# Patient Record
Sex: MALE | Race: WHITE | NOT HISPANIC OR LATINO | Employment: FULL TIME | URBAN - METROPOLITAN AREA
[De-identification: names, ages, dates, MRNs, and addresses within clinical notes are randomized per-mention and may not be internally consistent; named-entity substitution may affect disease eponyms.]

---

## 2018-07-18 ENCOUNTER — RECORDS - HEALTHEAST (OUTPATIENT)
Dept: LAB | Facility: HOSPITAL | Age: 48
End: 2018-07-18

## 2018-07-19 LAB — HBV SURFACE AB SERPL IA-ACNC: NEGATIVE M[IU]/ML

## 2018-07-20 LAB
GAMMA INTERFERON BACKGROUND BLD IA-ACNC: 0.14 IU/ML
M TB IFN-G BLD-IMP: NEGATIVE
MITOGEN IGNF BCKGRD COR BLD-ACNC: -0.02 IU/ML
MITOGEN IGNF BCKGRD COR BLD-ACNC: 0.04 IU/ML
QTF INTERPRETATION: NORMAL
QTF MITOGEN - NIL: >10 IU/ML

## 2018-09-21 ENCOUNTER — COMMUNICATION - HEALTHEAST (OUTPATIENT)
Dept: TELEHEALTH | Facility: CLINIC | Age: 48
End: 2018-09-21

## 2018-11-02 ENCOUNTER — OFFICE VISIT - HEALTHEAST (OUTPATIENT)
Dept: FAMILY MEDICINE | Facility: CLINIC | Age: 48
End: 2018-11-02

## 2018-11-02 DIAGNOSIS — Z00.00 ROUTINE HISTORY AND PHYSICAL EXAMINATION OF ADULT: ICD-10-CM

## 2018-11-02 DIAGNOSIS — Z12.11 SCREEN FOR COLON CANCER: ICD-10-CM

## 2018-11-02 DIAGNOSIS — R73.03 PREDIABETES: ICD-10-CM

## 2018-11-02 DIAGNOSIS — Z12.5 SCREENING FOR PROSTATE CANCER: ICD-10-CM

## 2018-11-02 DIAGNOSIS — E29.1 MALE HYPOGONADISM: ICD-10-CM

## 2018-11-02 DIAGNOSIS — E29.1 HYPOGONADISM MALE: ICD-10-CM

## 2018-11-02 DIAGNOSIS — F32.A DEPRESSION: ICD-10-CM

## 2018-11-02 LAB
ALBUMIN SERPL-MCNC: 4.1 G/DL (ref 3.5–5)
ALP SERPL-CCNC: 74 U/L (ref 45–120)
ALT SERPL W P-5'-P-CCNC: 33 U/L (ref 0–45)
ANION GAP SERPL CALCULATED.3IONS-SCNC: 12 MMOL/L (ref 5–18)
AST SERPL W P-5'-P-CCNC: 23 U/L (ref 0–40)
BASOPHILS # BLD AUTO: 0.1 THOU/UL (ref 0–0.2)
BASOPHILS NFR BLD AUTO: 1 % (ref 0–2)
BILIRUB SERPL-MCNC: 1.6 MG/DL (ref 0–1)
BUN SERPL-MCNC: 13 MG/DL (ref 8–22)
CALCIUM SERPL-MCNC: 9.4 MG/DL (ref 8.5–10.5)
CHLORIDE BLD-SCNC: 104 MMOL/L (ref 98–107)
CHOLEST SERPL-MCNC: 145 MG/DL
CO2 SERPL-SCNC: 24 MMOL/L (ref 22–31)
CREAT SERPL-MCNC: 1.11 MG/DL (ref 0.7–1.3)
EOSINOPHIL # BLD AUTO: 0.2 THOU/UL (ref 0–0.4)
EOSINOPHIL NFR BLD AUTO: 3 % (ref 0–6)
ERYTHROCYTE [DISTWIDTH] IN BLOOD BY AUTOMATED COUNT: 10.8 % (ref 11–14.5)
FASTING STATUS PATIENT QL REPORTED: YES
FSH SERPL-ACNC: <3 MIU/ML (ref 0–12)
GFR SERPL CREATININE-BSD FRML MDRD: >60 ML/MIN/1.73M2
GLUCOSE BLD-MCNC: 133 MG/DL (ref 70–125)
HCT VFR BLD AUTO: 53.4 % (ref 40–54)
HDLC SERPL-MCNC: 40 MG/DL
HGB BLD-MCNC: 18.9 G/DL (ref 14–18)
LDLC SERPL CALC-MCNC: 93 MG/DL
LH SERPL-ACNC: <0.5 MIU/ML
LYMPHOCYTES # BLD AUTO: 1.8 THOU/UL (ref 0.8–4.4)
LYMPHOCYTES NFR BLD AUTO: 27 % (ref 20–40)
MCH RBC QN AUTO: 33.2 PG (ref 27–34)
MCHC RBC AUTO-ENTMCNC: 35.3 G/DL (ref 32–36)
MCV RBC AUTO: 94 FL (ref 80–100)
MONOCYTES # BLD AUTO: 0.6 THOU/UL (ref 0–0.9)
MONOCYTES NFR BLD AUTO: 9 % (ref 2–10)
NEUTROPHILS # BLD AUTO: 4 THOU/UL (ref 2–7.7)
NEUTROPHILS NFR BLD AUTO: 60 % (ref 50–70)
PLATELET # BLD AUTO: 228 THOU/UL (ref 140–440)
PMV BLD AUTO: 7.6 FL (ref 7–10)
POTASSIUM BLD-SCNC: 4.1 MMOL/L (ref 3.5–5)
PROT SERPL-MCNC: 6.9 G/DL (ref 6–8)
PSA SERPL-MCNC: 0.3 NG/ML (ref 0–3.5)
RBC # BLD AUTO: 5.67 MILL/UL (ref 4.4–6.2)
SODIUM SERPL-SCNC: 140 MMOL/L (ref 136–145)
TRIGL SERPL-MCNC: 59 MG/DL
TSH SERPL DL<=0.005 MIU/L-ACNC: 0.73 UIU/ML (ref 0.3–5)
WBC: 6.6 THOU/UL (ref 4–11)

## 2018-11-02 ASSESSMENT — MIFFLIN-ST. JEOR: SCORE: 2278.94

## 2018-11-07 LAB
SHBG SERPL-SCNC: 31 NMOL/L (ref 11–80)
TESTOST FREE SERPL-MCNC: 28.95 NG/DL (ref 4.7–24.4)
TESTOST SERPL-MCNC: 1127 NG/DL (ref 240–950)

## 2018-11-09 ENCOUNTER — COMMUNICATION - HEALTHEAST (OUTPATIENT)
Dept: INTERNAL MEDICINE | Facility: CLINIC | Age: 48
End: 2018-11-09

## 2018-11-28 ENCOUNTER — AMBULATORY - HEALTHEAST (OUTPATIENT)
Dept: FAMILY MEDICINE | Facility: CLINIC | Age: 48
End: 2018-11-28

## 2018-11-28 DIAGNOSIS — E29.1 HYPOGONADISM MALE: ICD-10-CM

## 2019-04-10 ENCOUNTER — COMMUNICATION - HEALTHEAST (OUTPATIENT)
Dept: SCHEDULING | Facility: CLINIC | Age: 49
End: 2019-04-10

## 2019-04-11 ENCOUNTER — OFFICE VISIT - HEALTHEAST (OUTPATIENT)
Dept: FAMILY MEDICINE | Facility: CLINIC | Age: 49
End: 2019-04-11

## 2019-04-11 DIAGNOSIS — R10.10 UPPER ABDOMINAL PAIN, UNSPECIFIED: ICD-10-CM

## 2019-04-11 DIAGNOSIS — R10.10 UPPER ABDOMINAL PAIN: ICD-10-CM

## 2019-04-11 LAB
ALBUMIN SERPL-MCNC: 3.9 G/DL (ref 3.5–5)
ALP SERPL-CCNC: 89 U/L (ref 45–120)
ALT SERPL W P-5'-P-CCNC: 23 U/L (ref 0–45)
ANION GAP SERPL CALCULATED.3IONS-SCNC: 9 MMOL/L (ref 5–18)
AST SERPL W P-5'-P-CCNC: 17 U/L (ref 0–40)
ATRIAL RATE - MUSE: 72 BPM
BASOPHILS # BLD AUTO: 0.1 THOU/UL (ref 0–0.2)
BASOPHILS NFR BLD AUTO: 1 % (ref 0–2)
BILIRUB SERPL-MCNC: 1.5 MG/DL (ref 0–1)
BUN SERPL-MCNC: 20 MG/DL (ref 8–22)
CALCIUM SERPL-MCNC: 9.6 MG/DL (ref 8.5–10.5)
CHLORIDE BLD-SCNC: 102 MMOL/L (ref 98–107)
CK-MB: 2 NG/ML (ref 0–7)
CO2 SERPL-SCNC: 24 MMOL/L (ref 22–31)
CREAT SERPL-MCNC: 1.08 MG/DL (ref 0.7–1.3)
DIASTOLIC BLOOD PRESSURE - MUSE: NORMAL MMHG
EOSINOPHIL # BLD AUTO: 0.2 THOU/UL (ref 0–0.4)
EOSINOPHIL NFR BLD AUTO: 3 % (ref 0–6)
ERYTHROCYTE [DISTWIDTH] IN BLOOD BY AUTOMATED COUNT: 11.1 % (ref 11–14.5)
GFR SERPL CREATININE-BSD FRML MDRD: >60 ML/MIN/1.73M2
GLUCOSE BLD-MCNC: 309 MG/DL (ref 70–125)
HCT VFR BLD AUTO: 53.6 % (ref 40–54)
HGB BLD-MCNC: 18.3 G/DL (ref 14–18)
INTERPRETATION ECG - MUSE: NORMAL
LIPASE SERPL-CCNC: 13 U/L (ref 0–52)
LYMPHOCYTES # BLD AUTO: 2.5 THOU/UL (ref 0.8–4.4)
LYMPHOCYTES NFR BLD AUTO: 38 % (ref 20–40)
MCH RBC QN AUTO: 32.1 PG (ref 27–34)
MCHC RBC AUTO-ENTMCNC: 34.2 G/DL (ref 32–36)
MCV RBC AUTO: 94 FL (ref 80–100)
MONOCYTES # BLD AUTO: 0.4 THOU/UL (ref 0–0.9)
MONOCYTES NFR BLD AUTO: 7 % (ref 2–10)
NEUTROPHILS # BLD AUTO: 3.5 THOU/UL (ref 2–7.7)
NEUTROPHILS NFR BLD AUTO: 52 % (ref 50–70)
P AXIS - MUSE: 60 DEGREES
PLATELET # BLD AUTO: 224 THOU/UL (ref 140–440)
PMV BLD AUTO: 7.6 FL (ref 7–10)
POTASSIUM BLD-SCNC: 4 MMOL/L (ref 3.5–5)
PR INTERVAL - MUSE: 178 MS
PROT SERPL-MCNC: 7 G/DL (ref 6–8)
QRS DURATION - MUSE: 78 MS
QT - MUSE: 378 MS
QTC - MUSE: 413 MS
R AXIS - MUSE: 23 DEGREES
RBC # BLD AUTO: 5.71 MILL/UL (ref 4.4–6.2)
SODIUM SERPL-SCNC: 135 MMOL/L (ref 136–145)
SYSTOLIC BLOOD PRESSURE - MUSE: NORMAL MMHG
T AXIS - MUSE: 39 DEGREES
TROPONIN I SERPL-MCNC: <0.01 NG/ML (ref 0–0.29)
VENTRICULAR RATE- MUSE: 72 BPM
WBC: 6.7 THOU/UL (ref 4–11)

## 2019-04-12 ENCOUNTER — COMMUNICATION - HEALTHEAST (OUTPATIENT)
Dept: INTERNAL MEDICINE | Facility: CLINIC | Age: 49
End: 2019-04-12

## 2019-04-17 ENCOUNTER — OFFICE VISIT - HEALTHEAST (OUTPATIENT)
Dept: SURGERY | Facility: CLINIC | Age: 49
End: 2019-04-17

## 2019-04-17 ENCOUNTER — OFFICE VISIT - HEALTHEAST (OUTPATIENT)
Dept: FAMILY MEDICINE | Facility: CLINIC | Age: 49
End: 2019-04-17

## 2019-04-17 DIAGNOSIS — R73.9 BLOOD GLUCOSE ELEVATED: ICD-10-CM

## 2019-04-17 DIAGNOSIS — E29.1 HYPOGONADISM MALE: ICD-10-CM

## 2019-04-17 DIAGNOSIS — R73.03 PREDIABETES: ICD-10-CM

## 2019-04-17 DIAGNOSIS — R10.11 RUQ ABDOMINAL PAIN: ICD-10-CM

## 2019-04-17 LAB
CHOLEST SERPL-MCNC: 182 MG/DL
CREAT UR-MCNC: 87.8 MG/DL
FASTING STATUS PATIENT QL REPORTED: YES
HBA1C MFR BLD: 7 % (ref 3.5–6)
HDLC SERPL-MCNC: 69 MG/DL
LDLC SERPL CALC-MCNC: 95 MG/DL
MICROALBUMIN UR-MCNC: 1.01 MG/DL (ref 0–1.99)
MICROALBUMIN/CREAT UR: 11.5 MG/G
TRIGL SERPL-MCNC: 91 MG/DL

## 2019-04-19 ENCOUNTER — RECORDS - HEALTHEAST (OUTPATIENT)
Dept: NUCLEAR MEDICINE | Facility: HOSPITAL | Age: 49
End: 2019-04-19

## 2019-04-19 ENCOUNTER — COMMUNICATION - HEALTHEAST (OUTPATIENT)
Dept: SCHEDULING | Facility: CLINIC | Age: 49
End: 2019-04-19

## 2019-04-19 DIAGNOSIS — R10.11 RUQ ABDOMINAL PAIN: ICD-10-CM

## 2019-04-19 DIAGNOSIS — R10.11 RIGHT UPPER QUADRANT PAIN: ICD-10-CM

## 2019-04-19 LAB — TESTOST SERPL-MCNC: 275 NG/DL (ref 221–716)

## 2019-04-20 ENCOUNTER — COMMUNICATION - HEALTHEAST (OUTPATIENT)
Dept: FAMILY MEDICINE | Facility: CLINIC | Age: 49
End: 2019-04-20

## 2019-04-20 DIAGNOSIS — F32.A DEPRESSION: ICD-10-CM

## 2019-04-23 ENCOUNTER — COMMUNICATION - HEALTHEAST (OUTPATIENT)
Dept: FAMILY MEDICINE | Facility: CLINIC | Age: 49
End: 2019-04-23

## 2019-04-25 ENCOUNTER — COMMUNICATION - HEALTHEAST (OUTPATIENT)
Dept: SURGERY | Facility: CLINIC | Age: 49
End: 2019-04-25

## 2019-04-25 ENCOUNTER — OFFICE VISIT - HEALTHEAST (OUTPATIENT)
Dept: FAMILY MEDICINE | Facility: CLINIC | Age: 49
End: 2019-04-25

## 2019-04-25 DIAGNOSIS — R73.03 PREDIABETES: ICD-10-CM

## 2019-04-25 DIAGNOSIS — R10.10 UPPER ABDOMINAL PAIN: ICD-10-CM

## 2019-04-25 DIAGNOSIS — R73.9 BLOOD GLUCOSE ELEVATED: ICD-10-CM

## 2019-04-25 DIAGNOSIS — F32.A DEPRESSION: ICD-10-CM

## 2019-05-15 ENCOUNTER — COMMUNICATION - HEALTHEAST (OUTPATIENT)
Dept: PEDIATRICS | Facility: CLINIC | Age: 49
End: 2019-05-15

## 2019-05-29 ENCOUNTER — OFFICE VISIT - HEALTHEAST (OUTPATIENT)
Dept: FAMILY MEDICINE | Facility: CLINIC | Age: 49
End: 2019-05-29

## 2019-05-29 DIAGNOSIS — F32.A DEPRESSION: ICD-10-CM

## 2019-05-29 DIAGNOSIS — E11.9 TYPE 2 DIABETES MELLITUS WITHOUT COMPLICATION, WITHOUT LONG-TERM CURRENT USE OF INSULIN (H): ICD-10-CM

## 2019-05-31 ENCOUNTER — COMMUNICATION - HEALTHEAST (OUTPATIENT)
Dept: FAMILY MEDICINE | Facility: CLINIC | Age: 49
End: 2019-05-31

## 2019-08-19 ENCOUNTER — COMMUNICATION - HEALTHEAST (OUTPATIENT)
Dept: PHARMACY | Facility: HOSPITAL | Age: 49
End: 2019-08-19

## 2019-08-19 DIAGNOSIS — F32.A DEPRESSION: ICD-10-CM

## 2019-10-21 ENCOUNTER — COMMUNICATION - HEALTHEAST (OUTPATIENT)
Dept: FAMILY MEDICINE | Facility: CLINIC | Age: 49
End: 2019-10-21

## 2019-10-21 DIAGNOSIS — E29.1 HYPOGONADISM MALE: ICD-10-CM

## 2019-10-21 DIAGNOSIS — F32.A DEPRESSION: ICD-10-CM

## 2019-10-22 ENCOUNTER — COMMUNICATION - HEALTHEAST (OUTPATIENT)
Dept: FAMILY MEDICINE | Facility: CLINIC | Age: 49
End: 2019-10-22

## 2019-10-29 ENCOUNTER — COMMUNICATION - HEALTHEAST (OUTPATIENT)
Dept: PHARMACY | Facility: HOSPITAL | Age: 49
End: 2019-10-29

## 2019-11-11 ENCOUNTER — COMMUNICATION - HEALTHEAST (OUTPATIENT)
Dept: FAMILY MEDICINE | Facility: CLINIC | Age: 49
End: 2019-11-11

## 2019-11-14 ENCOUNTER — OFFICE VISIT - HEALTHEAST (OUTPATIENT)
Dept: FAMILY MEDICINE | Facility: CLINIC | Age: 49
End: 2019-11-14

## 2019-11-14 DIAGNOSIS — Z12.11 SCREEN FOR COLON CANCER: ICD-10-CM

## 2019-11-14 DIAGNOSIS — E11.9 TYPE 2 DIABETES MELLITUS WITHOUT COMPLICATION, WITHOUT LONG-TERM CURRENT USE OF INSULIN (H): ICD-10-CM

## 2019-11-14 DIAGNOSIS — Z13.6 ENCOUNTER FOR SCREENING FOR CARDIOVASCULAR DISORDERS: ICD-10-CM

## 2019-11-14 DIAGNOSIS — Z00.00 ROUTINE GENERAL MEDICAL EXAMINATION AT A HEALTH CARE FACILITY: ICD-10-CM

## 2019-11-14 DIAGNOSIS — E29.1 HYPOGONADISM MALE: ICD-10-CM

## 2019-11-14 DIAGNOSIS — F32.A DEPRESSION: ICD-10-CM

## 2019-11-14 DIAGNOSIS — R07.9 LEFT-SIDED CHEST PAIN: ICD-10-CM

## 2019-11-14 LAB
ALBUMIN SERPL-MCNC: 4.3 G/DL (ref 3.5–5)
ALP SERPL-CCNC: 119 U/L (ref 45–120)
ALT SERPL W P-5'-P-CCNC: 32 U/L (ref 0–45)
ANION GAP SERPL CALCULATED.3IONS-SCNC: 12 MMOL/L (ref 5–18)
AST SERPL W P-5'-P-CCNC: 21 U/L (ref 0–40)
BASOPHILS # BLD AUTO: 0.1 THOU/UL (ref 0–0.2)
BASOPHILS NFR BLD AUTO: 1 % (ref 0–2)
BILIRUB SERPL-MCNC: 0.9 MG/DL (ref 0–1)
BUN SERPL-MCNC: 16 MG/DL (ref 8–22)
CALCIUM SERPL-MCNC: 9.6 MG/DL (ref 8.5–10.5)
CHLORIDE BLD-SCNC: 100 MMOL/L (ref 98–107)
CHOLEST SERPL-MCNC: 256 MG/DL
CO2 SERPL-SCNC: 24 MMOL/L (ref 22–31)
CREAT SERPL-MCNC: 1.09 MG/DL (ref 0.7–1.3)
CREAT UR-MCNC: 200.7 MG/DL
EOSINOPHIL # BLD AUTO: 0.3 THOU/UL (ref 0–0.4)
EOSINOPHIL NFR BLD AUTO: 3 % (ref 0–6)
ERYTHROCYTE [DISTWIDTH] IN BLOOD BY AUTOMATED COUNT: 10.8 % (ref 11–14.5)
FASTING STATUS PATIENT QL REPORTED: YES
GFR SERPL CREATININE-BSD FRML MDRD: >60 ML/MIN/1.73M2
GLUCOSE BLD-MCNC: 251 MG/DL (ref 70–125)
HBA1C MFR BLD: 8.6 % (ref 3.5–6)
HCT VFR BLD AUTO: 48.8 % (ref 40–54)
HDLC SERPL-MCNC: 54 MG/DL
HGB BLD-MCNC: 16.5 G/DL (ref 14–18)
LDLC SERPL CALC-MCNC: 168 MG/DL
LYMPHOCYTES # BLD AUTO: 3.1 THOU/UL (ref 0.8–4.4)
LYMPHOCYTES NFR BLD AUTO: 34 % (ref 20–40)
MCH RBC QN AUTO: 31.4 PG (ref 27–34)
MCHC RBC AUTO-ENTMCNC: 33.8 G/DL (ref 32–36)
MCV RBC AUTO: 93 FL (ref 80–100)
MICROALBUMIN UR-MCNC: 2.38 MG/DL (ref 0–1.99)
MICROALBUMIN/CREAT UR: 11.9 MG/G
MONOCYTES # BLD AUTO: 0.9 THOU/UL (ref 0–0.9)
MONOCYTES NFR BLD AUTO: 10 % (ref 2–10)
NEUTROPHILS # BLD AUTO: 4.7 THOU/UL (ref 2–7.7)
NEUTROPHILS NFR BLD AUTO: 52 % (ref 50–70)
PLATELET # BLD AUTO: 248 THOU/UL (ref 140–440)
PMV BLD AUTO: 7.4 FL (ref 7–10)
POTASSIUM BLD-SCNC: 4.1 MMOL/L (ref 3.5–5)
PROT SERPL-MCNC: 7.6 G/DL (ref 6–8)
PSA SERPL-MCNC: 0.3 NG/ML (ref 0–3.5)
RBC # BLD AUTO: 5.24 MILL/UL (ref 4.4–6.2)
SODIUM SERPL-SCNC: 136 MMOL/L (ref 136–145)
TRIGL SERPL-MCNC: 170 MG/DL
WBC: 9 THOU/UL (ref 4–11)

## 2019-11-14 ASSESSMENT — PATIENT HEALTH QUESTIONNAIRE - PHQ9: SUM OF ALL RESPONSES TO PHQ QUESTIONS 1-9: 9

## 2019-11-14 ASSESSMENT — MIFFLIN-ST. JEOR: SCORE: 2376.46

## 2019-11-15 LAB — TESTOST SERPL-MCNC: 506 NG/DL (ref 221–716)

## 2019-11-18 ENCOUNTER — COMMUNICATION - HEALTHEAST (OUTPATIENT)
Dept: PHARMACY | Facility: HOSPITAL | Age: 49
End: 2019-11-18

## 2019-11-18 DIAGNOSIS — E11.9 TYPE 2 DIABETES MELLITUS WITHOUT COMPLICATION, WITHOUT LONG-TERM CURRENT USE OF INSULIN (H): ICD-10-CM

## 2019-11-20 ENCOUNTER — COMMUNICATION - HEALTHEAST (OUTPATIENT)
Dept: INTERNAL MEDICINE | Facility: CLINIC | Age: 49
End: 2019-11-20

## 2019-12-19 ENCOUNTER — COMMUNICATION - HEALTHEAST (OUTPATIENT)
Dept: PHARMACY | Facility: HOSPITAL | Age: 49
End: 2019-12-19

## 2019-12-19 DIAGNOSIS — F32.A DEPRESSION: ICD-10-CM

## 2020-02-20 ENCOUNTER — OFFICE VISIT - HEALTHEAST (OUTPATIENT)
Dept: FAMILY MEDICINE | Facility: CLINIC | Age: 50
End: 2020-02-20

## 2020-02-20 DIAGNOSIS — E11.9 TYPE 2 DIABETES MELLITUS WITHOUT COMPLICATION, WITHOUT LONG-TERM CURRENT USE OF INSULIN (H): ICD-10-CM

## 2020-02-28 ENCOUNTER — RECORDS - HEALTHEAST (OUTPATIENT)
Dept: ADMINISTRATIVE | Facility: OTHER | Age: 50
End: 2020-02-28

## 2020-02-28 LAB — RETINOPATHY: NEGATIVE

## 2020-03-06 ENCOUNTER — RECORDS - HEALTHEAST (OUTPATIENT)
Dept: HEALTH INFORMATION MANAGEMENT | Facility: CLINIC | Age: 50
End: 2020-03-06

## 2020-03-18 ENCOUNTER — COMMUNICATION - HEALTHEAST (OUTPATIENT)
Dept: FAMILY MEDICINE | Facility: CLINIC | Age: 50
End: 2020-03-18

## 2020-05-21 ENCOUNTER — COMMUNICATION - HEALTHEAST (OUTPATIENT)
Dept: FAMILY MEDICINE | Facility: CLINIC | Age: 50
End: 2020-05-21

## 2020-05-21 DIAGNOSIS — Z91.09 ENVIRONMENTAL ALLERGIES: ICD-10-CM

## 2020-05-27 ENCOUNTER — COMMUNICATION - HEALTHEAST (OUTPATIENT)
Dept: PHARMACY | Facility: HOSPITAL | Age: 50
End: 2020-05-27

## 2020-05-27 DIAGNOSIS — F32.A DEPRESSION: ICD-10-CM

## 2020-05-27 DIAGNOSIS — E29.1 HYPOGONADISM MALE: ICD-10-CM

## 2020-06-01 ENCOUNTER — COMMUNICATION - HEALTHEAST (OUTPATIENT)
Dept: FAMILY MEDICINE | Facility: CLINIC | Age: 50
End: 2020-06-01

## 2020-06-01 ENCOUNTER — AMBULATORY - HEALTHEAST (OUTPATIENT)
Dept: LAB | Facility: CLINIC | Age: 50
End: 2020-06-01

## 2020-06-01 DIAGNOSIS — E29.1 HYPOGONADISM MALE: ICD-10-CM

## 2020-06-01 DIAGNOSIS — E11.9 TYPE 2 DIABETES MELLITUS WITHOUT COMPLICATION, WITHOUT LONG-TERM CURRENT USE OF INSULIN (H): ICD-10-CM

## 2020-06-01 LAB
BASOPHILS # BLD AUTO: 0.1 THOU/UL (ref 0–0.2)
BASOPHILS NFR BLD AUTO: 1 % (ref 0–2)
EOSINOPHIL # BLD AUTO: 0.2 THOU/UL (ref 0–0.4)
EOSINOPHIL NFR BLD AUTO: 2 % (ref 0–6)
ERYTHROCYTE [DISTWIDTH] IN BLOOD BY AUTOMATED COUNT: 10.8 % (ref 11–14.5)
HBA1C MFR BLD: 9.5 % (ref 3.5–6)
HCT VFR BLD AUTO: 48.6 % (ref 40–54)
HGB BLD-MCNC: 16.7 G/DL (ref 14–18)
LYMPHOCYTES # BLD AUTO: 3 THOU/UL (ref 0.8–4.4)
LYMPHOCYTES NFR BLD AUTO: 31 % (ref 20–40)
MCH RBC QN AUTO: 32.2 PG (ref 27–34)
MCHC RBC AUTO-ENTMCNC: 34.4 G/DL (ref 32–36)
MCV RBC AUTO: 94 FL (ref 80–100)
MONOCYTES # BLD AUTO: 0.8 THOU/UL (ref 0–0.9)
MONOCYTES NFR BLD AUTO: 9 % (ref 2–10)
NEUTROPHILS # BLD AUTO: 5.7 THOU/UL (ref 2–7.7)
NEUTROPHILS NFR BLD AUTO: 58 % (ref 50–70)
PLATELET # BLD AUTO: 279 THOU/UL (ref 140–440)
PMV BLD AUTO: 8.1 FL (ref 7–10)
PSA SERPL-MCNC: 0.2 NG/ML (ref 0–3.5)
RBC # BLD AUTO: 5.19 MILL/UL (ref 4.4–6.2)
TESTOST SERPL-MCNC: 140 NG/DL (ref 221–716)
WBC: 9.8 THOU/UL (ref 4–11)

## 2020-06-05 ENCOUNTER — OFFICE VISIT - HEALTHEAST (OUTPATIENT)
Dept: FAMILY MEDICINE | Facility: CLINIC | Age: 50
End: 2020-06-05

## 2020-06-05 DIAGNOSIS — Z91.199 NO-SHOW FOR APPOINTMENT: ICD-10-CM

## 2020-06-05 DIAGNOSIS — Z12.11 SCREEN FOR COLON CANCER: ICD-10-CM

## 2020-06-05 DIAGNOSIS — E11.9 TYPE 2 DIABETES MELLITUS WITHOUT COMPLICATION, WITHOUT LONG-TERM CURRENT USE OF INSULIN (H): ICD-10-CM

## 2020-06-05 DIAGNOSIS — E78.5 HYPERLIPIDEMIA, UNSPECIFIED HYPERLIPIDEMIA TYPE: ICD-10-CM

## 2020-06-05 ASSESSMENT — PATIENT HEALTH QUESTIONNAIRE - PHQ9: SUM OF ALL RESPONSES TO PHQ QUESTIONS 1-9: 3

## 2020-06-12 ENCOUNTER — OFFICE VISIT - HEALTHEAST (OUTPATIENT)
Dept: FAMILY MEDICINE | Facility: CLINIC | Age: 50
End: 2020-06-12

## 2020-06-12 ENCOUNTER — COMMUNICATION - HEALTHEAST (OUTPATIENT)
Dept: FAMILY MEDICINE | Facility: CLINIC | Age: 50
End: 2020-06-12

## 2020-06-12 DIAGNOSIS — E11.9 TYPE 2 DIABETES MELLITUS WITHOUT COMPLICATION, WITHOUT LONG-TERM CURRENT USE OF INSULIN (H): ICD-10-CM

## 2020-06-12 DIAGNOSIS — E29.1 HYPOGONADISM MALE: ICD-10-CM

## 2020-06-12 ASSESSMENT — MIFFLIN-ST. JEOR: SCORE: 2344.71

## 2020-07-27 ENCOUNTER — COMMUNICATION - HEALTHEAST (OUTPATIENT)
Dept: FAMILY MEDICINE | Facility: CLINIC | Age: 50
End: 2020-07-27

## 2020-07-27 DIAGNOSIS — E29.1 HYPOGONADISM MALE: ICD-10-CM

## 2020-07-27 DIAGNOSIS — E11.9 TYPE 2 DIABETES MELLITUS WITHOUT COMPLICATION, WITHOUT LONG-TERM CURRENT USE OF INSULIN (H): ICD-10-CM

## 2020-07-31 ENCOUNTER — AMBULATORY - HEALTHEAST (OUTPATIENT)
Dept: LAB | Facility: CLINIC | Age: 50
End: 2020-07-31

## 2020-07-31 DIAGNOSIS — E29.1 HYPOGONADISM MALE: ICD-10-CM

## 2020-08-05 LAB
SHBG SERPL-SCNC: 36 NMOL/L (ref 11–80)
TESTOST FREE SERPL-MCNC: 6.95 NG/DL (ref 4.7–24.4)
TESTOST SERPL-MCNC: 365 NG/DL (ref 240–950)

## 2020-08-06 ENCOUNTER — COMMUNICATION - HEALTHEAST (OUTPATIENT)
Dept: FAMILY MEDICINE | Facility: CLINIC | Age: 50
End: 2020-08-06

## 2020-08-06 DIAGNOSIS — E29.1 HYPOGONADISM MALE: ICD-10-CM

## 2020-08-13 ENCOUNTER — OFFICE VISIT - HEALTHEAST (OUTPATIENT)
Dept: FAMILY MEDICINE | Facility: CLINIC | Age: 50
End: 2020-08-13

## 2020-08-13 DIAGNOSIS — U07.1 2019 NOVEL CORONAVIRUS DISEASE (COVID-19): ICD-10-CM

## 2020-08-17 ENCOUNTER — OFFICE VISIT - HEALTHEAST (OUTPATIENT)
Dept: FAMILY MEDICINE | Facility: CLINIC | Age: 50
End: 2020-08-17

## 2020-08-21 ENCOUNTER — OFFICE VISIT - HEALTHEAST (OUTPATIENT)
Dept: FAMILY MEDICINE | Facility: CLINIC | Age: 50
End: 2020-08-21

## 2020-08-21 DIAGNOSIS — R06.02 SHORTNESS OF BREATH: ICD-10-CM

## 2020-08-21 DIAGNOSIS — R06.02 SOB (SHORTNESS OF BREATH): ICD-10-CM

## 2020-08-21 LAB
BASOPHILS # BLD AUTO: 0.1 THOU/UL (ref 0–0.2)
BASOPHILS NFR BLD AUTO: 1 % (ref 0–2)
BNP SERPL-MCNC: 27 PG/ML (ref 0–42)
EOSINOPHIL # BLD AUTO: 0.2 THOU/UL (ref 0–0.4)
EOSINOPHIL NFR BLD AUTO: 2 % (ref 0–6)
ERYTHROCYTE [DISTWIDTH] IN BLOOD BY AUTOMATED COUNT: 10.9 % (ref 11–14.5)
HCT VFR BLD AUTO: 47.8 % (ref 40–54)
HGB BLD-MCNC: 16.4 G/DL (ref 14–18)
LYMPHOCYTES # BLD AUTO: 1.9 THOU/UL (ref 0.8–4.4)
LYMPHOCYTES NFR BLD AUTO: 26 % (ref 20–40)
MCH RBC QN AUTO: 31.9 PG (ref 27–34)
MCHC RBC AUTO-ENTMCNC: 34.4 G/DL (ref 32–36)
MCV RBC AUTO: 93 FL (ref 80–100)
MONOCYTES # BLD AUTO: 0.6 THOU/UL (ref 0–0.9)
MONOCYTES NFR BLD AUTO: 9 % (ref 2–10)
NEUTROPHILS # BLD AUTO: 4.5 THOU/UL (ref 2–7.7)
NEUTROPHILS NFR BLD AUTO: 62 % (ref 50–70)
PLATELET # BLD AUTO: 248 THOU/UL (ref 140–440)
PMV BLD AUTO: 7.6 FL (ref 7–10)
RBC # BLD AUTO: 5.15 MILL/UL (ref 4.4–6.2)
WBC: 7.2 THOU/UL (ref 4–11)

## 2020-08-21 ASSESSMENT — ANXIETY QUESTIONNAIRES
6. BECOMING EASILY ANNOYED OR IRRITABLE: SEVERAL DAYS
7. FEELING AFRAID AS IF SOMETHING AWFUL MIGHT HAPPEN: NOT AT ALL
IF YOU CHECKED OFF ANY PROBLEMS ON THIS QUESTIONNAIRE, HOW DIFFICULT HAVE THESE PROBLEMS MADE IT FOR YOU TO DO YOUR WORK, TAKE CARE OF THINGS AT HOME, OR GET ALONG WITH OTHER PEOPLE: SOMEWHAT DIFFICULT
3. WORRYING TOO MUCH ABOUT DIFFERENT THINGS: SEVERAL DAYS
2. NOT BEING ABLE TO STOP OR CONTROL WORRYING: NOT AT ALL
1. FEELING NERVOUS, ANXIOUS, OR ON EDGE: NOT AT ALL
GAD7 TOTAL SCORE: 4
4. TROUBLE RELAXING: MORE THAN HALF THE DAYS
5. BEING SO RESTLESS THAT IT IS HARD TO SIT STILL: NOT AT ALL

## 2020-08-21 ASSESSMENT — MIFFLIN-ST. JEOR: SCORE: 2295.27

## 2020-08-21 ASSESSMENT — PATIENT HEALTH QUESTIONNAIRE - PHQ9: SUM OF ALL RESPONSES TO PHQ QUESTIONS 1-9: 11

## 2020-08-24 ENCOUNTER — COMMUNICATION - HEALTHEAST (OUTPATIENT)
Dept: FAMILY MEDICINE | Facility: CLINIC | Age: 50
End: 2020-08-24

## 2020-09-29 ENCOUNTER — COMMUNICATION - HEALTHEAST (OUTPATIENT)
Dept: FAMILY MEDICINE | Facility: CLINIC | Age: 50
End: 2020-09-29

## 2020-09-29 DIAGNOSIS — E29.1 HYPOGONADISM MALE: ICD-10-CM

## 2020-10-05 ENCOUNTER — COMMUNICATION - HEALTHEAST (OUTPATIENT)
Dept: PHARMACY | Facility: HOSPITAL | Age: 50
End: 2020-10-05

## 2020-10-05 DIAGNOSIS — E29.1 HYPOGONADISM MALE: ICD-10-CM

## 2020-10-28 ENCOUNTER — COMMUNICATION - HEALTHEAST (OUTPATIENT)
Dept: FAMILY MEDICINE | Facility: CLINIC | Age: 50
End: 2020-10-28

## 2020-10-28 DIAGNOSIS — F32.A DEPRESSION: ICD-10-CM

## 2020-10-28 DIAGNOSIS — E11.9 TYPE 2 DIABETES MELLITUS WITHOUT COMPLICATION, WITHOUT LONG-TERM CURRENT USE OF INSULIN (H): ICD-10-CM

## 2020-11-05 ENCOUNTER — OFFICE VISIT - HEALTHEAST (OUTPATIENT)
Dept: FAMILY MEDICINE | Facility: CLINIC | Age: 50
End: 2020-11-05

## 2020-11-05 DIAGNOSIS — F33.9 RECURRENT MAJOR DEPRESSIVE DISORDER, REMISSION STATUS UNSPECIFIED (H): ICD-10-CM

## 2020-11-05 DIAGNOSIS — R06.02 SHORTNESS OF BREATH: ICD-10-CM

## 2020-11-05 DIAGNOSIS — Z12.11 SCREEN FOR COLON CANCER: ICD-10-CM

## 2020-11-05 DIAGNOSIS — E29.1 HYPOGONADISM MALE: ICD-10-CM

## 2020-11-05 DIAGNOSIS — E66.01 MORBID OBESITY (H): ICD-10-CM

## 2020-11-05 DIAGNOSIS — E11.9 TYPE 2 DIABETES MELLITUS WITHOUT COMPLICATION, WITHOUT LONG-TERM CURRENT USE OF INSULIN (H): ICD-10-CM

## 2020-11-05 LAB
ALBUMIN SERPL-MCNC: 4.3 G/DL (ref 3.5–5)
ALP SERPL-CCNC: 110 U/L (ref 45–120)
ALT SERPL W P-5'-P-CCNC: 26 U/L (ref 0–45)
ANION GAP SERPL CALCULATED.3IONS-SCNC: 11 MMOL/L (ref 5–18)
AST SERPL W P-5'-P-CCNC: 15 U/L (ref 0–40)
BILIRUB SERPL-MCNC: 1.1 MG/DL (ref 0–1)
BUN SERPL-MCNC: 25 MG/DL (ref 8–22)
CALCIUM SERPL-MCNC: 9 MG/DL (ref 8.5–10.5)
CHLORIDE BLD-SCNC: 100 MMOL/L (ref 98–107)
CO2 SERPL-SCNC: 24 MMOL/L (ref 22–31)
CREAT SERPL-MCNC: 1.23 MG/DL (ref 0.7–1.3)
GFR SERPL CREATININE-BSD FRML MDRD: >60 ML/MIN/1.73M2
GLUCOSE BLD-MCNC: 319 MG/DL (ref 70–125)
HBA1C MFR BLD: 9 %
POTASSIUM BLD-SCNC: 4.4 MMOL/L (ref 3.5–5)
PROT SERPL-MCNC: 7.1 G/DL (ref 6–8)
SODIUM SERPL-SCNC: 135 MMOL/L (ref 136–145)

## 2020-11-05 ASSESSMENT — MIFFLIN-ST. JEOR: SCORE: 2361.72

## 2020-11-06 ENCOUNTER — AMBULATORY - HEALTHEAST (OUTPATIENT)
Dept: FAMILY MEDICINE | Facility: CLINIC | Age: 50
End: 2020-11-06

## 2020-11-06 DIAGNOSIS — E29.1 HYPOGONADISM MALE: ICD-10-CM

## 2020-11-06 LAB — TESTOST SERPL-MCNC: 130 NG/DL (ref 221–716)

## 2020-11-16 ENCOUNTER — COMMUNICATION - HEALTHEAST (OUTPATIENT)
Dept: FAMILY MEDICINE | Facility: CLINIC | Age: 50
End: 2020-11-16

## 2020-11-19 ENCOUNTER — HOSPITAL ENCOUNTER (OUTPATIENT)
Dept: NUCLEAR MEDICINE | Facility: HOSPITAL | Age: 50
Discharge: HOME | End: 2020-11-19
Attending: FAMILY MEDICINE

## 2020-11-19 ENCOUNTER — RECORDS - HEALTHEAST (OUTPATIENT)
Dept: CARDIOLOGY | Facility: HOSPITAL | Age: 50
End: 2020-11-19

## 2020-11-19 DIAGNOSIS — R06.02 SHORTNESS OF BREATH: ICD-10-CM

## 2020-11-19 LAB
CV STRESS CURRENT BP HE: NORMAL
CV STRESS CURRENT HR HE: 102
CV STRESS CURRENT HR HE: 102
CV STRESS CURRENT HR HE: 103
CV STRESS CURRENT HR HE: 117
CV STRESS CURRENT HR HE: 120
CV STRESS CURRENT HR HE: 125
CV STRESS CURRENT HR HE: 126
CV STRESS CURRENT HR HE: 130
CV STRESS CURRENT HR HE: 138
CV STRESS CURRENT HR HE: 139
CV STRESS CURRENT HR HE: 142
CV STRESS CURRENT HR HE: 142
CV STRESS CURRENT HR HE: 144
CV STRESS CURRENT HR HE: 144
CV STRESS CURRENT HR HE: 145
CV STRESS CURRENT HR HE: 148
CV STRESS CURRENT HR HE: 149
CV STRESS CURRENT HR HE: 162
CV STRESS CURRENT HR HE: 165
CV STRESS CURRENT HR HE: 165
CV STRESS CURRENT HR HE: 92
CV STRESS CURRENT HR HE: 96
CV STRESS CURRENT HR HE: 97
CV STRESS CURRENT HR HE: 97
CV STRESS CURRENT HR HE: 98
CV STRESS CURRENT HR HE: 98
CV STRESS CURRENT HR HE: 99
CV STRESS CURRENT HR HE: 99
CV STRESS DEVIATION TIME HE: NORMAL
CV STRESS ECHO PERCENT HR HE: NORMAL
CV STRESS EXERCISE STAGE HE: NORMAL
CV STRESS EXERCISE STAGE REACHED HE: NORMAL
CV STRESS FINAL RESTING BP HE: NORMAL
CV STRESS FINAL RESTING HR HE: 99
CV STRESS MAX HR HE: 167
CV STRESS MAX TREADMILL GRADE HE: 14
CV STRESS MAX TREADMILL SPEED HE: 3.4
CV STRESS PEAK DIA BP HE: NORMAL
CV STRESS PEAK SYS BP HE: NORMAL
CV STRESS PHASE HE: NORMAL
CV STRESS PROTOCOL HE: NORMAL
CV STRESS RESTING PT POSITION HE: NORMAL
CV STRESS RESTING PT POSITION HE: NORMAL
CV STRESS ST DEVIATION AMOUNT HE: NORMAL
CV STRESS ST DEVIATION ELEVATION HE: NORMAL
CV STRESS ST EVELATION AMOUNT HE: NORMAL
CV STRESS TEST TYPE HE: NORMAL
CV STRESS TOTAL STAGE TIME MIN 1 HE: NORMAL
NUC STRESS EJECTION FRACTION: 61 %
RATE PRESSURE PRODUCT: NORMAL
STRESS ECHO BASELINE DIASTOLIC HE: 84
STRESS ECHO BASELINE HR: 85
STRESS ECHO BASELINE SYSTOLIC BP: 142
STRESS ECHO CALCULATED PERCENT HR: 99 %
STRESS ECHO LAST STRESS DIASTOLIC BP: 76
STRESS ECHO LAST STRESS HR: 165
STRESS ECHO LAST STRESS SYSTOLIC BP: 166
STRESS ECHO POST ESTIMATED WORKLOAD: 8.5
STRESS ECHO POST EXERCISE DUR MIN: 7
STRESS ECHO POST EXERCISE DUR SEC: 0
STRESS ECHO TARGET HR: 168

## 2020-11-23 ENCOUNTER — COMMUNICATION - HEALTHEAST (OUTPATIENT)
Dept: FAMILY MEDICINE | Facility: CLINIC | Age: 50
End: 2020-11-23

## 2020-12-05 ENCOUNTER — COMMUNICATION - HEALTHEAST (OUTPATIENT)
Dept: FAMILY MEDICINE | Facility: CLINIC | Age: 50
End: 2020-12-05

## 2020-12-05 DIAGNOSIS — E11.9 TYPE 2 DIABETES MELLITUS WITHOUT COMPLICATION, WITHOUT LONG-TERM CURRENT USE OF INSULIN (H): ICD-10-CM

## 2021-03-05 ENCOUNTER — OFFICE VISIT - HEALTHEAST (OUTPATIENT)
Dept: FAMILY MEDICINE | Facility: CLINIC | Age: 51
End: 2021-03-05

## 2021-03-05 DIAGNOSIS — L91.8 SKIN TAG: ICD-10-CM

## 2021-03-05 DIAGNOSIS — Z12.11 SCREEN FOR COLON CANCER: ICD-10-CM

## 2021-03-05 DIAGNOSIS — Z12.83 SKIN CANCER SCREENING: ICD-10-CM

## 2021-03-05 DIAGNOSIS — Z91.09 ENVIRONMENTAL ALLERGIES: ICD-10-CM

## 2021-03-05 DIAGNOSIS — Z12.5 SCREENING FOR PROSTATE CANCER: ICD-10-CM

## 2021-03-05 DIAGNOSIS — F32.0 MILD MAJOR DEPRESSION (H): ICD-10-CM

## 2021-03-05 DIAGNOSIS — Z00.00 ROUTINE GENERAL MEDICAL EXAMINATION AT A HEALTH CARE FACILITY: ICD-10-CM

## 2021-03-05 DIAGNOSIS — E29.1 HYPOGONADISM MALE: ICD-10-CM

## 2021-03-05 DIAGNOSIS — N52.9 ERECTILE DYSFUNCTION, UNSPECIFIED ERECTILE DYSFUNCTION TYPE: ICD-10-CM

## 2021-03-05 DIAGNOSIS — E66.01 MORBID OBESITY (H): ICD-10-CM

## 2021-03-05 DIAGNOSIS — Z11.4 ENCOUNTER FOR SCREENING FOR HIV: ICD-10-CM

## 2021-03-05 DIAGNOSIS — Z11.59 NEED FOR HEPATITIS C SCREENING TEST: ICD-10-CM

## 2021-03-05 DIAGNOSIS — E11.9 TYPE 2 DIABETES MELLITUS WITHOUT COMPLICATION, WITHOUT LONG-TERM CURRENT USE OF INSULIN (H): ICD-10-CM

## 2021-03-05 LAB
ALBUMIN SERPL-MCNC: 4.4 G/DL (ref 3.5–5)
ALP SERPL-CCNC: 79 U/L (ref 45–120)
ALT SERPL W P-5'-P-CCNC: 27 U/L (ref 0–45)
ANION GAP SERPL CALCULATED.3IONS-SCNC: 12 MMOL/L (ref 5–18)
AST SERPL W P-5'-P-CCNC: 18 U/L (ref 0–40)
BILIRUB SERPL-MCNC: 1.1 MG/DL (ref 0–1)
BUN SERPL-MCNC: 15 MG/DL (ref 8–22)
CALCIUM SERPL-MCNC: 9.2 MG/DL (ref 8.5–10.5)
CHLORIDE BLD-SCNC: 105 MMOL/L (ref 98–107)
CHOLEST SERPL-MCNC: 178 MG/DL
CO2 SERPL-SCNC: 24 MMOL/L (ref 22–31)
CREAT SERPL-MCNC: 1.04 MG/DL (ref 0.7–1.3)
CREAT UR-MCNC: 184.2 MG/DL
FASTING STATUS PATIENT QL REPORTED: YES
GFR SERPL CREATININE-BSD FRML MDRD: >60 ML/MIN/1.73M2
GLUCOSE BLD-MCNC: 172 MG/DL (ref 70–125)
HBA1C MFR BLD: 7.7 %
HDLC SERPL-MCNC: 47 MG/DL
HIV 1+2 AB+HIV1 P24 AG SERPL QL IA: NEGATIVE
LDLC SERPL CALC-MCNC: 107 MG/DL
MICROALBUMIN UR-MCNC: 1.56 MG/DL (ref 0–1.99)
MICROALBUMIN/CREAT UR: 8.5 MG/G
POTASSIUM BLD-SCNC: 4.1 MMOL/L (ref 3.5–5)
PROT SERPL-MCNC: 7.3 G/DL (ref 6–8)
PSA SERPL-MCNC: 0.4 NG/ML (ref 0–3.5)
SODIUM SERPL-SCNC: 141 MMOL/L (ref 136–145)
TRIGL SERPL-MCNC: 118 MG/DL

## 2021-03-05 ASSESSMENT — MIFFLIN-ST. JEOR: SCORE: 2338.7

## 2021-03-05 ASSESSMENT — ANXIETY QUESTIONNAIRES
6. BECOMING EASILY ANNOYED OR IRRITABLE: SEVERAL DAYS
4. TROUBLE RELAXING: MORE THAN HALF THE DAYS
2. NOT BEING ABLE TO STOP OR CONTROL WORRYING: SEVERAL DAYS
GAD7 TOTAL SCORE: 5
7. FEELING AFRAID AS IF SOMETHING AWFUL MIGHT HAPPEN: NOT AT ALL
3. WORRYING TOO MUCH ABOUT DIFFERENT THINGS: SEVERAL DAYS
1. FEELING NERVOUS, ANXIOUS, OR ON EDGE: NOT AT ALL
IF YOU CHECKED OFF ANY PROBLEMS ON THIS QUESTIONNAIRE, HOW DIFFICULT HAVE THESE PROBLEMS MADE IT FOR YOU TO DO YOUR WORK, TAKE CARE OF THINGS AT HOME, OR GET ALONG WITH OTHER PEOPLE: SOMEWHAT DIFFICULT
5. BEING SO RESTLESS THAT IT IS HARD TO SIT STILL: NOT AT ALL

## 2021-03-05 ASSESSMENT — PATIENT HEALTH QUESTIONNAIRE - PHQ9: SUM OF ALL RESPONSES TO PHQ QUESTIONS 1-9: 12

## 2021-03-08 LAB — HCV AB SERPL QL IA: NEGATIVE

## 2021-03-12 ENCOUNTER — RECORDS - HEALTHEAST (OUTPATIENT)
Dept: ADMINISTRATIVE | Facility: OTHER | Age: 51
End: 2021-03-12

## 2021-03-12 LAB — RETINOPATHY: NEGATIVE

## 2021-03-19 ENCOUNTER — RECORDS - HEALTHEAST (OUTPATIENT)
Dept: HEALTH INFORMATION MANAGEMENT | Facility: CLINIC | Age: 51
End: 2021-03-19

## 2021-03-25 ENCOUNTER — RECORDS - HEALTHEAST (OUTPATIENT)
Dept: ADMINISTRATIVE | Facility: OTHER | Age: 51
End: 2021-03-25

## 2021-06-02 ENCOUNTER — AMBULATORY - HEALTHEAST (OUTPATIENT)
Dept: LAB | Facility: CLINIC | Age: 51
End: 2021-06-02

## 2021-06-02 DIAGNOSIS — E29.1 HYPOGONADISM MALE: ICD-10-CM

## 2021-06-04 LAB — TESTOST SERPL-MCNC: 141 NG/DL (ref 221–716)

## 2021-06-08 ENCOUNTER — AMBULATORY - HEALTHEAST (OUTPATIENT)
Dept: FAMILY MEDICINE | Facility: CLINIC | Age: 51
End: 2021-06-08

## 2021-06-08 DIAGNOSIS — E29.1 HYPOGONADISM MALE: ICD-10-CM

## 2021-06-21 ENCOUNTER — AMBULATORY - HEALTHEAST (OUTPATIENT)
Dept: LAB | Facility: CLINIC | Age: 51
End: 2021-06-21

## 2021-06-21 DIAGNOSIS — E29.1 HYPOGONADISM MALE: ICD-10-CM

## 2021-06-23 LAB — TESTOST SERPL-MCNC: 326 NG/DL (ref 221–716)

## 2021-07-13 VITALS
OXYGEN SATURATION: 96 % | BODY MASS INDEX: 38.36 KG/M2 | WEIGHT: 315 LBS | DIASTOLIC BLOOD PRESSURE: 76 MMHG | HEIGHT: 76 IN | WEIGHT: 299.1 LBS | BODY MASS INDEX: 36.42 KG/M2 | TEMPERATURE: 96.6 F | DIASTOLIC BLOOD PRESSURE: 88 MMHG | HEART RATE: 86 BPM | OXYGEN SATURATION: 94 % | HEART RATE: 78 BPM | RESPIRATION RATE: 26 BRPM | SYSTOLIC BLOOD PRESSURE: 108 MMHG | SYSTOLIC BLOOD PRESSURE: 128 MMHG | HEIGHT: 76 IN

## 2021-07-13 VITALS
BODY MASS INDEX: 37.62 KG/M2 | BODY MASS INDEX: 37.48 KG/M2 | BODY MASS INDEX: 37.99 KG/M2 | WEIGHT: 312 LBS | HEIGHT: 76 IN | WEIGHT: 310 LBS | DIASTOLIC BLOOD PRESSURE: 82 MMHG | SYSTOLIC BLOOD PRESSURE: 122 MMHG | BODY MASS INDEX: 37 KG/M2 | BODY MASS INDEX: 38.41 KG/M2 | BODY MASS INDEX: 38.1 KG/M2 | WEIGHT: 308.9 LBS | BODY MASS INDEX: 37.75 KG/M2 | SYSTOLIC BLOOD PRESSURE: 108 MMHG | HEIGHT: 76 IN | WEIGHT: 311.4 LBS | WEIGHT: 307.8 LBS | BODY MASS INDEX: 37.73 KG/M2 | WEIGHT: 310 LBS | OXYGEN SATURATION: 97 % | HEART RATE: 78 BPM | BODY MASS INDEX: 35.98 KG/M2 | DIASTOLIC BLOOD PRESSURE: 82 MMHG | HEIGHT: 76 IN | HEIGHT: 76 IN | SYSTOLIC BLOOD PRESSURE: 134 MMHG | HEART RATE: 83 BPM | WEIGHT: 305 LBS | WEIGHT: 295.5 LBS | HEART RATE: 60 BPM | DIASTOLIC BLOOD PRESSURE: 70 MMHG | BODY MASS INDEX: 38.24 KG/M2 | WEIGHT: 305.9 LBS | OXYGEN SATURATION: 98 % | WEIGHT: 300 LBS

## 2021-07-13 ASSESSMENT — PATIENT HEALTH QUESTIONNAIRE - PHQ9
SUM OF ALL RESPONSES TO PHQ QUESTIONS 1-9: 3
SUM OF ALL RESPONSES TO PHQ QUESTIONS 1-9: 12
SUM OF ALL RESPONSES TO PHQ QUESTIONS 1-9: 9
SUM OF ALL RESPONSES TO PHQ QUESTIONS 1-9: 11

## 2021-07-19 NOTE — TELEPHONE ENCOUNTER
Telephone Encounter by Tiffani Cee at 4/23/2019 11:40 AM     Author: Tiffani Cee Service: -- Author Type: --    Filed: 4/23/2019 11:41 AM Encounter Date: 4/19/2019 Status: Signed    : Tiffani Cee APPROVED:    Approval start date: 4/23/19  Approval end date: 4/23/20    Pharmacy has been notified of approval and will contact patient when medication is ready for pickup.

## 2021-07-19 NOTE — TELEPHONE ENCOUNTER
Telephone Encounter by Valencia Hunt at 10/29/2019 10:19 AM     Author: Valencia Hunt Service: -- Author Type: --    Filed: 10/29/2019 10:22 AM Encounter Date: 10/22/2019 Status: Signed    : Valencia Hunt APPROVED:    Approval start date:10/22/2019  Approval end date:10/22/2020    Pharmacy has been notified of approval and will contact patient when medication is ready for pickup.

## 2021-07-19 NOTE — LETTER
Letter by Santiago Mariano MD at      Author: Santiago Mariano MD Service: -- Author Type: --    Filed:  Encounter Date: 4/11/2019 Status: (Other)         April 11, 2019     Patient: Morris Christopher   YOB: 1970   Date of Visit: 4/11/2019       To Whom It May Concern:        It is my medical opinion that Morris Christopher should remain out of work 4/11/19-4/15/19.    .    If you have any questions or concerns, please don't hesitate to call.    Sincerely,        Electronically signed by Santiago Mariano MD

## 2021-07-19 NOTE — LETTER
Letter by Santiago Mariano MD at      Author: Santiago Mariano MD Service: -- Author Type: --    Filed:  Encounter Date: 8/13/2020 Status: (Other)         August 13, 2020     Patient: Morris Christopher   YOB: 1970   Date of Visit: 8/13/2020       To Whom it May Concern:    Morris Christopher has tested positive with Covid-19 and may return to work on 8/18/20 without restrictions.     If you have any questions or concerns, please don't hesitate to call.    Sincerely,         Electronically signed by Santiago Mariano MD

## 2021-07-19 NOTE — LETTER
Letter by Santiago Mariano MD at      Author: Santiago Mariano MD Service: -- Author Type: --    Filed:  Encounter Date: 4/17/2019 Status: (Other)         April 22, 2019     Patient: Morris Christopher   YOB: 1970   Date of Visit: 4/17/2019       To Whom it May Concern:      Morris Christopher was seen in my clinic on 4/17/2019.  May return to work on 4/22/2019       If you have any questions or concerns, please don't hesitate to call.    Sincerely,         Electronically signed by Santiago Mariano MD       
Muscle strain

## 2021-07-21 NOTE — TELEPHONE ENCOUNTER
Left message to call back for: results  Information to relay to patient:  Ok to discuss results and recommendations upon return call.

## 2021-07-21 NOTE — TELEPHONE ENCOUNTER
Testosterone was authorized in June with 5 additional refills.   Please contact pharmacy to verify.   See [employer] for further information

## 2021-07-21 NOTE — TELEPHONE ENCOUNTER
"Called to pharmacy- They stated that the new prescription is needed . Previous script was \"inject 200 mg every 30 days\" along with another script inject 200 mg every 14 days.    Patient will need new script if injecting every 14 days   "

## 2021-07-21 NOTE — TELEPHONE ENCOUNTER
Patient Returning Call  Reason for call:  Return call  Information relayed to patient:  Message from Santiago Mariano MD sent at 4/12/2019 11:32 AM CDT -----     Ultrasound results:         Findings described to have Probable adenomyomatosis of the gallbladder ( characterized by changes of unknown etiology involving the gallbladder wall and causing overgrowth of the mucosa, and thickening of the muscular wall), though a benign condition, but may also be seen with cholecystitis.   Recommending a follow up consultation with [facility]   Referral has been placed, they will be calling to set up appt.      Lab results:      elevated hgb 18.3 upper limit of normal. Will monitor     Mildly elevated bilirubin and significantly elevated blood glucose in the range of Diabetes   Recommend a follow up in office to further discuss and treat (Please help schedule f/u appt)     Other labs normal       Patient has additional questions:  No  If YES, what are your questions/concerns:  n/a  Okay to leave a detailed message?: No call back needed

## 2021-07-21 NOTE — PROGRESS NOTES
"Morris Christopher is a 50 y.o. male who is being evaluated via a billable telephone visit.      The patient has been notified of following:     \"This telephone visit will be conducted via a call between you and your physician/provider. We have found that certain health care needs can be provided without the need for a physical exam.  This service lets us provide the care you need with a short phone conversation.  If a prescription is necessary we can send it directly to your pharmacy.  If lab work is needed we can place an order for that and you can then stop by our lab to have the test done at a later time.    Telephone visits are billed at different rates depending on your insurance coverage. During this emergency period, for some insurers they may be billed the same as an in-person visit.  Please reach out to your insurance provider with any questions.    If during the course of the call the physician/provider feels a telephone visit is not appropriate, you will not be charged for this service.\"    Patient has given verbal consent to a Telephone visit? Yes    What phone number would you like to be contacted at? [phone]    Patient would like to receive their AVS by AVS Preference: David.    Morris Christopher is a 50 y.o. male tele-visiting in follow-up of hypogonadism and diabetes.  His lab findings were found to be in abnormal range with the A1c of 9.5 increased from the last done 7 months ago.  His testosterone was also notable for the level of 140 substantially reduced from the last testing.  He attests to being compliant with his medications with no missed dosages.  He reports to feeling more fatigued and tired which could be attributed to the low testosterone level.    Assessment/Plan:  1. Hypogonadism male  Low testosterone level  Plan for twice a month injections for 2 months, recheck level then    Additional dosing sent to the preferred pharmacy  - testosterone cypionate 200 mg/mL Kit; Inject 200 " mg into the shoulder, thigh, or buttocks every 14 (fourteen) days.  Dispense: 2 kit; Refill: 0    2. Type 2 diabetes mellitus without complication, without long-term current use of insulin (H)  A1c not at goal  Discussed lowering the average daily blood sugar level to 150 or less  Plan to increase the metformin to 1000 mg twice daily  Follow diabetic diet    Start:  - metFORMIN (GLUCOPHAGE) 1000 MG tablet; Take 1 tablet (1,000 mg total) by mouth 2 (two) times a day with meals.  Dispense: 60 tablet; Refill: 0    Recheck A1c in 1 month    Phone call duration:  15 minutes    Santiago Mariano MD

## 2021-07-21 NOTE — TELEPHONE ENCOUNTER
----- Message from Thalia Smith sent at 2020 12:55 PM CDT -----  Regarding: Epi-Pen  Hello,     Patient has an old epi-pen from when he lived in [place], the pen has now  and hoping you could send in a new script for a new epi-pen. Please send to [facility] if appropriate.    Thank you!  Thalia Smith  [facility]acy Technician  [facility]  [phone]

## 2021-07-21 NOTE — TELEPHONE ENCOUNTER
----- Message from Blanca Silver MD sent at 11/23/2020  9:22 AM CST -----  Please let pt know that his stress test was negative. It did show that he has poor exercise tolerance for his age. I recommend that he work on getting regular exercise and losing weight to improve his cardiovascular endurance

## 2021-07-21 NOTE — TELEPHONE ENCOUNTER
Central PA team  [phone]  Pool: HE PA MED ([id number])          PA has been initiated.       PA form completed and faxed insurance via Cover My Meds     Key:  Your Tracking Number is [id number]     Medication:  OMEPRAZOLE 40MG    Insurance:  PREFERRED ONE        Response will be received via fax and may take up to 5-10 business days depending on plan

## 2021-07-21 NOTE — TELEPHONE ENCOUNTER
Refill Request: Testosterone   Last filled: 6.12.2020 disp 2 kits no refills   Last visit: 6.12.2020  1. Hypogonadism male  Low testosterone level  Plan for twice a month injections for 2 months, recheck level then     Additional dosing sent to the preferred pharmacy  - testosterone cypionate 200 mg/mL Kit; Inject 200 mg into the shoulder, thigh, or buttocks every 14 (fourteen) days.  Dispense: 2 kit; Refill: 0

## 2021-07-21 NOTE — TELEPHONE ENCOUNTER
Last Office Visit  8/21/2020 Dr Mariano    Notes:  1. SOB (shortness of breath)  Exam findings were discussed with the patient  He had him try an albuterol neb with no significant change in his breathing        - HM1(CBC and Differential)-normal  - BNP(B-type Natriuretic Peptide)- normal     - XR Chest 2 Views  Normal chest      Plan:   Stress echo      Consider reduced work hours.      Last Filled:  testosterone cypionate 200 mg/mL Kit 2 kit 0 8/7/2020  No   Sig - Route: Inject 200 mg into the shoulder, thigh, or buttocks every 14 (fourteen) days. - Intramuscular   Sent to pharmacy as: testosterone cypionate 200 mg/mL intramuscular kit   Notes to Pharmacy: This is in addition to the testosterone already on file , patient will be injecting twice monthly for the next month. Pls dispense with applicable syringes/ needles   E-Prescribing Status: Receipt confirmed by pharmacy (8/7/2020 12:09 PM CDT)       Next OV:  Visit date not found        Medication teed up for provider signature

## 2021-07-21 NOTE — TELEPHONE ENCOUNTER
----- Message from Santiago Mariano MD sent at 11/18/2019  2:35 PM CST -----  Elevated cholesterol   The 10-year ASCVD risk score (Alpineedda ALTAMIRANO Jr., et al., 2013) is: 9.3%  Recommending Statin (lipid lowering medication )

## 2021-07-21 NOTE — PROGRESS NOTES
Family Medicine Office Visit  [facility]  Patient Name: Morris Christopher  Patient Age: 49 y.o.  YOB: 1970  MRN: [id number]    Date of Visit: 2020  Reason for Office Visit:   Chief Complaint   Patient presents with     FMLA       Assessment / Plan / Medical Decision Makin. Signed FMLA paperwork    2. Diabetes Mellitus, Type 2 not on insulin  - A1c, urine for microalbumin, CMP, Lipid panel, monofilament testing in 3 months   - F/u in 3 months     3. Left chest pain  - Patient will schedule echo stress test, has referral    4. Primary Hypogonadism  - Testosterone cypionate  200 mg/mL IM monthly    5. Depression  - Continue on Zoloft 100mg PO daily     6. Health maintenance  - Patient will schedule colonoscopy, has referral     There are no diagnoses linked to this encounter.      Health Maintenance Review  Health Maintenance   Topic Date Due     DEPRESSION ACTION PLAN  1970     DIABETIC FOOT EXAM  1970     DIABETIC EYE EXAM  1970     HIV SCREENING  1983     TD 18+ HE  1986     TDAP ADULT ONE TIME DOSE  1986     ADVANCE CARE PLANNING  1986     PNEUMOCOCCAL IMMUNIZATION 19-64 MEDIUM RISK (1 of 1 - PPSV23) 1987     COLONOSCOPY  2018     ZOSTER VACCINES (1 of 2) 2018     A1C  2020     PREVENTIVE CARE VISIT  2020     BMP  2020     LIPID  2020     MICROALBUMIN  2020     INFLUENZA VACCINE RULE BASED  Completed         I am having Gagan Christopher maintain his blood glucose test, HYDROcodone-acetaminophen, sertraline, metFORMIN, and testosterone cypionate.      HPI:  Morris Christopher is a 49 y.o. year old who presents to the office today with FMLA paper work. He is taking 1-2 days off/month to take care of his wife with chronic illness, and would like FMLA paperwork signed to protect his job.     Patient is otherwise doing well. 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     He has DM2, not on insulin. Gagan checks his blood glucose levels twice a day. Fasting averages ~120, non-fasting ~130-140. He has lost 7 lbs with diet modification. He has also been more active recently. His last monofilament test was in [place] in 2017.     During his last visit, his cholesterol was elevated and a statin was recommended. Patient would like to have a lipid profile done with his other labs in 3 months to recheck levels prior to starting a statin.     Gagan also has a hx of depression characterized by low energy and apathy towards activities he once found interesting. Today, he states that he has been doing much better after starting the Zoloft. He has increased energy and has been participating in activities he finds enjoyable.     Gagan has been experiencing intermittent left sided chest pain for some time. He underwent a stress test and 24H Holter monitoring while in [place], which showed \"some PVCs\". He has a referral for an echo stress test and will set up an appointment when he has time.     Gagan has a Hx of primary hypogonadism and is on Testosterone injections, once monthly. States that energy levels have been good.     In terms of other health maintenance, he is due for a colonoscopy. Again, he has a referral and will set up the appointment when he has time.     Review of Systems- pertinent positive in bold:  Constitutional: Fever, chills, night sweats, fainting, weight change, fatigue, seizures, dizziness, sleeping difficulties, loud snoring/pauses in breathing  Eyes: change in vision, blurred or double vision, redness/eye pain  Ears, nose, mouth, throat: change in hearing, ear pain, hoarseness, difficulty swallowing, sores in the mouth or throat  Respiratory: shortness of breath, cough, bloody sputum, wheezing  Cardiovascular: chest pain, palpitations   Gastrointestinal: abdominal pain, heartburn/indigestion, nausea/vomiting, change in appetite, change in bowel habits, " constipation or diarrhea, rectal bleeding/dark stools, difficulty swallowing  Urinary: painful urination, frequent urination, urinary urgency/incontinence, blood in urine/dark urine, nocturia  Musculoskeletal: backache/back pain (new or increasing), weakness, joint pain/stiffness (new or increasing), muscle cramps, swelling of hands, feet, ankles, leg pain/redness  Skin: change in moles/freckles, rash, nodules  Hematologic/lymphatic: swollen lymph glands, abnormal bruising/bleeding  Endocrine: excessive thirst/urination, cold or heat intolerance  Neurologic/emotional: worrisome memory change, numbness/tingling, anxiety, mood swings      Current Scheduled Meds:  Outpatient Encounter Medications as of 2/20/2020   Medication Sig Dispense Refill     blood glucose test strips Use 1 each As Directed as needed (test daily). Dispense brand per patient's insurance at pharmacy discretion. 100 strip 3     HYDROcodone-acetaminophen 5-325 mg per tablet TK 1 T PO Q 6 H PRN P  0     metFORMIN (GLUCOPHAGE) 500 MG tablet Take 1 tablet (500 mg total) by mouth 2 (two) times a day with meals. 180 tablet 1     sertraline (ZOLOFT) 100 MG tablet Take 1 tablet (100 mg total) by mouth daily. 90 tablet 3     testosterone cypionate 200 mg/mL Kit Inject 200 mg into the shoulder, thigh, or buttocks every 30 (thirty) days. 1 kit 5     No facility-administered encounter medications on file as of 2/20/2020.      Past Medical History:   Diagnosis Date     Prediabetes 11/2/2018     Past Surgical History:   Procedure Laterality Date     KNEE ARTHROSCOPY      bilateral      SHOULDER ARTHROSCOPY  2016     TONSILLECTOMY       Social History     Tobacco Use     Smoking status: Never Smoker     Smokeless tobacco: Current User     Tobacco comment: Trying to quit    Substance Use Topics     Alcohol use: No     Drug use: No       Objective / Physical Examination:  There were no vitals filed for this visit.  Wt Readings from Last 3 Encounters:   11/14/19 (!)  317 lb (143.8 kg)   05/29/19 (!) 311 lb 6.4 oz (141.3 kg)   04/25/19 (!) 308 lb 14.4 oz (140.1 kg)     BP Readings from Last 3 Encounters:   11/14/19 128/88   05/29/19 112/78   04/25/19 120/86     There is no height or weight on file to calculate BMI.   Results for orders placed or performed in visit on 11/14/19   Glycosylated Hemoglobin A1c   Result Value Ref Range    Hemoglobin A1c 8.6 (H) 3.5 - 6.0 %   Comprehensive Metabolic Panel   Result Value Ref Range    Sodium 136 136 - 145 mmol/L    Potassium 4.1 3.5 - 5.0 mmol/L    Chloride 100 98 - 107 mmol/L    CO2 24 22 - 31 mmol/L    Anion Gap, Calculation 12 5 - 18 mmol/L    Glucose 251 (H) 70 - 125 mg/dL    BUN 16 8 - 22 mg/dL    Creatinine 1.09 0.70 - 1.30 mg/dL    GFR MDRD Af Amer >60 >60 mL/min/1.73m2    GFR MDRD Non Af Amer >60 >60 mL/min/1.73m2    Bilirubin, Total 0.9 0.0 - 1.0 mg/dL    Calcium 9.6 8.5 - 10.5 mg/dL    Protein, Total 7.6 6.0 - 8.0 g/dL    Albumin 4.3 3.5 - 5.0 g/dL    Alkaline Phosphatase 119 45 - 120 U/L    AST 21 0 - 40 U/L    ALT 32 0 - 45 U/L   Lipid Profile   Result Value Ref Range    Triglycerides 170 (H) <=149 mg/dL    Cholesterol 256 (H) <=199 mg/dL    LDL Calculated 168 (H) <=129 mg/dL    HDL Cholesterol 54 >=40 mg/dL    Patient Fasting > 8hrs? Yes    Microalbumin, Random Urine   Result Value Ref Range    Microalbumin, Random Urine 2.38 (H) 0.00 - 1.99 mg/dL    Creatinine, Urine 200.7 mg/dL    Microalbumin/Creatinine Ratio Random Urine 11.9 <=19.9 mg/g   PSA (Prostatic-Specific Antigen), Annual Screen   Result Value Ref Range    PSA 0.3 0.0 - 3.5 ng/mL   HM1 (CBC with Diff)   Result Value Ref Range    WBC 9.0 4.0 - 11.0 thou/uL    RBC 5.24 4.40 - 6.20 mill/uL    Hemoglobin 16.5 14.0 - 18.0 g/dL    Hematocrit 48.8 40.0 - 54.0 %    MCV 93 80 - 100 fL    MCH 31.4 27.0 - 34.0 pg    MCHC 33.8 32.0 - 36.0 g/dL    RDW 10.8 (L) 11.0 - 14.5 %    Platelets 248 140 - 440 thou/uL    MPV 7.4 7.0 - 10.0 fL    Neutrophils % 52 50 - 70 %    Lymphocytes  % 34 20 - 40 %    Monocytes % 10 2 - 10 %    Eosinophils % 3 0 - 6 %    Basophils % 1 0 - 2 %    Neutrophils Absolute 4.7 2.0 - 7.7 thou/uL    Lymphocytes Absolute 3.1 0.8 - 4.4 thou/uL    Monocytes Absolute 0.9 0.0 - 0.9 thou/uL    Eosinophils Absolute 0.3 0.0 - 0.4 thou/uL    Basophils Absolute 0.1 0.0 - 0.2 thou/uL   Testosterone, Total   Result Value Ref Range    Testosterone 506 221 - 716 ng/dL           General Appearance: Alert and oriented, cooperative, affect appropriate, speech clear, in no apparent distress  Head: Normocephalic, atraumatic  Ears: Tympanic membrane clear with landmarks well visualized bilaterally  Eyes: PERRL, fundi appear clear bilaterally. EOMI. Conjunctivae clear and sclerae non-icteric  Nose: Septum midline, nares patent, no visible polyps, mucosa moist and without drainage  Throat: Lips and mucosa moist. Teeth in good repair, pharynx without erythema or exudate  Neck: Supple, trachea midline. No cervical adenopathy  Back: Symmetrical and nontender  Lungs: Clear to auscultation bilaterally. Normal inspiratory and expiratory effort  Cardiovascular: Regular rate, normal S1, S2. No murmurs, rubs, or gallops  Abdomen: Bowel sounds active all four quadrants. Soft, non-tender. No hepatomegaly or splenomegaly. No bruits detected.   Extremities: Pulses 2+ and equal throughout. No edema. Strength equal throughout.  Integumentary: Warm and dry. Without suspicious looking lesions  Neuro: Alert and oriented, follows commands appropriately.     No orders of the defined types were placed in this encounter.  Followup: No follow-ups on file. earlier if needed.    Total time spent with patient was 20 with >50% of time spent in face-to-face counseling regarding the above plan       Susana Allen, MS3  [employer]  Class of 2021  10:58 AM  02/20/20

## 2021-07-21 NOTE — TELEPHONE ENCOUNTER
Lvm for patient to call back to schedule lab only appointment.    Please assist in scheduling appointment.    [employer] message sent

## 2021-07-21 NOTE — PROGRESS NOTES
I was consulted by Santiago Mariano MD to evaluate this patients gall bladder.    HPI: Morris Christopher is a 49 y.o. male who has been experiencing some problems with RUQ pain. he has been noting this for about 2 weeks with fairly persistent pain. It has been occurring each day of the week. It is not associated with nausea or vomiting.      Allergies:Bee venom protein (honey bee)    Past Medical History:   Diagnosis Date     Prediabetes 11/2/2018       Past Surgical History:   Procedure Laterality Date     KNEE ARTHROSCOPY      bilateral      SHOULDER ARTHROSCOPY  2016     TONSILLECTOMY         CURRENT MEDS:    Current Outpatient Medications:      sertraline (ZOLOFT) 50 MG tablet, Take 1 tablet (50 mg total) by mouth daily., Disp: 90 tablet, Rfl: 1     testosterone cypionate (DEPOTESTOTERONE CYPIONATE) 200 mg/mL injection, , Disp: , Rfl:      testosterone cypionate 200 mg/mL Kit, Inject 200 mg into the shoulder, thigh, or buttocks every 30 (thirty) days., Disp: 1 kit, Rfl: 3    Family History   Problem Relation Age of Onset     Lupus Mother      Fibromyalgia Mother      Hypertension Father      Heart attack Father      Diabetes Father      No Medical Problems Sister      No Medical Problems Brother      Diabetes Maternal Grandmother         reports that he has never smoked. He uses smokeless tobacco. He reports that he does not drink alcohol or use drugs.    Review of Systems:  10 system were reviewed and all are within normal limits except for those listed in the HPI.      EXAM:  /88   Pulse 81   Wt (!) 305 lb (138.3 kg)   BMI 37.62 kg/m    GENERAL: Well developed male , obese  EYES: Anicteric Sclera,  EOMI  CARDIAC: RRR w/out murmur  CHEST/LUNG: Clear to ascultation, No wheezes  ABDOMEN: Soft with, +Bowel Sounds  NEURO:No focal deficits, ambulatory  LYMPH: No Axillary or inguinal Adenopathy  EXTREMITIES: Ambulatory, No lower extremity deformities      LABS:  Lab Results   Component Value Date     WBC 6.7 04/11/2019    HGB 18.3 (H) 04/11/2019    HCT 53.6 04/11/2019    MCV 94 04/11/2019     04/11/2019     INR/Prothrombin Time  Results from last 7 days   Lab Units 04/11/19  1103   LN-SODIUM mmol/L 135*   LN-POTASSIUM mmol/L 4.0   LN-CHLORIDE mmol/L 102   LN-CO2 mmol/L 24   LN-BLOOD UREA NITROGEN mg/dL 20   LN-CREATININE mg/dL 1.08   LN-CALCIUM mg/dL 9.6     Lab Results   Component Value Date    ALT 23 04/11/2019    AST 17 04/11/2019    ALKPHOS 89 04/11/2019    BILITOT 1.5 (H) 04/11/2019       IMAGES:   EXAM DATE:         04/12/2019     EXAM: US ABDOMEN LIMITED  LOCATION: [facility]  DATE/TIME: 4/12/2019 8:30 AM     INDICATION: Upper abdominal pain, unspecified  COMPARISON: None.     FINDINGS:  GALLBLADDER: No intraluminal stones or gallbladder wall thickening. There is  ringdown artifact consistent with adenomyomatosis of the gallbladder wall.  BILE DUCTS: No bile duct dilation. Common duct measures 3 mm.  LIVER: Normal where seen. Main portal vein is patent with flow in the normal  direction.  RIGHT KIDNEY: 12.5 cm in length with a normal sonographic appearance.  PANCREAS: Not imaged in detail on this limited study. No incidental  abnormalities.     No ascites in the right upper quadrant.     CONCLUSION:  1.  Probable adenomyomatosis of the gallbladder. No gallstones or sonographic  Gray sign. No biliary dilatation or ascites.  2.  Normal sonographic appearance of the liver and visualized right kidney.    Assessment/Plan: Pt with signs and symptoms consistent with biliary colic but the US is not very convincing for cholecystitis.  I will plan to work this pt up further with a CT and a HIDA scan.  If they are showing signs of biliary disease then I will remove gall bladder.  In the mean time  I will try to rule out other causes by starting the pt on a PPI.    Lico Gibbons MD  [phone]  [facility] Department of Surgery

## 2021-07-21 NOTE — TELEPHONE ENCOUNTER
Controlled Substance Refill Request  Medication Name:   Requested Prescriptions     Pending Prescriptions Disp Refills     testosterone cypionate 200 mg/mL Kit 2 kit 0     Sig: Inject 200 mg into the shoulder, thigh, or buttocks every 14 (fourteen) days.     Date Last Fill: 8/7/20  Requested Pharmacy: [facility]  Submit electronically to pharmacy  Controlled Substance Agreement on file:   Encounter-Level CSA Scan Date:    There are no encounter-level csa scan date.        Last office visit:  8/21/20

## 2021-07-21 NOTE — TELEPHONE ENCOUNTER
Controlled Substance Refill Request  Medication Name:   Requested Prescriptions     Pending Prescriptions Disp Refills     metFORMIN (GLUCOPHAGE) 1000 MG tablet 60 tablet 0     Sig: Take 1 tablet (1,000 mg total) by mouth 2 (two) times a day with meals.     testosterone cypionate 200 mg/mL Kit 2 kit 0     Sig: Inject 200 mg into the shoulder, thigh, or buttocks every 14 (fourteen) days.     Date Last Fill: 6/12/20  Requested Pharmacy: [facility]  Submit electronically to pharmacy  Controlled Substance Agreement on file:   Encounter-Level CSA Scan Date:    There are no encounter-level csa scan date.        Last office visit:  6/12/20         Rx renewed per Medication Renewal policy  Metformin  Lab Results   Component Value Date    HGBA1C 9.5 (H) 06/01/2020    HGBA1C 8.6 (H) 11/14/2019    HGBA1C 7.0 (H) 04/17/2019     Lab Results   Component Value Date    MICROALBUR 2.38 (H) 11/14/2019    LDLCALC 168 (H) 11/14/2019    CREATININE 1.09 11/14/2019

## 2021-07-21 NOTE — TELEPHONE ENCOUNTER
PRIOR AUTHORIZATION  Who s requesting:  Pharmacy  What Pharmacy: [facility]  Medication Name: Testosterone  Insurance Plan: Preferred One  Insurance Member ID Number: [id number]  Informed patient that prior authorizations can take up to 10 busines...

## 2021-07-21 NOTE — TELEPHONE ENCOUNTER
Discussed patient with pharmacy.  Patient currently has refills on current prescription 8/19/19.    Patient advised to contact pharmacy to inquire as to what is needed.    Last refill:   testosterone cypionate 200 mg/mL Kit 1 kit 3 8/19/2019  No   Sig - Route: Inject 200 mg into the shoulder, thigh, or buttocks every 30 (thirty) days. - Intramuscular   Sent to pharmacy as: testosterone cypionate 200 mg/mL Kit   Notes to Pharmacy: pls dispense with applicable syringes/ needles   E-Prescribing Status: Receipt confirmed by pharmacy (8/19/2019  8:49 AM CDT)

## 2021-07-21 NOTE — PATIENT INSTRUCTIONS - [facility]
1. Upper abdominal pain  - Electrocardiogram Perform and Read  - Troponin I  - CK MB  - Lipase  - Comprehensive Metabolic Panel  - HM1(CBC and Differential)  - US Abdomen Limited; Future  - US Abdomen Limited  - HM1 (CBC with Diff)    We will follow-up pending the results of the study to manage accordingly.

## 2021-07-21 NOTE — PROGRESS NOTES
Assessment:        1. Routine general medical examination at a health care facility     2. Screen for colon cancer  Ambulatory referral for Colonoscopy - [facility]   3. Mild major depression (H)     4. Type 2 diabetes mellitus without complication, without long-term current use of insulin (H)  Glycosylated Hemoglobin A1c    Comprehensive Metabolic Panel    Microalbumin, Random Urine    Lipid Cascade FASTING   5. Hypogonadism male     6. Obesity (BMI 35.0-39.9) with comorbidity (H)     7. Environmental allergies  EPINEPHrine (EPIPEN/ADRENACLICK/AUVI-Q) 0.3 mg/0.3 mL injection   8. Skin tag  Ambulatory referral to Dermatology - [facility] [place]   9. Skin cancer screening  Ambulatory referral to Dermatology - [facility] [place]   10. Need for hepatitis C screening test  Hepatitis C Antibody (Anti-HCV)   11. Encounter for screening for HIV  HIV Antigen/Antibody Screening [place]   12. Screening for prostate cancer  PSA, Annual Screen (Prostatic-Specific Antigen)   13. Erectile dysfunction, unspecified erectile dysfunction type  sildenafiL (VIAGRA) 50 MG tablet       Plan:      1. Healthcare maintenance: Discussed healthy lifestyle recommendations.  Encouraged ongoing weight loss efforts, healthy diet and regular exercise.  Advised cessation of chewing tobacco and he is working on this.  Discussed recommended vaccinations.  He believes he had tetanus vaccine in 2016.  Encouraged COVID-19 vaccination.  Discussed shingles vaccine and pneumonia vaccines as well.  Encouraged regular vision and dental exams.  We will check PSA and perform screening test for HIV and hepatitis C.  Follow-up in 1 year for annual physical  2. Colon cancer screen: Referral placed for colonoscopy  3. Major depression, mild: Continue sertraline 150 mg daily.  Discussed that this dose can be increased if needed.  He is comfortable at current dose  4. Type 2 diabetes: Continue glipizide 5 mg twice daily.  He has  eye exam scheduled.  Blood pressure is controlled.  Continue with regular exercise, healthy diet and weight loss efforts.  We will check A1c, lipids, comprehensive metabolic panel and urine microalbumin today.  Recommend follow-up in 3 to 4 months  5. Hypogonadism: He continues testosterone injections  6. Obesity: Encouraged weight loss efforts  7. Allergies: Refill provided on EpiPen  8. Skin tag/skin cancer screening: Recommend he see dermatology.  Referral placed  9. Erectile dysfunction: provided Rx for viagra.  Counseled on use of medication and side effects.       I have had an Advance Directives discussion with the patient.  The following high BMI interventions were performed this visit: encouragement to exercise  I have counseled the patient for tobacco cessation and the follow up will occur  at the next visit.      Subjective:      Morris Christopher is a 50 y.o. male who presents for an annual exam.  Reviewed his current medications, allergies, family and social history and updated the chart.  He is working on cessation of use of chewing tobacco.  Has eye exam scheduled.  Needs to reschedule colonoscopy.  Doing well on glipizide 5 mg twice daily.  Blood sugars are improving.  7-day average is 178.  Still working on dietary and lifestyle changes.  Continues on sertraline for depression.  Under a lot of stress with work and caring for his wife.  Does not want to increase dose of sertraline at this time.  Noticing some paresthesias in his feet.  Concerned this may be related to his diabetes.  Noticing issues with erectile dysfunction.  Interested in medication for treatment of this.  He has noticed concerns with skin tags as well as moles that appear to be changing.  Has history of significant sun exposure.  He has never seen a dermatologist.  Review of systems otherwise negative.    Healthy Habits:   Regular Exercise: Yes  Sunscreen Use: Yes  Healthy Diet: Yes  Dental Visits Regularly: Yes  Seat Belt:  Yes  Sexually active: Yes  Monthly Self Testicular Exams:  Yes  Hemoccults: No  Flex Sig: No  Colonoscopy: No  Lipid Profile: Yes  Glucose Screen: Yes  Prevention of Osteoporosis: Yes  Last Dexa: N/A        Immunization History   Administered Date(s) Administered     INFLUENZA,SEASONAL QUAD, PF, =/> 6months 11/14/2019     Immunization status: stated as current, but no records available.      Current Outpatient Medications   Medication Sig Dispense Refill     EPINEPHrine (EPIPEN/ADRENACLICK/AUVI-Q) 0.3 mg/0.3 mL injection Inject 0.3 mL (0.3 mg total) as directed as needed for anaphylaxis. Inject into thigh. 2 Pre-filled Pen Syringe 3     glipiZIDE (GLUCOTROL) 5 MG tablet Take 1 tablet (5 mg total) by mouth 2 (two) times a day. Do not fill until pt requests 180 tablet 3     multivitamin (ONE A DAY) per tablet Take 1 tablet by mouth daily.       sertraline (ZOLOFT) 100 MG tablet Take 1.5 tablets (150 mg total) by mouth daily. 135 tablet 3     testosterone cypionate 200 mg/mL Kit Inject 200 mg into the shoulder, thigh, or buttocks every 14 (fourteen) days. 2 kit 3     sildenafiL (VIAGRA) 50 MG tablet Take 1 tablet (50 mg total) by mouth as needed for erectile dysfunction. 20 tablet 1     No current facility-administered medications for this visit.      Past Medical History:   Diagnosis Date     Arthritis 2004     Depression 2019     Diabetes mellitus (H) 2019     Prediabetes 11/2/2018     Past Surgical History:   Procedure Laterality Date     KNEE ARTHROSCOPY      bilateral      SHOULDER ARTHROSCOPY  2016     TONSILLECTOMY       Bee venom protein (honey bee)  Family History   Problem Relation Age of Onset     Lupus Mother      Fibromyalgia Mother      Kidney disease Mother      Clotting disorder Mother      Depression Mother      Early death Mother      Hypertension Father      Heart attack Father      Diabetes Father      Arthritis Father      No Medical Problems Sister      No Medical Problems Brother      Diabetes  Maternal Grandmother      Alcohol abuse Maternal Grandmother      Prostate cancer Maternal Grandfather      Alcohol abuse Maternal Grandfather      Cancer Maternal Grandfather      Colon cancer Paternal Grandmother      Arthritis Paternal Grandmother      Cancer Paternal Grandmother      Hypertension Paternal Grandmother      Drug abuse Maternal Aunt      Social History     Socioeconomic History     Marital status:      Spouse name: Not on file     Number of children: Not on file     Years of education: Not on file     Highest education level: Not on file   Occupational History     Not on file   Social Needs     Financial resource strain: Not on file     Food insecurity     Worry: Not on file     Inability: Not on file     Transportation needs     Medical: Not on file     Non-medical: Not on file   Tobacco Use     Smoking status: Never Smoker     Smokeless tobacco: Current User     Tobacco comment: Stopped using chewing tobacco 01/21   Substance and Sexual Activity     Alcohol use: No     Drug use: No     Sexual activity: Not Currently     Partners: Female     Birth control/protection: None   Lifestyle     Physical activity     Days per week: Not on file     Minutes per session: Not on file     Stress: Not on file   Relationships     Social connections     Talks on phone: Not on file     Gets together: Not on file     Attends Yarsani service: Not on file     Active member of club or organization: Not on file     Attends meetings of clubs or organizations: Not on file     Relationship status: Not on file     Intimate partner violence     Fear of current or ex partner: Not on file     Emotionally abused: Not on file     Physically abused: Not on file     Forced sexual activity: Not on file   Other Topics Concern     Not on file   Social History Narrative     Not on file       Review of Systems    See HPI      Objective:     Vitals:    03/05/21 0845   BP: 122/82   Pulse: 60   Weight: (!) 307 lb 12.8 oz (139.6  "kg)   Height: 6' 3.75\" (1.924 m)     Body mass index is 37.71 kg/m .    Physical  General Appearance: Alert, cooperative, no distress, appears stated age  Head: Normocephalic, without obvious abnormality, atraumatic  Eyes: PERRL, conjunctiva/corneas clear, EOM's intact  Ears: Normal TM's and external ear canals, both ears  Neck: Supple, symmetrical, trachea midline, no adenopathy;  thyroid: not enlarged, symmetric, no tenderness/mass/nodules;   Lungs: Clear to auscultation bilaterally, respirations unlabored  Heart: Regular rate and rhythm, S1 and S2 normal, no murmur, rub, or gallop,  Abdomen: Soft, non-tender, bowel sounds active all four quadrants,  no masses, no organomegaly  Musculoskeletal: Normal range of motion. No joint swelling or deformity.   Extremities: Extremities normal, atraumatic, no cyanosis or edema  Neurologic: He is alert. He has normal reflexes.   Psychiatric: He has a normal mood and affect.   Diabetic foot exam: normal DP and PT pulses, no trophic changes or ulcerative lesions and normal sensory exam. Able to feel monofilament at all touchpoints but does notice that some areas feel dull compared to other areas feeling more sharp               "

## 2021-07-21 NOTE — PATIENT INSTRUCTIONS - [facility]
increase sertraline to 150 mg daily    Start glipizide 5 mg daily    See diabetic educator.  Consider starting GLP-1 inhibibitor    See nutritionist     Schedule stress test    Schedule fasting lipid test    Send blood sugar readings to me through my chart

## 2021-07-21 NOTE — TELEPHONE ENCOUNTER
Refill Approved    Rx renewed per Medication Renewal Policy. Medication was last renewed on 7/27/20.    [facility], [facility] Triage/Med Refill 10/28/2020     Requested Prescriptions   Pending Prescriptions Disp Refills     metFORMIN (GLUCOPHAGE) 1000 MG tablet 180 tablet 0     Sig: Take 1 tablet (1,000 mg total) by mouth 2 (two) times a day with meals.       Metformin Refill Protocol Passed - 10/27/2020 11:09 AM        Passed - Blood pressure in last 12 months     BP Readings from Last 1 Encounters:   08/21/20 108/76             Passed - LFT or AST or ALT in last 12 months     Albumin   Date Value Ref Range Status   11/14/2019 4.3 3.5 - 5.0 g/dL Final     Bilirubin, Total   Date Value Ref Range Status   11/14/2019 0.9 0.0 - 1.0 mg/dL Final     Alkaline Phosphatase   Date Value Ref Range Status   11/14/2019 119 45 - 120 U/L Final     AST   Date Value Ref Range Status   11/14/2019 21 0 - 40 U/L Final     ALT   Date Value Ref Range Status   11/14/2019 32 0 - 45 U/L Final     Protein, Total   Date Value Ref Range Status   11/14/2019 7.6 6.0 - 8.0 g/dL Final                Passed - GFR or Serum Creatinine in last 6 months     GFR MDRD Non Af Amer   Date Value Ref Range Status   11/14/2019 >60 >60 mL/min/1.73m2 Final     GFR MDRD Af Amer   Date Value Ref Range Status   11/14/2019 >60 >60 mL/min/1.73m2 Final             Passed - Visit with PCP or prescribing provider visit in last 6 months or next 3 months     Last office visit with prescriber/PCP: 8/21/2020 OR same dept: 8/21/2020 Santiago Mariano MD OR same specialty: 8/21/2020 Santiago Mariano MD Last physical: Visit date not found Last MTM visit: Visit date not found         Next appt within 3 mo: Visit date not found  Next physical within 3 mo: Visit date not found  Prescriber OR PCP: Santiago Mariano MD  Last diagnosis associated with med order: 1. Type 2 diabetes mellitus without complication, without long-term current use of insulin  (H)  - metFORMIN (GLUCOPHAGE) 1000 MG tablet; Take 1 tablet (1,000 mg total) by mouth 2 (two) times a day with meals.  Dispense: 180 tablet; Refill: 0     If protocol passes may refill for 12 months if within 3 months of last provider visit (or a total of 15 months).           Passed - A1C in last 6 months     Hemoglobin A1c   Date Value Ref Range Status   06/01/2020 9.5 (H) 3.5 - 6.0 % Final               Passed - Microalbumin in last year      Microalbumin, Random Urine   Date Value Ref Range Status   11/14/2019 2.38 (H) 0.00 - 1.99 mg/dL Final

## 2021-07-21 NOTE — TELEPHONE ENCOUNTER
pls call patient to have the level checked ( testosterone ) to better guide in therapy   See lab order

## 2021-07-21 NOTE — TELEPHONE ENCOUNTER
Called to pt and pt states that he is over due for his shot and that his levels will be off.  Would like refill and then will check the levels.    Lab Results   Component Value Date    TESTOSTERONE 140 (L) 06/01/2020

## 2021-07-21 NOTE — TELEPHONE ENCOUNTER
Fax received from [facility] pharmacy requesting Prior Authorization    Medication Name: Omeprazole 40mg    Insurance Plan: Preferred One   PBM:    Patient ID Number: [id number]    Please start PA process for Qty Limit

## 2021-07-21 NOTE — PROGRESS NOTES
"Morris Christopher is a 50 y.o. male who is being evaluated via a billable telephone visit.      The patient has been notified of following:     \"This telephone visit will be conducted via a call between you and your physician/provider. We have found that certain health care needs can be provided without the need for a physical exam.  This service lets us provide the care you need with a short phone conversation.  If a prescription is necessary we can send it directly to your pharmacy.  If lab work is needed we can place an order for that and you can then stop by our lab to have the test done at a later time.    Telephone visits are billed at different rates depending on your insurance coverage. During this emergency period, for some insurers they may be billed the same as an in-person visit.  Please reach out to your insurance provider with any questions.    If during the course of the call the physician/provider feels a telephone visit is not appropriate, you will not be charged for this service.\"    Patient has given verbal consent to a Telephone visit? Yes    What phone number would you like to be contacted at? [phone]    Patient would like to receive their AVS by AVS Preference: DavidFarzana Farah is a 51-year-old male who Tested positive for COVID-19 on 7/22/20, with fevers cough and shortness of breath.  He has been self quarantining himself since then the progressive improvement of symptoms.  He has not had a fever for the past 3 days but remains mild to moderately short of breath.  He plans to return to work next week.    Assessment/Plan:  1. 2019 novel coronavirus disease (COVID-19)  Testing positive on 7/22/2020  A return to work without restrictions on 8/13/2020        Phone call duration:  12 minutes    Santiago Mariano MD  "

## 2021-07-21 NOTE — TELEPHONE ENCOUNTER
Patient Returning Call  Reason for call:  Patient returning call to the clinic.  Information relayed to patient:  Yes as instructed and patient agrees with plan.  Patient has additional questions:  yes  If YES, what are your questions/concerns:  Couldn't they of sent this in a text message I was on hold for 20 minutes for this.  Okay to leave a detailed message?: Yes

## 2021-07-21 NOTE — PROGRESS NOTES
Assessment/Plan:        1. Upper abdominal pain  Recent labs and evaluations were reviewed   Plan:   - Ambulatory referral to Gastroenterology  - omeprazole (PRILOSEC) 40 MG capsule; Take 1 capsule (40 mg total) by mouth daily before breakfast.  Dispense: 30 capsule; Refill: 0    2. Prediabetes/ Blood glucose elevated  Discussed management with :   - metFORMIN (GLUCOPHAGE) 500 MG tablet; Take 1 tablet (500 mg total) by mouth 2 (two) times a day with meals.  Dispense: 60 tablet; Refill: 0  Make an attempt to improve diet, cut back on the carbs and fats,  and exercise more efficiently to aid in medical management of this problem.     Recheck in 6 months.     3. Depression  Not optimized   Plan:   - sertraline (ZOLOFT) 100 MG tablet; Take 1 tablet (100 mg total) by mouth daily.  Dispense: 30 tablet; Refill: 0      Follow up in a month       At the conclusion of the encounter the plan of care, disposition and all questions were answered and reviewed, and the patient acknowledged understanding and was involved in the decision making regarding the overall care plan.     25 minutes or greater were spent with the patient today with greater than 50% of the times spent in counseling and management of care.      Subjective:    Patient ID:   Morris Christopher is a 49 y.o. male presenting with his wife, following up on upper abdominal pain, diabetes and recent labs.   He also needs a refill on his zoloft. He feels that could do better, and inquiring about a higher dose.   He has no additional concerns today.          Objective:   /86 (Patient Site: Right Arm, Patient Position: Sitting, Cuff Size: Adult Large)   Pulse 87   Wt (!) 308 lb 14.4 oz (140.1 kg)   SpO2 93%   BMI 38.10 kg/m

## 2021-07-21 NOTE — TELEPHONE ENCOUNTER
Epigastric.pain.started Sunday.    Resting and has not eaten anything since Monday.  Loss of appetite. Cannot eat.  Is asking for an appointment with Dr. Mariano for either today or tomorrow.    Appointment made for tomorrow  With Dr. RODGER Elias RN  [facility] Triage/refill nurse

## 2021-07-21 NOTE — TELEPHONE ENCOUNTER
Controlled Substance Refill Request  Medication Name:   Requested Prescriptions     Pending Prescriptions Disp Refills     testosterone cypionate 200 mg/mL Kit 2 kit 0     Sig: Inject 200 mg into the shoulder, thigh, or buttocks every 14 (fourteen) days.     Date Last Fill: 6/12/20  Requested Pharmacy: [facility]  Submit electronically to pharmacy  Controlled Substance Agreement on file:   Encounter-Level CSA Scan Date:    There are no encounter-level csa scan date.        Last office visit:  6/12/20

## 2021-07-21 NOTE — PROGRESS NOTES
"Morris Christopher is a 50 y.o. male who is being evaluated via a billable video visit.      The patient has been notified of following:     \"This video visit will be conducted via a call between you and your physician/provider. We have found that certain health care needs can be provided without the need for an in-person physical exam.  This service lets us provide the care you need with a video conversation.  If a prescription is necessary we can send it directly to your pharmacy.  If lab work is needed we can place an order for that and you can then stop by our lab to have the test done at a later time.    Video visits are billed at different rates depending on your insurance coverage. Please reach out to your insurance provider with any questions.    If during the course of the call the physician/provider feels a video visit is not appropriate, you will not be charged for this service.\"  Also get an apartment  Patient has given verbal consent to a Video visit? Yes    Patient would like to receive their AVS by AVS Preference: David.    Patient would like the video invitation sent by: Text to cell phone: [phone]    Will anyone else be joining your video visit? No        Video Start Time: PATIENT NO SHOWED THE VISIT     Santiago Mariano MD epinephrine  "

## 2021-07-21 NOTE — TELEPHONE ENCOUNTER
Patient sent a [employer] message stating that a Prior Authorization is needed for the Testosterone kit.  Forwarded to Prior Authorization department for review.

## 2021-07-21 NOTE — TELEPHONE ENCOUNTER
Called and spoke with pt explained pt is due for a depression follow up. Pt is scheduled with Dr. Mariano on 5- at 12pm.

## 2021-07-21 NOTE — PROGRESS NOTES
Assessment/Plan:        1. Routine history and physical examination of adult    - HM1(CBC and Differential)  - Thyroid Stimulating Hormone (TSH); Future  - Comprehensive Metabolic Panel  - Lipid Profile; Future  - HM1 (CBC with Diff)  - Thyroid Stimulating Hormone (TSH)  - Lipid Profile    2. Screen for colon cancer    - Ambulatory referral for Colonoscopy    3. Male hypogonadism  Check labs:   - Luteinizing Hormone (LH)  - Follicle Stimulating Hormone (FSH)  - Testosterone, Total and Free  - PSA (Prostatic-Specific Antigen), Annual Screen  We will follow-up pending the results of the study to manage accordingly       4. Depression  Treatment options were reviewed and discussed   Plan:   - sertraline (ZOLOFT) 50 MG tablet; Take 1 tablet (50 mg total) by mouth daily.  Dispense: 30 tablet; Refill: 0  follow up in a month       5. Screening for prostate cancer  Referral to colonoscopy     Additional 25  minutes spent on this visit with more than 50% time spent regarding male hypogonadism, depression, treatment options, and discussion of the results.        ______________________________________________________________________     Morris Christopher is a 48 y.o. male coming in today for a routine physical.    Other issues to discuss and evaluate today:    1. Male hypogonadism   H/o and On testosterone therapy for the past 2 years, with the sx of chronic Fatiuged, and ED,   He is no better with the supplement having improved energy level. However, the ED issue has not improved much, yet not being concerned about it much     He gives self injections every 2 weeks, and Last testosterone level was last January, which also included a1c, cbc, lipids, but the prostate was not checked.      2. Depression   Lot going on with the family, and his wife's  Disability ( she is still living in [place] )   Not on antidepressants at this time, stating that living day by day.                  Past Medical History:   Diagnosis Date      Prediabetes 11/2/2018       Past Surgical History:   Procedure Laterality Date     KNEE ARTHROSCOPY      bilateral      SHOULDER ARTHROSCOPY  2016     TONSILLECTOMY          Family History   Problem Relation Age of Onset     Lupus Mother      Fibromyalgia Mother      Hypertension Father      Heart attack Father      Diabetes Father      No Medical Problems Sister      No Medical Problems Brother      Diabetes Maternal Grandmother        Social History     Social History     Marital status:      Spouse name: N/A     Number of children: N/A     Years of education: N/A     Social History Main Topics     Smoking status: Never Smoker     Smokeless tobacco: Current User      Comment: Trying to quit      Alcohol use No     Drug use: No     Sexual activity: Not Asked     Other Topics Concern     None     Social History Narrative     None        Current Outpatient Prescriptions   Medication Sig Note     testosterone cypionate (DEPOTESTOTERONE CYPIONATE) 200 mg/mL injection  11/2/2018: Received from: External Pharmacy     sertraline (ZOLOFT) 50 MG tablet Take 1 tablet (50 mg total) by mouth daily.          There is no immunization history on file for this patient.   ______________________________________________________________________     ROS:  General : negative  Ophthalmic : negative  ENT : negative  Respiratory : no cough, shortness of breath, or wheezing  Cardiovascular : no chest pain or dyspnea on exertion  Gastrointestinal : no abdominal pain, change in bowel habits, or black or bloody stools  Genito-Urinary : no dysuria, trouble voiding, or hematuria  Musculoskeletal : negative  Neurological : negative  Dermatological : negative    Allergy and Immunology : negative  Hematological and Lymphatic : negative  Endocrine : see HPI   Psych: See HPI     ______________________________________________________________________     Exam:    Wt Readings from Last 3 Encounters:   11/02/18 (!) 295 lb 8 oz (134 kg)      BP  "Readings from Last 3 Encounters:   11/02/18 120/82     /82 (Patient Site: Right Arm, Patient Position: Sitting, Cuff Size: Adult Large)  Pulse 85  Temp 98  F (36.7  C) (Oral)   Ht 6' 3.5\" (1.918 m)  Wt (!) 295 lb 8 oz (134 kg)  SpO2 95%  BMI 36.45 kg/m2     General appearance - alert, well appearing, and in no distress  Eyes - pupils equal and reactive, extraocular eye movements intact  ENT - bilateral TM's and external ear canals normal,  mucous membranes moist, pharynx normal without lesions  Neck - supple, no adenopathy, normal thyroid   Chest - clear to auscultation, no wheezes, rales or rhonchi, symmetric air entry  Heart - normal rate, regular rhythm, normal S1, S2, no murmurs, rubs, clicks or gallops  Abdomen - soft, nontender, nondistended, no masses or organomegaly   Male - no penile lesions or discharge, no testicular masses or tenderness, no hernias  Rectal - negative without mass, lesions or tenderness, PROSTATE EXAM: smooth and symmetric without nodules or tenderness  Back exam - full range of motion, no tenderness, palpable spasm or pain on motion  Neurological - Grossly intact, alert, oriented, normal speech, no focal findings or movement disorder noted,   Musculoskeletal - no joint tenderness, deformity or swelling  Extremities - peripheral pulses normal, no pedal edema, no clubbing or cyanosis  Skin - normal coloration and turgor, no rashes, no suspicious skin lesions noted  Lymphatics - no palpable lymphadenopathy       "

## 2021-07-21 NOTE — PROGRESS NOTES
Assessment/Plan:        1. Blood glucose elevated    2. Prediabetes    3. Hypogonadism male       History of prediabes with elevated blood glucose   We discussed checking on the HgA1c to further guide us through management.     Plan:    A1c, lipid profile, microalbumin, testosterone total    HgA1c returned at 7.1.   At this point recommending diet control vs addition of medication.   Follow up in 1-3 months.       25 minutes or greater were spent with the patient today with greater than 50% of the times spent in counseling and management of care.        Subjective:    Patient ID:   Morris Christopher is a 49 y.o. male comes in to discuss and review recent laboratory work/findings, revealing blood glucose in range with diabetes.  He is also having follow-up G. Surgery consultation for abdominal pain later today.  He acknowledges to previously known to be prediabetic.  He is not diet controlled and not checking his blood glucose regularly.     He needs his testosterone level rechecked today   He has no other concerns.       The following patient's history were reviewed and updated as appropriate:   He  has a past medical history of Prediabetes (11/2/2018)..      Outpatient Encounter Medications as of 4/17/2019   Medication Sig Dispense Refill     sertraline (ZOLOFT) 50 MG tablet Take 1 tablet (50 mg total) by mouth daily. 90 tablet 1     testosterone cypionate (DEPOTESTOTERONE CYPIONATE) 200 mg/mL injection        [DISCONTINUED] testosterone cypionate 200 mg/mL Kit Inject 200 mg into the shoulder, thigh, or buttocks every 30 (thirty) days. 1 kit 3     No facility-administered encounter medications on file as of 4/17/2019.          Objective:   /88 (Patient Site: Right Arm, Patient Position: Sitting, Cuff Size: Adult Large)   Pulse 81   Wt (!) 305 lb 14.4 oz (138.8 kg)   SpO2 98%   BMI 37.73 kg/m

## 2021-07-21 NOTE — TELEPHONE ENCOUNTER
----- Message from Santiago Mariano MD sent at 4/12/2019 11:32 AM CDT -----    Ultrasound results:       Findings described to have Probable adenomyomatosis of the gallbladder ( characterized by changes of unknown etiology involving the gallbladder wall and causing overgrowth of the mucosa, and thickening of the muscular wall), though a benign condition, but may also be seen with cholecystitis.   Recommending a follow up consultation with G. Surgery   Referral has been placed, they will be calling to set up appt.     Lab results:     elevated hgb 18.3 upper limit of normal. Will monitor    Mildly elevated bilirubin and significantly elevated blood glucose in the range of Diabetes   Recommend a follow up in office to further discuss and treat (Please help schedule f/u appt)    Other labs normal

## 2021-07-21 NOTE — TELEPHONE ENCOUNTER
Testosterone lab/level need to be rechecked before a refill can be sent  Please inform patient, and see epic for the orders

## 2021-07-21 NOTE — PATIENT INSTRUCTIONS - HE
1. Routine general medical examination at a health care facility  - Lipid Profile  - PSA (Prostatic-Specific Antigen), Annual Screen    2. Type 2 diabetes mellitus without complication, without long-term current use of insulin (H)  - Glycosylated Hemoglobin A1c  - Comprehensive Metabolic Panel  - Microalbumin, Random Urine    3. Depression  - sertraline (ZOLOFT) 100 MG tablet; Take 1 tablet (100 mg total) by mouth daily.  Dispense: 90 tablet; Refill: 3    4. Screen for colon cancer  - Ambulatory referral for Colonoscopy    5. Left-sided chest pain  - Echo Stress Exercise; Future    6. Encounter for screening for cardiovascular disorders  - Echo Stress Exercise; Future    7. Hypogonadism male  - HM1(CBC and Differential)  - HM1 (CBC with Diff)  - Testosterone, Total

## 2021-07-21 NOTE — PROGRESS NOTES
Assessment/Plan:        1. Type 2 diabetes mellitus without complication, without long-term current use of insulin (H)  Continue current management   Lab Results   Component Value Date    HGBA1C 7.0 (H) 04/17/2019       - metFORMIN (GLUCOPHAGE) 500 MG tablet; Take 1 tablet (500 mg total) by mouth 2 (two) times a day with meals.  Dispense: 180 tablet; Refill: 1  - blood glucose test strips; Use 1 each As Directed as needed (test daily). Dispense brand per patient's insurance at pharmacy discretion.  Dispense: 100 strip; Refill: 3    Recheck in 6 months.     2. Depression  Continue current management   - sertraline (ZOLOFT) 100 MG tablet; Take 1 tablet (100 mg total) by mouth daily.  Dispense: 90 tablet; Refill: 1          Recheck in 6 months.      25 minutes or greater were spent with the patient today with greater than 50% of the times spent in counseling and management of care.      Subjective:    Patient ID:   Morris Christopher is a 49 y.o. male comes in for med check and follow-up    1. Upper abdominal pain  Has tried the prilosec without help.   Has an appointment with Gastro coming up in June 2.  Diabetes  On   - metFORMIN (GLUCOPHAGE) 500 MG tablet; two times a day     BG's in the 120's range          3. Depression  Managed on  - sertraline (ZOLOFT) with the dose increased to 100 MG tablet a month ago.   He reports to not noticing a whole lot of improvement.  Still Feeling exhausted, although attesting to working a lot along with taking the night shifts, not having enough sleep due to his work schedule.         Review of Systems  Allergy: reviewed  General : negative  A complete 5 point review of systems was obtained and is negative other than what is stated in the HPI.      The following patient's history were reviewed and updated as appropriate:   He  has a past medical history of Prediabetes (11/2/2018)..      Outpatient Encounter Medications as of 5/29/2019   Medication Sig Dispense Refill      metFORMIN (GLUCOPHAGE) 500 MG tablet Take 1 tablet (500 mg total) by mouth 2 (two) times a day with meals. 60 tablet 0     sertraline (ZOLOFT) 100 MG tablet Take 1 tablet (100 mg total) by mouth daily. 30 tablet 0     testosterone cypionate 200 mg/mL Kit Inject 200 mg into the shoulder, thigh, or buttocks every 30 (thirty) days. 1 kit 3     [DISCONTINUED] omeprazole (PRILOSEC) 40 MG capsule Take 1 capsule (40 mg total) by mouth daily before breakfast. 30 capsule 0     [DISCONTINUED] testosterone cypionate (DEPOTESTOTERONE CYPIONATE) 200 mg/mL injection        No facility-administered encounter medications on file as of 5/29/2019.          Objective:   /78 (Patient Site: Right Arm, Patient Position: Sitting, Cuff Size: Adult Large)   Pulse 68   Wt (!) 311 lb 6.4 oz (141.3 kg)   SpO2 97%   BMI 38.41 kg/m

## 2021-07-21 NOTE — TELEPHONE ENCOUNTER
"Who is calling:  Patient via [employer]  Reason for Call:  Please see the message thread below through [employer].  This patient scheduled his own appointment via [employer] as an annual physical, but mentioned in the comments he was having dental surgery.  The visit was changed to a pre-op, per [employer] work flow, but the patient is now saying he does not need a pre-op.    Unable to change this back to a physical, as the physical limit has been reached for this day with Dr. HAM Kramer please address and add patient back on as an annual physical if possible.      From [employer] message thread, message reads from bottom to top:      This is not a pre op visit and I never requested a pre anything. My dental surgery is on the 8th and has nothing to do with my appt. for my physical on the 14th.  ----- Message -----  From: Sandi CHAHAL  Sent: 10/30/2019  9:35 AM CDT  To: Morris Christopher  Subject: RE: Appointment rescheduled from [employer]  Thank you for using [employer]  This appointment has been changed to a pre-operative exam.  You are not able to be seen for this and an annual physical on the same day.    Please supply us with the following information regarding your upcoming surgery:    Name of surgeon  Facility surgery is taking place at     If you would  like to schedule an additional appointment for your physical, please use the information below:    Either click on the \"visits\" calendar at the top of the home screen, and then on \"schedule an appointment\" or you may click on the \"schedule an appointment\" calendar under \"quick links\"  on the right hand side of the screen.    Choose your visit type by clicking  \"annual physical\"  (use annual physical for well child checks, adult annual wellness checks and adult annual physicals including pap smear and pelvic exam for women)  Update the calendar and time settings on the left side of your screen to filter by what works for you.  Choose the date and time you prefer from the boxes in the " middle of the screen.     Follow the on screen prompts to schedule the appointment.    Thank you     ----- Message -----     From: Morris BECERRAFarzana Christopher     Sent: 10/30/2019  9:24 AM CDT       To: Patient Appointment Schedule Request Message List  Subject: RE: Appointment rescheduled from [facility]    Appointment For: Morris Christopher ([id number])  Visit Type: PHYSICAL ([id number])    11/14/2019   8:15 AM  30 mins.  Santiago Mariano MD [facility]    Patient Comments:  Scheduled dental surgery on the 8th for abscess.    ----------------------------------  This appointment rescheduled from:  11/8/2019    8:15 AM  30 mins.  Santiago Mariano MD [facility]          Date of last appointment with primary care: n/a  Okay to leave a detailed message: Not able to ask, scheduled via [facility]

## 2021-07-21 NOTE — PROGRESS NOTES
Assessment/Plan:        1. SOB (shortness of breath)  Exam findings were discussed with the patient  He had him try an albuterol neb with no significant change in his breathing      - HM1(CBC and Differential)-normal  - BNP(B-type Natriuretic Peptide)- normal    - XR Chest 2 Views  Normal chest     Plan:   Stress echo     Consider reduced work hours.      At the conclusion of the encounter the plan of care, disposition and all questions were answered and reviewed, and the patient acknowledged understanding and was involved in the decision making regarding the overall care plan.           Subjective:    Patient ID:   Morris Christopher is a 50 y.o. male with a recent history of COVID, presenting with ongoing shortness of breath worse on exertion.  He has tried the albuterol and advair inhalers without help (which was leftover from 2 yrs ago).   He has no fever chills sore throat or any other upper respiratory symptoms.      Review of Systems  Allergy: reviewed  General : negative  A complete 7 point review of systems was obtained and is negative other than what is stated in the HPI.         The following patient's history were reviewed and updated as appropriate:   He  has a past medical history of Prediabetes (11/2/2018)..      Outpatient Encounter Medications as of 8/21/2020   Medication Sig Dispense Refill     blood glucose test strips Use 1 each As Directed as needed (test daily). Dispense brand per patient's insurance at pharmacy discretion. 100 strip 3     EPINEPHrine (EPIPEN/ADRENACLICK/AUVI-Q) 0.3 mg/0.3 mL injection Inject 0.3 mL (0.3 mg total) as directed as needed for anaphylaxis. Inject into thigh. 2 Pre-filled Pen Syringe 0     metFORMIN (GLUCOPHAGE) 1000 MG tablet Take 1 tablet (1,000 mg total) by mouth 2 (two) times a day with meals. 180 tablet 0     sertraline (ZOLOFT) 100 MG tablet Take 1 tablet (100 mg total) by mouth daily. 90 tablet 3     testosterone cypionate 200 mg/mL Kit Inject 200 mg into the  "shoulder, thigh, or buttocks every 14 (fourteen) days. 2 kit 0     No facility-administered encounter medications on file as of 8/21/2020.          Objective:   /76 (Patient Site: Right Arm, Patient Position: Sitting, Cuff Size: Adult Large)   Pulse 86   Temp 96.6  F (35.9  C) (Tympanic)   Resp 26   Ht 6' 3.5\" (1.918 m)   Wt (!) 299 lb 1.6 oz (135.7 kg)   SpO2 96%   BMI 36.89 kg/m        Physical Exam  General Appearance:    Alert, well hydrated, no distress   Throat:   mucous membranes moist, pharynx normal without lesions   Neck:   Supple, symmetrical, trachea midline, no adenopathy;     thyroid:  no enlargement/tenderness/nodules;    Lungs:     clear to auscultation, no wheezes, rales or rhonchi, symmetric air entry     Heart:    Regular rate and rhythm, S1 and S2 normal, no murmur, rub   or gallop, no edema    Skin:   Skin color, texture, turgor normal, no rashes or lesions      "

## 2021-07-21 NOTE — PROGRESS NOTES
Assessment/Plan:        1. Upper abdominal pain  More pronounced in the right upper quadrant area,   Consider acute cholecystitis, pancreatitis vs ACS      - Electrocardiogram Perform and Read- normal, no acute changes   - Troponin I- neg ( given the duration of the sx serial troponins felt not needed)   - CK MB- neg  - Lipase- neg  - Comprehensive Metabolic Panel- elevated BG at 309 ( past history of prediabetes) will arrange for a follow up visit to discuss and treat.   - HM1(CBC and Differential)- elevated hgb 18.3 upper limit of normal. Will monitor    Imaging:   - US Abdomen Limited  1.  Probable adenomyomatosis of the gallbladder. No gallstones or sonographic  Gray sign. No biliary dilatation or ascites.     though a benign condition, but may also be seen with cholecystitis.   Recommending a follow up consultation with G. Surgery         40 minutes or greater were spent with the patient today with greater than 50% of the times spent in counseling and management of care.        Subjective:    Patient ID:   Morris Christopher is a 49 y.o.prediabetic male presenting with upper abdominal pain radiating into both shoulders since last Sunday ( 4 days ago).  Sx started in the left upper quadrant, then moved into the right upper quadrant with radiation of pain into the bilateral shoulders associated with mild nausea, no vomiting and lighter colored stools.  He denies having chest pains.  Had a normal heart stress test in 2017.       Review of Systems  Allergy: reviewed  General : negative  ENT : negative  Respiratory : no cough, shortness of breath, or wheezing  Cardiovascular : no chest pain or dyspnea on exertion  Gastrointestinal : See HPI   Genito-Urinary :  no dysuria, trouble voiding, or hematuria, normal colored urine   Dermatological : negative        The following patient's history were reviewed and updated as appropriate:   He  has a past medical history of Prediabetes (11/2/2018).  He does not have any  pertinent problems on file.  He  has a past surgical history that includes Shoulder arthroscopy (2016); Knee arthroscopy; and Tonsillectomy..      Outpatient Encounter Medications as of 4/11/2019   Medication Sig Dispense Refill     sertraline (ZOLOFT) 50 MG tablet Take 1 tablet (50 mg total) by mouth daily. 90 tablet 1     testosterone cypionate (DEPOTESTOTERONE CYPIONATE) 200 mg/mL injection        testosterone cypionate 200 mg/mL Kit Inject 200 mg into the shoulder, thigh, or buttocks every 30 (thirty) days. 1 kit 3     No facility-administered encounter medications on file as of 4/11/2019.          Objective:   /78 (Patient Site: Right Arm, Patient Position: Sitting, Cuff Size: Adult Large)   Pulse 81   Wt (!) 300 lb (136.1 kg)   SpO2 96%   BMI 37.00 kg/m        Physical Exam  General Appearance:    Alert,  no acute distress, well hydrated    Eyes:    PERRL, conjunctiva/corneas clear, non icterus    Throat:   Lips, mucosa, and tongue normal;  gums normal   Neck:   Supple, symmetrical, trachea midline, no adenopathy;        thyroid:  No enlargement/tenderness/nodules; no carotid    bruit or JVD   Lungs:     Clear to auscultation bilaterally, respirations unlabored   Heart:    Regular rate and rhythm, S1 and S2 normal, no murmur, rub   or gallop   Abdomen:      Soft, palpable tenderness to the right upper quadrant  , normal bowel sounds, no rebound or guarding, no masses, no organomegaly   Extremities:   Extremities normal, atraumatic, no cyanosis or edema   Skin:   Skin color, texture, turgor normal, no rashes or lesions

## 2021-07-21 NOTE — TELEPHONE ENCOUNTER
Message was sent on 9/29/20 to contact patient with having his Testosterone rechecked to better guide in therapy before being able to refill this medication.   pls review chart/ lab orders and inform the patient.

## 2021-07-21 NOTE — PROGRESS NOTES
MALE PREVENTATIVE EXAM    Assessment and Plan:       1. Routine general medical examination at a health care facility    - Lipid Profile  The 10-year ASCVD risk score (Evangelina ALTAMIRANO Jr., et al., 2013) is: 9.3%    Values used to calculate the score:      Age: 49 years      Sex: Male      Is Non- : No      Diabetic: Yes      Tobacco smoker: No      Systolic Blood Pressure: 128 mmHg      Is BP treated: No      HDL Cholesterol: 54 mg/dL      Total Cholesterol: 256 mg/dL   Elevated cholesterol     Recommending Statin (lipid lowering medication )  If agreed will send the prescription     - PSA (Prostatic-Specific Antigen), Annual Screen  Normal      2. Type 2 diabetes mellitus without complication, without long-term current use of insulin (H)  - Glycosylated Hemoglobin A1c  - Comprehensive Metabolic Panel  - Microalbumin, Random Urine     Elevated HgA1c in comparison to 7 months ago.   Follow Diabetic diet, Continue current diabetic medical management and keep blood glucose under 130   Recheck lab and follow up in office in  Months      3. Depression  Doing well   Continue current management   - sertraline (ZOLOFT) 100 MG tablet; Take 1 tablet (100 mg total) by mouth daily.  Dispense: 90 tablet; Refill: 3    4. Screen for colon cancer  - Ambulatory referral for Colonoscopy    5. Left-sided chest pain  Plan:   - Echo Stress Exercise; Future    6. Encounter for screening for cardiovascular disorders  - Echo Stress Exercise; Future    7. Hypogonadism male  Recheck labs:   - HM1(CBC and Differential)  - HM1 (CBC with Diff)  - Testosterone, Total     Next follow up:  Return in about 3 months (around 2/14/2020) for or sooner with any issues or concerns.      Immunization Review  Adult Imm Review: Due today, orders placed      I discussed the following with the patient:   Adult Healthy Living: Importance of regular exercise  Healthy nutrition    An additional 30  minutes spent on this visit with more than 50%  "time spent on   reviewing medical record, education and discussion of the above chronic medical problems, med reconciliation, and commenting lab results       Subjective:   Chief Complaint: Morris Christopher is an 49 y.o. male here for          1. Routine general medical examination      2. Type 2 diabetes mellitus   Not sure if the metformin is working well,   BG's are usually over 180      3. Depression   On zoloft, and doing well on it. No additional issues or concerns      4. Hypogonadism male  On testosterone shots and needs his blood work done today  No additional concerns    5. Left-sided chest pain  Nonexertional, intermittent, for some time with achy feel  Last stress test over 5 years ago      Healthy Habits  Are you taking a daily aspirin? No  Do you typically exercising at least 40 min, 3-4 times per week?  NO  Do you usually eat at least 4 servings of fruit and vegetables a day, include whole grains and fiber and avoid regularly eating high fat foods? NO  Have you had an eye exam in the past two years? NO  Do you see a dentist twice per year? NO  Do you have any concerns regarding sleep? YES, hard to sleep during the day     Safety Screen  If you own firearms, are they secured in a locked gun cabinet or with trigger locks? Yes  No data recorded    Review of Systems:  Please see above.  The rest of the review of systems are negative for all systems.     Cancer Screening       Status Date      COLONOSCOPY Overdue 3/24/2018               History     Reviewed By Date/Time Sections Reviewed    Santiago Mariano MD 11/14/2019  8:27 AM Medical, Surgical, Family    Marlon Laws CMA 11/14/2019  8:13 AM Tobacco            Objective:   Vital Signs:   Visit Vitals  /88 (Patient Site: Right Arm, Patient Position: Sitting, Cuff Size: Adult Large)   Pulse 78   Ht 6' 3.5\" (1.918 m)   Wt (!) 317 lb (143.8 kg)   SpO2 94%   BMI 39.10 kg/m           PHYSICAL EXAM  General Appearance:    Alert, cooperative, " no distress, appears stated age   Head:    Normocephalic, without obvious abnormality, atraumatic   Eyes:    PERRL, conjunctiva/corneas clear, EOM's intact, fundi     benign, both eyes        Ears:    Normal TM's and external ear canals, both ears   Nose:   Nares normal, septum midline, mucosa normal, no drainage    or sinus tenderness   Throat:   Lips, mucosa, and tongue normal;  gums normal   Neck:   Supple, symmetrical, trachea midline, no adenopathy;        thyroid:  No enlargement/tenderness/nodules; no carotid    bruit or JVD   Back:     Symmetric, no curvature, ROM normal, no CVA tenderness   Lungs:     Clear to auscultation bilaterally, respirations unlabored   Chest wall:    No tenderness or deformity   Heart:    Regular rate and rhythm, S1 and S2 normal, no murmur, rub   or gallop   Abdomen:     Soft, non-tender, bowel sounds active all four quadrants,     no masses, no organomegaly   Genitalia:    no penile lesions or discharge, no testicular masses or tenderness, no hernias   Rectal:    negative without mass, lesions or tenderness, PROSTATE EXAM: smooth and symmetric without nodules or tenderness   Extremities:   Extremities normal, atraumatic, no cyanosis or edema   Pulses:   2+ and symmetric all extremities   Skin:   Skin color, texture, turgor normal, no rashes or lesions   Lymph nodes:   Cervical, supraclavicular, and axillary nodes normal   Neurologic:   CNII-XII intact. Normal strength, sensation and reflexes       throughout                   Additional Screenings Completed Today:

## 2021-07-21 NOTE — TELEPHONE ENCOUNTER
Central PA team  [phone]  Pool: [facility] PA MED ([id number])          PA has been initiated.       PA form completed on [employer] web Portal       Your Tracking Number is [id number]     Medication:  Testosterone          Response will be received via fax and may take up to 5-10 business days depending on plan

## 2021-07-21 NOTE — PROGRESS NOTES
Assessment/Plan:     1. Type 2 diabetes mellitus without complication, without long-term current use of insulin (H)  Comprehensive Metabolic Panel    Glycosylated Hemoglobin A1c    glipiZIDE (GLUCOTROL) 5 MG tablet    Lipid [place] FASTING    Ambulatory referral to Nutrition Services    Ambulatory referral to Diabetes Education Program (CDE)   2. Screen for colon cancer  Ambulatory referral for Colonoscopy   3. Hypogonadism male  Testosterone, Total   4. Recurrent major depressive disorder, remission status unspecified (H)  sertraline (ZOLOFT) 100 MG tablet   5. Morbid obesity (H)  Ambulatory referral to Nutrition Services   6. Shortness of breath  NM Exercise Stress Test       Diagnoses and all orders for this visit:    Type 2 diabetes mellitus without complication, without long-term current use of insulin (H)  -     Comprehensive Metabolic Panel  -     Glycosylated Hemoglobin A1c  -     glipiZIDE (GLUCOTROL) 5 MG tablet; Take 1 tablet by mouth once daily  Dispense: 90 tablet; Refill: 3  -     Lipid [place] FASTING; Future; Expected date: 11/09/2020  -     Ambulatory referral to Nutrition Services  -     Ambulatory referral to Diabetes Education Program (CDE)  -We will check A1c and comprehensive metabolic panel today.  He has discontinued Metformin due to adverse side effects.  Will start glipizide 5 mg daily.  Further recommendations will be made once lab results are available.  Future order placed for fasting lipids.  Referral placed to nutrition as well as diabetes education.  Discussed he may want to consider a GLP-1 inhibitor as this may help with weight loss.  Continue to work on lifestyle changes to improve control of diabetes.  Recommend follow-up in 3 months.    Screen for colon cancer    -     Ambulatory referral for Colonoscopy    Hypogonadism male  -     Testosterone, Total    Recurrent major depressive disorder, remission status unspecified (H)  -     sertraline (ZOLOFT) 100 MG tablet; Take 1.5  tablets (150 mg total) by mouth daily.  Dispense: 135 tablet; Refill: 3  -Recommend that we try increasing dose of sertraline to 150 mg daily.  Counseled him on use of medication and side effects    Morbid obesity (H)  -     Ambulatory referral to Nutrition Services  -Encourage lifestyle changes    Shortness of breath  -     NM Exercise Stress Test; Future; Expected date: 11/09/2020                 The following are part of a depression follow up plan for the patient:  mental health care management  The following high BMI interventions were performed this visit: encouragement to exercise    Subjective:      Morris Christopher is a 50 y.o. male who is seen today to establish care.  Wife recently seen to establish care and he would like to have see him PCP.  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  Reviewed his health history.  Reviewed current medications and allergies and updated the chart.  He has type 2 diabetes.  This is a fairly recent diagnosis.  He was prescribed Metformin but states that he recently stopped that about 4 days ago.  States that it caused adverse side effects and it did not seem to be helping his blood sugars.  States that his blood sugars have been over 200.  Initially started with lower dose of 500 mg of Metformin.  He did not have as many side effects at the lower dose but states that was not helpful for his blood sugars either.  He really would like to focus on improving his diet and is interested in seeing a nutritionist.  He is not currently on aspirin or statin.  He does not take any medication for blood pressure.  He is on testosterone placement.  Currently doing 200 mg IM per month.  Last injection was a.  He thinks that a dosage increase is needed.  He is on sertraline for depression.  He is not sure if that is helpful.  He has been having some shortness of breath.  PCP ordered stress echo for him in August but he never scheduled that.  Believes he will need a new order.  Would like to get that  scheduled.  Review of systems is assessed and is otherwise negative.  No other concerns today.      Current Outpatient Medications   Medication Sig Dispense Refill     blood glucose test strips Use 1 each As Directed as needed (test daily). Dispense brand per patient's insurance at pharmacy discretion. 100 strip 3     EPINEPHrine (EPIPEN/ADRENACLICK/AUVI-Q) 0.3 mg/0.3 mL injection Inject 0.3 mL (0.3 mg total) as directed as needed for anaphylaxis. Inject into thigh. 2 Pre-filled Pen Syringe 0     sertraline (ZOLOFT) 100 MG tablet Take 1.5 tablets (150 mg total) by mouth daily. 135 tablet 3     glipiZIDE (GLUCOTROL) 5 MG tablet Take 1 tablet by mouth once daily 90 tablet 3     testosterone cypionate 200 mg/mL Kit Inject 200 mg into the shoulder, thigh, or buttocks every 14 (fourteen) days. 2 kit 3     No current facility-administered medications for this visit.        Past Medical History, Family History, and Social History reviewed.  Past Medical History:   Diagnosis Date     Prediabetes 11/2/2018     Past Surgical History:   Procedure Laterality Date     KNEE ARTHROSCOPY      bilateral      SHOULDER ARTHROSCOPY  2016     TONSILLECTOMY       Bee venom protein (honey bee)  Family History   Problem Relation Age of Onset     Lupus Mother      Fibromyalgia Mother      Kidney disease Mother      Hypertension Father      Heart attack Father      Diabetes Father      No Medical Problems Sister      No Medical Problems Brother      Diabetes Maternal Grandmother      Prostate cancer Maternal Grandfather      Colon cancer Paternal Grandmother      Social History     Socioeconomic History     Marital status:      Spouse name: Not on file     Number of children: Not on file     Years of education: Not on file     Highest education level: Not on file   Occupational History     Not on file   Social Needs     Financial resource strain: Not on file     Food insecurity     Worry: Not on file     Inability: Not on file      "Transportation needs     Medical: Not on file     Non-medical: Not on file   Tobacco Use     Smoking status: Never Smoker     Smokeless tobacco: Current User     Tobacco comment: Trying to quit    Substance and Sexual Activity     Alcohol use: No     Drug use: No     Sexual activity: Not on file   Lifestyle     Physical activity     Days per week: Not on file     Minutes per session: Not on file     Stress: Not on file   Relationships     Social connections     Talks on phone: Not on file     Gets together: Not on file     Attends Scientologist service: Not on file     Active member of club or organization: Not on file     Attends meetings of clubs or organizations: Not on file     Relationship status: Not on file     Intimate partner violence     Fear of current or ex partner: Not on file     Emotionally abused: Not on file     Physically abused: Not on file     Forced sexual activity: Not on file   Other Topics Concern     Not on file   Social History Narrative     Not on file         Review of systems is as stated in HPI, and the remainder of the 10 system review is otherwise negative.    Objective:     Vitals:    11/05/20 1047   BP: 134/82   Pulse: 78   SpO2: 98%   Weight: (!) 312 lb (141.5 kg)   Height: 6' 4\" (1.93 m)    Body mass index is 37.98 kg/m .    General appearance: alert, appears stated age and cooperative  Head: Normocephalic, without obvious abnormality, atraumatic  Lungs: clear to auscultation bilaterally  Heart: regular rate and rhythm, S1, S2 normal, no murmur, click, rub or gallop  Neurologic: Grossly normal      This note has been dictated using voice recognition software. Any grammatical or context distortions are unintentional and inherent to the the software.       "

## 2021-07-21 NOTE — TELEPHONE ENCOUNTER
Refill Approved    Rx renewed per Medication Renewal Policy. Medication was last renewed on 11/14/19.    Amber Queen, [facility] Triage/Med Refill 10/28/2020     Requested Prescriptions   Pending Prescriptions Disp Refills     sertraline (ZOLOFT) 100 MG tablet 90 tablet 3     Sig: Take 1 tablet (100 mg total) by mouth daily.       SSRI Refill Protocol  Passed - 10/26/2020  2:25 PM        Passed - PCP or prescribing provider visit in last year     Last office visit with prescriber/PCP: 8/21/2020 Santiago Mariano MD OR same dept: 8/21/2020 Santiago Mariano MD OR same specialty: 8/21/2020 Santiago Mariano MD  Last physical: 11/14/2019 Last MTM visit: Visit date not found   Next visit within 3 mo: Visit date not found  Next physical within 3 mo: Visit date not found  Prescriber OR PCP: Santiago Mariano MD  Last diagnosis associated with med order: 1. Depression  - sertraline (ZOLOFT) 100 MG tablet; Take 1 tablet (100 mg total) by mouth daily.  Dispense: 90 tablet; Refill: 3    If protocol passes may refill for 12 months if within 3 months of last provider visit (or a total of 15 months).

## 2021-07-21 NOTE — TELEPHONE ENCOUNTER
Last Office Visit  6/12/2020 Santiago Mariano MD    Notes:  1. Hypogonadism male  Low testosterone level  Plan for twice a month injections for 2 months, recheck level then     Additional dosing sent to the preferred pharmacy  - testosterone cypionate 200 mg/mL Kit; Inject 200 mg into the shoulder, thigh, or buttocks every 14 (fourteen) days.  Dispense: 2 kit; Refill: 0     2. Type 2 diabetes mellitus without complication, without long-term current use of insulin (H)  A1c not at goal  Discussed lowering the average daily blood sugar level to 150 or less  Plan to increase the metformin to 1000 mg twice daily  Follow diabetic diet     Start:  - metFORMIN (GLUCOPHAGE) 1000 MG tablet; Take 1 tablet (1,000 mg total) by mouth 2 (two) times a day with meals.  Dispense: 60 tablet; Refill: 0    Last Filled:  testosterone cypionate 200 mg/mL Kit  2 kit  0  6/12/2020   No    Sig - Route: Inject 200 mg into the shoulder, thigh, or buttocks every 14 (fourteen) days. - Intramuscular    Sent to pharmacy as: testosterone cypionate 200 mg/mL intramuscular kit    Notes to Pharmacy: This is in addition to the testosterone already on file , patient will be injecting twice monthly for 2 months Pls dispense with applicable syringes/ needles    E-Prescribing Status: Receipt confirmed by pharmacy (6/12/2020  1:23 PM CDT)          Lab Results   Component Value Date    CREAUR 200.7 11/14/2019         Next OV:  Visit date not found      Encounter-Level CSA Scan Date:    There are no encounter-level csa scan date.          reviewed and results are as follows:    Not a delegate for PCP     Medication teed up for provider signature - please adjust as appropriate.

## 2021-07-21 NOTE — TELEPHONE ENCOUNTER
Attempted to call and let patient know order was placed and lab has enough from the draw today to add this on. Will call with results

## 2021-07-23 ENCOUNTER — TELEPHONE (OUTPATIENT)
Dept: FAMILY MEDICINE | Facility: CLINIC | Age: 51
End: 2021-07-23

## 2021-07-23 ASSESSMENT — ANXIETY QUESTIONNAIRES
GAD7 TOTAL SCORE: 4
GAD7 TOTAL SCORE: 5

## 2021-07-23 NOTE — TELEPHONE ENCOUNTER
Unable to reach patient, left VM letting him know that stress test is negative, if he has any questions encouraged him to reach out to the clinic.

## 2021-07-23 NOTE — TELEPHONE ENCOUNTER
----- Message from Blanca Silver sent at 7/23/2021 12:28 PM CDT -----  Please let patient know that his stress test is negative

## 2021-09-08 ENCOUNTER — TELEPHONE (OUTPATIENT)
Dept: FAMILY MEDICINE | Facility: CLINIC | Age: 51
End: 2021-09-08

## 2021-09-08 RX ORDER — TESTOSTERONE CYPIONATE 200 MG/ML
200 INJECTION, SOLUTION INTRAMUSCULAR
COMMUNITY
Start: 2021-05-18 | End: 2022-01-05

## 2021-09-08 NOTE — TELEPHONE ENCOUNTER
PA Initiation    Medication: testosterone cypionate (DEPOTESTOSTERONE) 200 MG/ML injection  Insurance Company: [employer] - Phone [phone] Fax [phone]  Pharmacy Filling the Rx: [facility] [place], [place] - [address] [place] [address]  Filling Pharmacy Phone: [phone]  Filling Pharmacy Fax:    Start Date: 9/8/2021    Central Prior Authorization Team   Phone: [phone]

## 2021-09-08 NOTE — TELEPHONE ENCOUNTER
PA Initiation    Medication: testosterone cypionate (DEPOTESTOSTERONE) 200 MG/ML injection  Insurance Company:  Medicaid  Pharmacy Filling the Rx:   [facility]  Filling Pharmacy Phone:  [phone]  Filling Pharmacy Fax:  [phone]  Start Date:  09/07/2021

## 2021-09-09 NOTE — TELEPHONE ENCOUNTER
Prior Authorization Approval    Authorization Effective Date: 8/9/2021  Authorization Expiration Date: 9/7/2026  Medication: testosterone cypionate (DEPOTESTOSTERONE) 200 MG/ML injection  Approved Dose/Quantity:   Reference #: [id number]  Insurance Company: [employer] - Phone [phone] Fax [phone]  Which Pharmacy is filling the prescription (Not needed for infusion/clinic administered): [facility] [place], [place] - [address] [place] [address]  Pharmacy Notified: Yes **Instructed pharmacy to notify patient when script is ready to /ship.**  Patient Notified: Yes

## 2021-11-28 DIAGNOSIS — F33.9 RECURRENT MAJOR DEPRESSIVE DISORDER, REMISSION STATUS UNSPECIFIED (H): Primary | ICD-10-CM

## 2021-11-30 RX ORDER — SERTRALINE HYDROCHLORIDE 100 MG/1
TABLET, FILM COATED ORAL
Qty: 135 TABLET | Refills: 1 | Status: SHIPPED | OUTPATIENT
Start: 2021-11-30 | End: 2022-01-05

## 2021-11-30 NOTE — TELEPHONE ENCOUNTER
"  Disp Refills Start End REESE    sertraline (ZOLOFT) 100 MG tablet 135 tablet 3 11/5/2020  No   Sig - Route: Take 1.5 tablets (150 mg total) by mouth daily. - Oral   Sent to pharmacy as: sertraline 100 mg tablet (ZOLOFT)   E-Prescribing Status: Receipt confirmed by pharmacy (11/5/2020 11:18 AM CST)       Last office visit provider:  3/5/21     Requested Prescriptions   Pending Prescriptions Disp Refills     sertraline (ZOLOFT) 100 MG tablet [Pharmacy Med Name: SERTRALINE HCL 100MG TABS] 135 tablet 3     Sig: TAKE ONE AND ONE-HALF TABLETS BY MOUTH EVERY DAY       SSRIs Protocol Failed - 11/28/2021  9:31 AM        Failed - Medication is active on med list        Passed - Recent (12 mo) or future (30 days) visit within the authorizing provider's specialty     Patient has had an office visit with the authorizing provider or a provider within the authorizing providers department within the previous 12 mos or has a future within next 30 days. See \"Patient Info\" tab in inbasket, or \"Choose Columns\" in Meds & Orders section of the refill encounter.              Passed - Patient is age 18 or older             Arun Marcos RN 11/30/21 7:40 AM  "

## 2022-01-05 ENCOUNTER — OFFICE VISIT (OUTPATIENT)
Dept: FAMILY MEDICINE | Facility: CLINIC | Age: 52
End: 2022-01-05
Payer: COMMERCIAL

## 2022-01-05 ENCOUNTER — ANCILLARY PROCEDURE (OUTPATIENT)
Dept: GENERAL RADIOLOGY | Facility: CLINIC | Age: 52
End: 2022-01-05
Attending: FAMILY MEDICINE
Payer: COMMERCIAL

## 2022-01-05 VITALS
DIASTOLIC BLOOD PRESSURE: 80 MMHG | SYSTOLIC BLOOD PRESSURE: 126 MMHG | WEIGHT: 306.6 LBS | OXYGEN SATURATION: 97 % | BODY MASS INDEX: 37.57 KG/M2 | HEART RATE: 75 BPM

## 2022-01-05 DIAGNOSIS — E11.42 DIABETIC POLYNEUROPATHY ASSOCIATED WITH TYPE 2 DIABETES MELLITUS (H): ICD-10-CM

## 2022-01-05 DIAGNOSIS — E66.01 MORBID OBESITY (H): ICD-10-CM

## 2022-01-05 DIAGNOSIS — E29.1 HYPOGONADISM MALE: ICD-10-CM

## 2022-01-05 DIAGNOSIS — E11.9 TYPE 2 DIABETES MELLITUS WITHOUT COMPLICATION, WITHOUT LONG-TERM CURRENT USE OF INSULIN (H): ICD-10-CM

## 2022-01-05 DIAGNOSIS — M79.672 PAIN OF LEFT HEEL: ICD-10-CM

## 2022-01-05 DIAGNOSIS — M79.672 PAIN OF LEFT HEEL: Primary | ICD-10-CM

## 2022-01-05 LAB
ALBUMIN SERPL-MCNC: 4.2 G/DL (ref 3.5–5)
ALP SERPL-CCNC: 144 U/L (ref 45–120)
ALT SERPL W P-5'-P-CCNC: 27 U/L (ref 0–45)
ANION GAP SERPL CALCULATED.3IONS-SCNC: 8 MMOL/L (ref 5–18)
AST SERPL W P-5'-P-CCNC: 15 U/L (ref 0–40)
BILIRUB SERPL-MCNC: 0.6 MG/DL (ref 0–1)
BUN SERPL-MCNC: 15 MG/DL (ref 8–22)
CALCIUM SERPL-MCNC: 9.6 MG/DL (ref 8.5–10.5)
CHLORIDE BLD-SCNC: 100 MMOL/L (ref 98–107)
CHOLEST SERPL-MCNC: 193 MG/DL
CO2 SERPL-SCNC: 28 MMOL/L (ref 22–31)
CREAT SERPL-MCNC: 1.17 MG/DL (ref 0.7–1.3)
ERYTHROCYTE [DISTWIDTH] IN BLOOD BY AUTOMATED COUNT: 11.9 % (ref 10–15)
FASTING STATUS PATIENT QL REPORTED: YES
GFR SERPL CREATININE-BSD FRML MDRD: 75 ML/MIN/1.73M2
GLUCOSE BLD-MCNC: 268 MG/DL (ref 70–125)
HBA1C MFR BLD: 8.5 % (ref 0–5.6)
HCT VFR BLD AUTO: 45.9 % (ref 40–53)
HDLC SERPL-MCNC: 61 MG/DL
HGB BLD-MCNC: 16.6 G/DL (ref 13.3–17.7)
LDLC SERPL CALC-MCNC: 115 MG/DL
MCH RBC QN AUTO: 31.3 PG (ref 26.5–33)
MCHC RBC AUTO-ENTMCNC: 36.2 G/DL (ref 31.5–36.5)
MCV RBC AUTO: 86 FL (ref 78–100)
PLATELET # BLD AUTO: 260 10E3/UL (ref 150–450)
POTASSIUM BLD-SCNC: 5.2 MMOL/L (ref 3.5–5)
PROT SERPL-MCNC: 7.4 G/DL (ref 6–8)
RBC # BLD AUTO: 5.31 10E6/UL (ref 4.4–5.9)
SODIUM SERPL-SCNC: 136 MMOL/L (ref 136–145)
TRIGL SERPL-MCNC: 87 MG/DL
WBC # BLD AUTO: 7.3 10E3/UL (ref 4–11)

## 2022-01-05 PROCEDURE — 85027 COMPLETE CBC AUTOMATED: CPT | Performed by: FAMILY MEDICINE

## 2022-01-05 PROCEDURE — 83036 HEMOGLOBIN GLYCOSYLATED A1C: CPT | Performed by: FAMILY MEDICINE

## 2022-01-05 PROCEDURE — 80061 LIPID PANEL: CPT | Performed by: FAMILY MEDICINE

## 2022-01-05 PROCEDURE — 99214 OFFICE O/P EST MOD 30 MIN: CPT | Performed by: FAMILY MEDICINE

## 2022-01-05 PROCEDURE — 73650 X-RAY EXAM OF HEEL: CPT | Mod: TC | Performed by: RADIOLOGY

## 2022-01-05 PROCEDURE — 80053 COMPREHEN METABOLIC PANEL: CPT | Performed by: FAMILY MEDICINE

## 2022-01-05 PROCEDURE — 36415 COLL VENOUS BLD VENIPUNCTURE: CPT | Performed by: FAMILY MEDICINE

## 2022-01-05 RX ORDER — LANCETS 28 GAUGE
EACH MISCELLANEOUS
Qty: 300 EACH | Refills: 3 | Status: SHIPPED | OUTPATIENT
Start: 2022-01-05 | End: 2023-11-07

## 2022-01-05 RX ORDER — BLOOD-GLUCOSE METER
KIT MISCELLANEOUS
Qty: 1 KIT | Refills: 0 | Status: SHIPPED | OUTPATIENT
Start: 2022-01-05 | End: 2022-01-13

## 2022-01-05 RX ORDER — DULOXETIN HYDROCHLORIDE 60 MG/1
60 CAPSULE, DELAYED RELEASE ORAL DAILY
Qty: 90 CAPSULE | Refills: 3 | Status: SHIPPED | OUTPATIENT
Start: 2022-01-05 | End: 2023-02-18

## 2022-01-05 RX ORDER — TESTOSTERONE CYPIONATE 200 MG/ML
200 INJECTION, SOLUTION INTRAMUSCULAR
Status: CANCELLED | OUTPATIENT
Start: 2022-01-05

## 2022-01-05 RX ORDER — GLUCOSAMINE HCL/CHONDROITIN SU 500-400 MG
CAPSULE ORAL
Qty: 300 EACH | Refills: 3 | Status: SHIPPED | OUTPATIENT
Start: 2022-01-05 | End: 2024-01-17

## 2022-01-05 RX ORDER — TESTOSTERONE CYPIONATE 200 MG/ML
180 INJECTION, SOLUTION INTRAMUSCULAR
Qty: 2 ML | Refills: 5 | Status: SHIPPED | OUTPATIENT
Start: 2022-01-05 | End: 2022-08-04

## 2022-01-05 RX ORDER — BLOOD-GLUCOSE CONTROL, NORMAL
EACH MISCELLANEOUS
Qty: 3 EACH | Refills: 3 | Status: SHIPPED | OUTPATIENT
Start: 2022-01-05 | End: 2022-01-13

## 2022-01-05 RX ORDER — GLIPIZIDE 10 MG/1
10 TABLET ORAL
Qty: 180 TABLET | Refills: 3 | Status: SHIPPED | OUTPATIENT
Start: 2022-01-05 | End: 2023-09-20

## 2022-01-05 RX ORDER — BLOOD-GLUCOSE METER
KIT MISCELLANEOUS
Qty: 300 STRIP | Refills: 3 | Status: SHIPPED | OUTPATIENT
Start: 2022-01-05 | End: 2022-01-13

## 2022-01-05 ASSESSMENT — PATIENT HEALTH QUESTIONNAIRE - PHQ9
SUM OF ALL RESPONSES TO PHQ QUESTIONS 1-9: 14
10. IF YOU CHECKED OFF ANY PROBLEMS, HOW DIFFICULT HAVE THESE PROBLEMS MADE IT FOR YOU TO DO YOUR WORK, TAKE CARE OF THINGS AT HOME, OR GET ALONG WITH OTHER PEOPLE: VERY DIFFICULT
SUM OF ALL RESPONSES TO PHQ QUESTIONS 1-9: 14

## 2022-01-05 NOTE — PROGRESS NOTES
Assessment & Plan     Pain of left heel  X-ray shows small heel spur.  Recommend podiatry consult.  Referral placed  - XR Calcaneus Left G/E 2 Views  - Orthopedic  Referral    Type 2 diabetes mellitus without complication, without long-term current use of insulin (H)  Encouraged healthy lifestyle changes including regular exercise, diabetic diet and weight loss.  Will increase glipizide to 10 mg twice daily.  Discussed he may benefit from GLP-1 receptor agonist as this can help with weight loss.  He did express some interest in that so we will place referral to [facility] pharmacist.  Check lipids, A1c and comprehensive metabolic panel today.  He needs a new meter and testing supplies.  Prescription for this was sent to pharmacy.  - Hemoglobin A1c  - glipiZIDE (GLUCOTROL) 10 MG tablet  Dispense: 180 tablet; Refill: 3  - blood glucose monitoring (FREESTYLE LITE) meter device kit  Dispense: 1 kit; Refill: 0  - blood glucose (FREESTYLE LITE) test strip  Dispense: 300 strip; Refill: 3  - blood glucose calibration (FREESTYLE CONTROL) solution  Dispense: 3 each; Refill: 3  - blood glucose monitoring (FREESTYLE) lancets  Dispense: 300 each; Refill: 3  - alcohol swab prep pads  Dispense: 300 each; Refill: 3  - Lipid panel reflex to direct LDL Fasting  - Comprehensive metabolic panel  - Hemoglobin A1c  - Lipid panel reflex to direct LDL Fasting  - Comprehensive metabolic panel    Diabetic polyneuropathy associated with type 2 diabetes mellitus (H)  Discussed importance of improving control of his diabetes.  Continue with lifestyle changes.  Increase glipizide to 10 mg daily.  Referral placed [facility] pharmacist for consideration of GLP-1 receptor agonist.  He is currently on sertraline.  Discussed that duloxetine can be used to treat neuropathy.  We will have him discontinue sertraline and start duloxetine in its place.  Recommend follow-up again in 3 months  - glipiZIDE (GLUCOTROL) 10 MG tablet  Dispense: 180 tablet; Refill:  "3  - DULoxetine (CYMBALTA) 60 MG capsule  Dispense: 90 capsule; Refill: 3  - CBC with platelets  - CBC with platelets    Morbid obesity (H)  Encouraged weight loss efforts and healthy lifestyle changes.  Referral placed to [facility] pharmacist for GLP-1 receptor agonist start    Hypogonadism male  Recommend endocrinology consult for management of testosterone.  Refill provided on testosterone today and will change dose to 180 mg every 2 weeks.  - Adult Endocrinology Referral  - testosterone cypionate (DEPOTESTOSTERONE) 200 MG/ML injection  Dispense: 2 mL; Refill: 5               BMI:   Estimated body mass index is 37.57 kg/m  as calculated from the following:    Height as of 3/5/21: 1.924 m (6' 3.75\").    Weight as of this encounter: 139.1 kg (306 lb 9.6 oz).   Weight management plan: Discussed healthy diet and exercise guidelines    Depression Screening Follow Up    PHQ 1/5/2022   PHQ-9 Total Score 14   Q9: Thoughts of better off dead/self-harm past 2 weeks Not at all         Follow Up Actions Taken  Crisis resource information provided in After Visit Summary  medication changes made         No follow-ups on file.    Blanca Silver MD  [facility]    Vivi Caceres is a 51 year old who presents for the following health issues     HPI   He comes in today complaining of pain in his left heel as well as neuropathy in both of his feet.  Has had pins-and-needles sensation in all of his toes.  In particular his right fifth toe becomes very painful especially at the end of the day.  Feels like there is a needle driving through it.  He is also having pain in his left heel.  This has been going on for a couple of months.  Worse when he gets up and start walking after he has sat down.  Not able to put any weight on it.  No injury.  It does feel a little bit better when he gets out of bed in the morning.  Cold makes it worse.  Heat helps some.  Has occasionally tried NSAIDs.  He is a diabetic and is " taking glipizide.  Not checking his blood sugars regularly.  States that when he does check them his readings have been in the upper 100s and lower 200 range.  He has noticed some discoloration on his lower legs.  He is wearing BRIDGET stockings.  He is able to feel his DP pulses but states that they are a little bit weaker.  No sores on his feet.  No blisters on his feet.  He is requesting refill of testosterone.  Would like to change the dose to 180 mg every 2 weeks which is what he was previously taking.  Felt that dose worked better for him.  We reviewed and updated his medications.  He does request refill of testosterone.        Review of Systems         Objective    /80 (BP Location: Left arm, Cuff Size: Adult Large)   Pulse 75   Wt 139.1 kg (306 lb 9.6 oz)   SpO2 97%   BMI 37.57 kg/m    Body mass index is 37.57 kg/m .  Physical Exam   GENERAL: healthy, alert and no distress  RESP: lungs clear to auscultation - no rales, rhonchi or wheezes  CV: regular rate and rhythm, normal S1 S2, no S3 or S4, no murmur, click or rub, no peripheral edema and peripheral pulses strong  MS: no gross musculoskeletal defects noted, no edema  NEURO: Normal strength and tone, mentation intact and speech normal  PSYCH: mentation appears normal, affect normal/bright  Diabetic foot exam: normal DP and PT pulses and no trophic changes or ulcerative lesions    Xray - Reviewed and interpreted by me.  X-ray of left heel performed in clinic.  This was personally reviewed.  This shows presence of small left heel spur.            Answers for HPI/ROS submitted by the patient on 1/5/2022  If you checked off any problems, how difficult have these problems made it for you to do your work, take care of things at home, or get along with other people?: Very difficult  PHQ9 TOTAL SCORE: 14

## 2022-01-05 NOTE — PATIENT INSTRUCTIONS
See podiatrist    See endocrinologist     Increase glipizide to 10 mg twice daily    Stop sertraline. Start duloxetine for neuropathy and anxiety/depression    I will set you up with our pharmacist to start trulicity or other similar med (whichever is covered by insurance)

## 2022-01-06 ENCOUNTER — TELEPHONE (OUTPATIENT)
Dept: LAB | Facility: CLINIC | Age: 52
End: 2022-01-06

## 2022-01-06 ASSESSMENT — PATIENT HEALTH QUESTIONNAIRE - PHQ9: SUM OF ALL RESPONSES TO PHQ QUESTIONS 1-9: 14

## 2022-01-10 ENCOUNTER — TELEPHONE (OUTPATIENT)
Dept: FAMILY MEDICINE | Facility: CLINIC | Age: 52
End: 2022-01-10
Payer: COMMERCIAL

## 2022-01-10 NOTE — TELEPHONE ENCOUNTER
Gagan has an appt with Uma on 1/13, but isn't sure if he is supposed to have the medication yet. Please reach out to Gagan to let him know what he is supposed to do, because if there is no meds, he doesn't see a point in having this appt. Call back number is .

## 2022-01-11 ENCOUNTER — OFFICE VISIT (OUTPATIENT)
Dept: PODIATRY | Facility: CLINIC | Age: 52
End: 2022-01-11
Attending: FAMILY MEDICINE
Payer: COMMERCIAL

## 2022-01-11 VITALS — WEIGHT: 308 LBS | OXYGEN SATURATION: 98 % | BODY MASS INDEX: 37.51 KG/M2 | HEART RATE: 107 BPM | HEIGHT: 76 IN

## 2022-01-11 DIAGNOSIS — E78.5 HYPERLIPIDEMIA LDL GOAL <100: ICD-10-CM

## 2022-01-11 DIAGNOSIS — M79.672 PAIN OF LEFT HEEL: ICD-10-CM

## 2022-01-11 DIAGNOSIS — E11.9 TYPE 2 DIABETES MELLITUS WITHOUT COMPLICATION, WITHOUT LONG-TERM CURRENT USE OF INSULIN (H): Primary | ICD-10-CM

## 2022-01-11 DIAGNOSIS — M72.2 PLANTAR FASCIITIS: Primary | ICD-10-CM

## 2022-01-11 PROCEDURE — 99203 OFFICE O/P NEW LOW 30 MIN: CPT | Mod: 25 | Performed by: PODIATRIST

## 2022-01-11 PROCEDURE — 20550 NJX 1 TENDON SHEATH/LIGAMENT: CPT | Mod: LT | Performed by: PODIATRIST

## 2022-01-11 RX ORDER — LIDOCAINE HYDROCHLORIDE 20 MG/ML
1 INJECTION, SOLUTION INFILTRATION; PERINEURAL ONCE
Status: COMPLETED | OUTPATIENT
Start: 2022-01-11 | End: 2022-01-11

## 2022-01-11 RX ORDER — DEXAMETHASONE SODIUM PHOSPHATE 4 MG/ML
4 INJECTION, SOLUTION INTRA-ARTICULAR; INTRALESIONAL; INTRAMUSCULAR; INTRAVENOUS; SOFT TISSUE ONCE
Status: COMPLETED | OUTPATIENT
Start: 2022-01-11 | End: 2022-01-11

## 2022-01-11 RX ORDER — SIMVASTATIN 10 MG
10 TABLET ORAL AT BEDTIME
Qty: 90 TABLET | Refills: 3 | Status: SHIPPED | OUTPATIENT
Start: 2022-01-11 | End: 2022-07-28

## 2022-01-11 RX ADMIN — DEXAMETHASONE SODIUM PHOSPHATE 4 MG: 4 INJECTION, SOLUTION INTRA-ARTICULAR; INTRALESIONAL; INTRAMUSCULAR; INTRAVENOUS; SOFT TISSUE at 15:07

## 2022-01-11 RX ADMIN — LIDOCAINE HYDROCHLORIDE 1 ML: 20 INJECTION, SOLUTION INFILTRATION; PERINEURAL at 15:06

## 2022-01-11 ASSESSMENT — PAIN SCALES - GENERAL: PAINLEVEL: EXTREME PAIN (8)

## 2022-01-11 ASSESSMENT — MIFFLIN-ST. JEOR: SCORE: 2335.64

## 2022-01-11 NOTE — LETTER
1/11/2022         RE: Morris Christopher  [address] [facility] [address]        Dear Colleague,    Thank you for referring your patient, Morris Christopher, to the [facility]. Please see a copy of my visit note below.    FOOT AND ANKLE SURGERY/PODIATRY CONSULT NOTE       ASSESSMENT:   Plantar fasciitis left foot        TREATMENT:  The patient was given a cortisone injection in the left heel today consisting of 1 cc of dexamethasone sodium phosphate and 1 cc of 2% lidocaine plain.  I have also recommended orthotics.  The patient is to return to the clinic if his pain persist at which time I would recommend a second cortisone injection.        HPI: I was asked to see  Morris Christopher today to evaluate and treat left heel pain.  The patient has indicated that he has had this heel pain for 2 months.  He exercises regularly but has had to discontinue because of his heel pain.   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  He stated that this is quite aggravating to his pain.  He has had to miss a week of work because of his pain.  He did have an x-ray taken of his left foot.  He was told he had a small heel spur on the bottom of his left heel. He describes it as an aching type pain on the bottom of his left heel.  He denies any trauma to the left heel.  He has not had any associated redness or swelling.  The pain is more pronounced when he first gets out of bed in the mornings.  The pain is relieved with nonweightbearing.  He denies any other previous treatment. The patient was seen in consultation at the request of  Blanca Berg MD for evaluation and treatment of left heel pain.          Past Medical History:   Diagnosis Date     Arthritis 2004     Depression 2019     Diabetes mellitus (H) 2019     Prediabetes 11/2/2018       Social History     Socioeconomic History     Marital status:      Spouse name: Not on file     Number of children: Not on file     Years of education:  Not on file     Highest education level: Not on file   Occupational History     Not on file   Tobacco Use     Smoking status: Former Smoker     Smokeless tobacco: Current User     Types: Chew   Substance and Sexual Activity     Alcohol use: Not on file     Drug use: Not on file     Sexual activity: Not on file   Other Topics Concern     Not on file   Social History Narrative     Not on file     Social Determinants of Health     Financial Resource Strain: Not on file   Food Insecurity: Not on file   Transportation Needs: Not on file   Physical Activity: Not on file   Stress: Not on file   Social Connections: Not on file   Intimate Partner Violence: Not on file   Housing Stability: Not on file          Allergies   Allergen Reactions     Bee Venom Anaphylaxis          Current Outpatient Medications:      alcohol swab prep pads, Use to swab area of injection/mark as directed., Disp: 300 each, Rfl: 3     blood glucose (FREESTYLE LITE) test strip, Use to test blood sugar 3 times daily., Disp: 300 strip, Rfl: 3     blood glucose calibration (FREESTYLE CONTROL) solution, Use to calibrate blood glucose monitor as directed., Disp: 3 each, Rfl: 3     blood glucose monitoring (FREESTYLE LITE) meter device kit, Use to test blood sugar 3 times daily., Disp: 1 kit, Rfl: 0     blood glucose monitoring (FREESTYLE) lancets, Use to test blood sugar 3 times daily., Disp: 300 each, Rfl: 3     DULoxetine (CYMBALTA) 60 MG capsule, Take 1 capsule (60 mg) by mouth daily, Disp: 90 capsule, Rfl: 3     EPINEPHrine (ANY BX GENERIC EQUIV) 0.3 MG/0.3ML injection 2-pack, 0.3 mg, Disp: , Rfl:      glipiZIDE (GLUCOTROL) 10 MG tablet, Take 1 tablet (10 mg) by mouth 2 times daily (before meals), Disp: 180 tablet, Rfl: 3     sildenafil (VIAGRA) 50 MG tablet, Take 50 mg by mouth, Disp: , Rfl:      simvastatin (ZOCOR) 10 MG tablet, Take 1 tablet (10 mg) by mouth At Bedtime, Disp: 90 tablet, Rfl: 3     testosterone cypionate (DEPOTESTOSTERONE) 200 MG/ML  injection, Inject 0.9 mLs (180 mg) into the muscle every 14 days, Disp: 2 mL, Rfl: 5     Family History   Problem Relation Age of Onset     Lupus Mother      Fibromyalgia Mother      Kidney Disease Mother      Clotting Disorder Mother      Depression Mother      Early Death Mother      Hypertension Father      Coronary Artery Disease Father      Diabetes Father      Arthritis Father      No Known Problems Sister      No Known Problems Brother      Diabetes Maternal Grandmother      Alcoholism Maternal Grandmother      Prostate Cancer Maternal Grandfather      Alcoholism Maternal Grandfather      Cancer Maternal Grandfather      Colon Cancer Paternal Grandmother      Arthritis Paternal Grandmother      Cancer Paternal Grandmother      Hypertension Paternal Grandmother      Substance Abuse Maternal Aunt         Social History     Socioeconomic History     Marital status:      Spouse name: Not on file     Number of children: Not on file     Years of education: Not on file     Highest education level: Not on file   Occupational History     Not on file   Tobacco Use     Smoking status: Former Smoker     Smokeless tobacco: Current User     Types: Chew   Substance and Sexual Activity     Alcohol use: Not on file     Drug use: Not on file     Sexual activity: Not on file   Other Topics Concern     Not on file   Social History Narrative     Not on file     Social Determinants of Health     Financial Resource Strain: Not on file   Food Insecurity: Not on file   Transportation Needs: Not on file   Physical Activity: Not on file   Stress: Not on file   Social Connections: Not on file   Intimate Partner Violence: Not on file   Housing Stability: Not on file        Review of Systems - Patient denies fever, chills, rash, wound, stiffness, limping, numbness, weakness, heart burn, blood in stool, chest pain with activity, calf pain when walking, shortness of breath with activity, chronic cough, easy bleeding/bruising,  "swelling of ankles, excessive thirst, fatigue, depression, anxiety.  Patient admits to left heel pain.      OBJECTIVE:  Appearance: alert, well appearing, and in no distress.    Pulse 107   Ht 1.918 m (6' 3.5\")   Wt 139.7 kg (308 lb)   SpO2 98%   BMI 37.99 kg/m       Body mass index is 37.99 kg/m .     General appearance: Patient is alert and fully cooperative with history & exam.  No sign of distress is noted during the visit.  Psychiatric: Affect is pleasant & appropriate.  Patient appears motivated to improve health.  Respiratory: Breathing is regular & unlabored while sitting.  HEENT: Hearing is intact to spoken word.  Speech is clear.  No gross evidence of visual impairment that would impact ambulation.  Vascular: Dorsalis pedis and posterior tibial pulses are palpable. There is no pedal hair growth bilaterally.  CFT < 3 sec from anterior tibial surface to distal digits bilaterally. There is no appreciable edema noted.  Dermatologic: The medial and lateral borders of both great toenails are severely incurvated.  Turgor and texture are within normal limits. No coloration or temperature changes. No primary or secondary lesions noted.  Neurologic: All epicritic and proprioceptive sensations are grossly intact bilaterally.  Musculoskeletal: All active and passive ankle, subtalar, midtarsal, and 1st MPJ range of motion are grossly intact without pain or crepitus, with the exception of none. Manual muscle strength is within normal limits bilaterally. All dorsiflexors, plantarflexors, invertors, evertors are intact bilaterally. Tenderness present to the plantar medial aspect of the left heel on palpation.  No tenderness to the left foot or ankle with range of motion. Calf is soft/non-tender without warmth/induration      Imaging:       No images are attached to the encounter or orders placed in the encounter.     XR Calcaneus Left G/E 2 Views    Result Date: 1/5/2022  EXAM: XR CALCANEUS LEFT G/E 2 VIEWS LOCATION: " [facility] DATE/TIME: 1/5/2022 1:58 PM INDICATION: left heel pain COMPARISON: None.     IMPRESSION: Small plantar calcaneal spur. No fractures.     XR Calcaneus Left G/E 2 Views    Result Date: 1/5/2022  EXAM: XR CALCANEUS LEFT G/E 2 VIEWS LOCATION: [facility] DATE/TIME: 1/5/2022 1:58 PM INDICATION: left heel pain COMPARISON: None.     IMPRESSION: Small plantar calcaneal spur. No fractures.           Paul Nuñez DPM  [facility] Foot & Ankle Surgery/Podiatry         Again, thank you for allowing me to participate in the care of your patient.        Sincerely,        Paul Marie DPM

## 2022-01-11 NOTE — PROGRESS NOTES
FOOT AND ANKLE SURGERY/PODIATRY CONSULT NOTE       ASSESSMENT:   Plantar fasciitis left foot        TREATMENT:  The patient was given a cortisone injection in the left heel today consisting of 1 cc of dexamethasone sodium phosphate and 1 cc of 2% lidocaine plain.  I have also recommended orthotics.  The patient is to return to the clinic if his pain persist at which time I would recommend a second cortisone injection.        HPI: I was asked to see  Morris Christopher today to evaluate and treat left heel pain.  The patient has indicated that he has had this heel pain for 2 months.  He exercises regularly but has had to discontinue because of his heel pain.   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  He stated that this is quite aggravating to his pain.  He has had to miss a week of work because of his pain.  He did have an x-ray taken of his left foot.  He was told he had a small heel spur on the bottom of his left heel. He describes it as an aching type pain on the bottom of his left heel.  He denies any trauma to the left heel.  He has not had any associated redness or swelling.  The pain is more pronounced when he first gets out of bed in the mornings.  The pain is relieved with nonweightbearing.  He denies any other previous treatment. The patient was seen in consultation at the request of  Blanca Berg MD for evaluation and treatment of left heel pain.          Past Medical History:   Diagnosis Date     Arthritis 2004     Depression 2019     Diabetes mellitus (H) 2019     Prediabetes 11/2/2018       Social History     Socioeconomic History     Marital status:      Spouse name: Not on file     Number of children: Not on file     Years of education: Not on file     Highest education level: Not on file   Occupational History     Not on file   Tobacco Use     Smoking status: Former Smoker     Smokeless tobacco: Current User     Types: Chew   Substance and Sexual Activity     Alcohol use: Not on file      Drug use: Not on file     Sexual activity: Not on file   Other Topics Concern     Not on file   Social History Narrative     Not on file     Social Determinants of Health     Financial Resource Strain: Not on file   Food Insecurity: Not on file   Transportation Needs: Not on file   Physical Activity: Not on file   Stress: Not on file   Social Connections: Not on file   Intimate Partner Violence: Not on file   Housing Stability: Not on file          Allergies   Allergen Reactions     Bee Venom Anaphylaxis          Current Outpatient Medications:      alcohol swab prep pads, Use to swab area of injection/mark as directed., Disp: 300 each, Rfl: 3     blood glucose (FREESTYLE LITE) test strip, Use to test blood sugar 3 times daily., Disp: 300 strip, Rfl: 3     blood glucose calibration (FREESTYLE CONTROL) solution, Use to calibrate blood glucose monitor as directed., Disp: 3 each, Rfl: 3     blood glucose monitoring (FREESTYLE LITE) meter device kit, Use to test blood sugar 3 times daily., Disp: 1 kit, Rfl: 0     blood glucose monitoring (FREESTYLE) lancets, Use to test blood sugar 3 times daily., Disp: 300 each, Rfl: 3     DULoxetine (CYMBALTA) 60 MG capsule, Take 1 capsule (60 mg) by mouth daily, Disp: 90 capsule, Rfl: 3     EPINEPHrine (ANY BX GENERIC EQUIV) 0.3 MG/0.3ML injection 2-pack, 0.3 mg, Disp: , Rfl:      glipiZIDE (GLUCOTROL) 10 MG tablet, Take 1 tablet (10 mg) by mouth 2 times daily (before meals), Disp: 180 tablet, Rfl: 3     sildenafil (VIAGRA) 50 MG tablet, Take 50 mg by mouth, Disp: , Rfl:      simvastatin (ZOCOR) 10 MG tablet, Take 1 tablet (10 mg) by mouth At Bedtime, Disp: 90 tablet, Rfl: 3     testosterone cypionate (DEPOTESTOSTERONE) 200 MG/ML injection, Inject 0.9 mLs (180 mg) into the muscle every 14 days, Disp: 2 mL, Rfl: 5     Family History   Problem Relation Age of Onset     Lupus Mother      Fibromyalgia Mother      Kidney Disease Mother      Clotting Disorder Mother      Depression  "Mother      Early Death Mother      Hypertension Father      Coronary Artery Disease Father      Diabetes Father      Arthritis Father      No Known Problems Sister      No Known Problems Brother      Diabetes Maternal Grandmother      Alcoholism Maternal Grandmother      Prostate Cancer Maternal Grandfather      Alcoholism Maternal Grandfather      Cancer Maternal Grandfather      Colon Cancer Paternal Grandmother      Arthritis Paternal Grandmother      Cancer Paternal Grandmother      Hypertension Paternal Grandmother      Substance Abuse Maternal Aunt         Social History     Socioeconomic History     Marital status:      Spouse name: Not on file     Number of children: Not on file     Years of education: Not on file     Highest education level: Not on file   Occupational History     Not on file   Tobacco Use     Smoking status: Former Smoker     Smokeless tobacco: Current User     Types: Chew   Substance and Sexual Activity     Alcohol use: Not on file     Drug use: Not on file     Sexual activity: Not on file   Other Topics Concern     Not on file   Social History Narrative     Not on file     Social Determinants of Health     Financial Resource Strain: Not on file   Food Insecurity: Not on file   Transportation Needs: Not on file   Physical Activity: Not on file   Stress: Not on file   Social Connections: Not on file   Intimate Partner Violence: Not on file   Housing Stability: Not on file        Review of Systems - Patient denies fever, chills, rash, wound, stiffness, limping, numbness, weakness, heart burn, blood in stool, chest pain with activity, calf pain when walking, shortness of breath with activity, chronic cough, easy bleeding/bruising, swelling of ankles, excessive thirst, fatigue, depression, anxiety.  Patient admits to left heel pain.      OBJECTIVE:  Appearance: alert, well appearing, and in no distress.    Pulse 107   Ht 1.918 m (6' 3.5\")   Wt 139.7 kg (308 lb)   SpO2 98%   BMI " 37.99 kg/m       Body mass index is 37.99 kg/m .     General appearance: Patient is alert and fully cooperative with history & exam.  No sign of distress is noted during the visit.  Psychiatric: Affect is pleasant & appropriate.  Patient appears motivated to improve health.  Respiratory: Breathing is regular & unlabored while sitting.  HEENT: Hearing is intact to spoken word.  Speech is clear.  No gross evidence of visual impairment that would impact ambulation.  Vascular: Dorsalis pedis and posterior tibial pulses are palpable. There is no pedal hair growth bilaterally.  CFT < 3 sec from anterior tibial surface to distal digits bilaterally. There is no appreciable edema noted.  Dermatologic: The medial and lateral borders of both great toenails are severely incurvated.  Turgor and texture are within normal limits. No coloration or temperature changes. No primary or secondary lesions noted.  Neurologic: All epicritic and proprioceptive sensations are grossly intact bilaterally.  Musculoskeletal: All active and passive ankle, subtalar, midtarsal, and 1st MPJ range of motion are grossly intact without pain or crepitus, with the exception of none. Manual muscle strength is within normal limits bilaterally. All dorsiflexors, plantarflexors, invertors, evertors are intact bilaterally. Tenderness present to the plantar medial aspect of the left heel on palpation.  No tenderness to the left foot or ankle with range of motion. Calf is soft/non-tender without warmth/induration      Imaging:       No images are attached to the encounter or orders placed in the encounter.     XR Calcaneus Left G/E 2 Views    Result Date: 1/5/2022  EXAM: XR CALCANEUS LEFT G/E 2 VIEWS LOCATION: [facility] DATE/TIME: 1/5/2022 1:58 PM INDICATION: left heel pain COMPARISON: None.     IMPRESSION: Small plantar calcaneal spur. No fractures.     XR Calcaneus Left G/E 2 Views    Result Date: 1/5/2022  EXAM: XR CALCANEUS LEFT G/E 2  VIEWS LOCATION: [facility] DATE/TIME: 1/5/2022 1:58 PM INDICATION: left heel pain COMPARISON: None.     IMPRESSION: Small plantar calcaneal spur. No fractures.           Paul Nuñez DPM  [facility] Foot & Ankle Surgery/Podiatry

## 2022-01-12 NOTE — PROGRESS NOTES
Medication Therapy Management (MTM) Encounter    ASSESSMENT:                            Type 2 Diabetes: Last A1c not at goal.  I do agree with switching him to a GLP-1 agonist, which will be helpful for his blood sugars, weight loss, and work schedule.  We discussed a once weekly injection which would be ideal for his schedule.  We will start with Trulicity, he is comfortable with administering injections.  If not covered, will try Trulicity.  I will also get him a voucher to bring the price down if his co-pay is high.  We reviewed the side effects, warnings, storage, dosing, glucose lowering expectations with him today.  He is very interested in starting.  If effective, we will start to titrate down on the glipizide and eventually stop.  I will send him a new glucometer and test strips so that we can monitor for blood sugar lowering.  Reviewed that his blood sugars may be high right now since he recently had a steroid injection.  We discussed the importance of a statin and aspirin for primary prevention, but he declines today.  We will discuss again at follow-up.    Plantar fasciitis left foot: Patient to continue to monitor for improvement in symptoms.    Neuropathy: Patient tolerated the transition well, will continue to monitor for improvement in neuropathy with duloxetine and as the blood sugars decrease.    Depression: Patient tolerated the transition well.  Reviewed that the first week he may have more side effects, but we will continue to monitor his tolerance of duloxetine with time.    Hypogonadism/ED: Patient will be following with endocrinology soon.      PLAN:                            1.  Start Trulicity 0.75 mg weekly.  2.  New glucometer and test trips sent today.  Restart checking blood sugars.  3.  Patient declines aspirin and statin today    Follow-up: 2 week MyChart follow up     SUBJECTIVE/OBJECTIVE:                          Morris Christopher is a 51 year old male called for an initial visit. He  was referred to me from Dr. Silver.      Reason for visit: Start GLP1 agonist.  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  He takes his medicines in the morning, which is also his bedtime.  He works at [employer].     Type 2 Diabetes: Currently taking glipizide 10 mg twice daily (before meals). Glipizide increased by PCP on 1/5/22. Was on metformin in the past but stopped due to side effects.   Tests BG 0 times daily.  He was on a freestyle monitor, but with his insurance changing they no longer cover those test strips.  He needs a new glucometer and strips.  He does have lancets at home.  He last checked his blood sugars 3 days ago at work and it was 210.  That was when he was on the lower dose of glipizide.  Last A1c checked 1/5/22 = 8.5%.   Hypoglycemia none  Hyperglycemia symptoms: none, maybe polyuria.   Microalbumin checked 3/5/21  Last GFR 75 ml/min on 1/5/22  Started on simvastatin 10 mg daily on 1/11/22, but he declines at this time. Last lipids checked 1/5/22.   Is not taking aspirin 81 mg for primary prevention.   He is interested in weight loss.   No thyroid cancer or pancreatitis history.  Wt Readings from Last 2 Encounters:   01/11/22 308 lb (139.7 kg)   01/05/22 306 lb 9.6 oz (139.1 kg)   The 10-year ASCVD risk score (Evangelina ADARSH Jr., et al., 2013) is: 6.8%    Values used to calculate the score:      Age: 51 years      Sex: Male      Is Non- : No      Diabetic: Yes      Tobacco smoker: No      Systolic Blood Pressure: 126 mmHg      Is BP treated: No      HDL Cholesterol: 61 mg/dL      Total Cholesterol: 193 mg/dL      Plantar fasciitis left foot: Met with podiatry on 1/11/21 and underwent a steroid injection. Not sure if his blood sugar spiked due to the steroid shot.  Reports it feels a little better, but too soon to tell.    Neuropathy: Now taking duloxetine 60 mg daily. Changed from  sertraline on 1/5/22.  Has not noticed any change with neuropathy.    Depression: Sertraline 150 mg changed to duloxetine 60 mg on 1/5/22 to help with neuropathy.  He has only been taking the duloxetine for a few days, therefore he has not noticed anything significant.  It does make him a little bit more tired therefore he takes it in the morning before bed.    Hypogonadism/ED: Continues testosterone injection 180 mg every 14 days and sildenafil as needed. He was referred to niko to further manage. Appointment with endo on 2/22/22.  He rarely uses sildenafil.  Last testosterone level checked 6/21/2021    Today's Vitals: There were no vitals taken for this visit.     Allergies/ADRs: Reviewed in chart  Past Medical History: Reviewed in chart  Tobacco: He reports that he has quit smoking. His smokeless tobacco use includes chew.  Alcohol: reviewed in chart    ----------------      I spent 24 minutes with this patient today. All changes were made via collaborative practice agreement with Blanca Silver MD. A copy of the visit note was provided to the patient's primary care provider.    The patient was sent via [employer] a summary of these recommendations.     Uma Archibald, Pharm.D., [facility]   Medication Therapy Management Pharmacist   [facility]     Telemedicine Visit Details  Type of service:  Telephone visit  Start Time: 1:03 PM  End Time: 1:27 PM  Originating Location (patient location): Home  Distant Location (provider location):  [facility]     Medication Therapy Recommendations  Diabetes mellitus, type 2 (H)    Current Medication: Blood Glucose Monitoring Suppl (ACCU-CHEK GUIDE) w/Device KIT   Rationale: Medication requires monitoring - Needs additional monitoring   Recommendation: Self-Monitoring   Status: Accepted per CPA          Current Medication: dulaglutide (TRULICITY) 0.75 MG/0.5ML pen   Rationale: Synergistic therapy - Needs additional  medication therapy - Indication   Recommendation: Start Medication   Status: Accepted per CPA          Rationale: Preventive therapy - Needs additional medication therapy - Indication   Recommendation: Start Medication - aspirin 81 MG Tbec   Status: Declined per Patient

## 2022-01-13 ENCOUNTER — TELEPHONE (OUTPATIENT)
Dept: FAMILY MEDICINE | Facility: CLINIC | Age: 52
End: 2022-01-13
Payer: COMMERCIAL

## 2022-01-13 ENCOUNTER — VIRTUAL VISIT (OUTPATIENT)
Dept: PHARMACY | Facility: CLINIC | Age: 52
End: 2022-01-13
Attending: FAMILY MEDICINE
Payer: COMMERCIAL

## 2022-01-13 DIAGNOSIS — M72.2 PLANTAR FASCIITIS: ICD-10-CM

## 2022-01-13 DIAGNOSIS — F32.A DEPRESSION, UNSPECIFIED DEPRESSION TYPE: ICD-10-CM

## 2022-01-13 DIAGNOSIS — E11.9 TYPE 2 DIABETES MELLITUS WITHOUT COMPLICATION, WITHOUT LONG-TERM CURRENT USE OF INSULIN (H): Primary | ICD-10-CM

## 2022-01-13 DIAGNOSIS — G62.9 NEUROPATHY: ICD-10-CM

## 2022-01-13 DIAGNOSIS — E29.1 HYPOGONADISM MALE: ICD-10-CM

## 2022-01-13 PROCEDURE — 99607 MTMS BY PHARM ADDL 15 MIN: CPT | Performed by: PHARMACIST

## 2022-01-13 PROCEDURE — 99605 MTMS BY PHARM NP 15 MIN: CPT | Performed by: PHARMACIST

## 2022-01-13 RX ORDER — BLOOD-GLUCOSE METER
1 EACH MISCELLANEOUS ONCE
Qty: 1 KIT | Refills: 0 | Status: SHIPPED | OUTPATIENT
Start: 2022-01-13 | End: 2022-01-13

## 2022-01-13 RX ORDER — DULAGLUTIDE 0.75 MG/.5ML
0.75 INJECTION, SOLUTION SUBCUTANEOUS
Qty: 2 ML | Refills: 2 | Status: SHIPPED | OUTPATIENT
Start: 2022-01-13 | End: 2022-04-13

## 2022-01-13 RX ORDER — BLOOD SUGAR DIAGNOSTIC
STRIP MISCELLANEOUS
Qty: 100 STRIP | Refills: 11 | Status: SHIPPED | OUTPATIENT
Start: 2022-01-13 | End: 2023-11-07

## 2022-01-13 NOTE — TELEPHONE ENCOUNTER
----- Message from Blanca Silver MD sent at 1/11/2022  2:17 PM CST -----  Please call pt with this message:   Cholesterol levels are above goal for diabetics. Would like to see LDL less than 100. I recommend that you start a medication to lower your cholesterol. I will send this to your pharmacy.  I recommend a recheck of cholesterol levels in 3 months.

## 2022-01-13 NOTE — TELEPHONE ENCOUNTER
Notified patient of results, he is not sure he wants to start a statin. He is going to do research.

## 2022-01-17 ENCOUNTER — LAB (OUTPATIENT)
Dept: LAB | Facility: CLINIC | Age: 52
End: 2022-01-17
Payer: COMMERCIAL

## 2022-01-17 DIAGNOSIS — E29.1 HYPOGONADISM MALE: ICD-10-CM

## 2022-01-17 DIAGNOSIS — E29.1 HYPOGONADISM MALE: Primary | ICD-10-CM

## 2022-01-17 PROCEDURE — 84403 ASSAY OF TOTAL TESTOSTERONE: CPT

## 2022-01-17 PROCEDURE — 36415 COLL VENOUS BLD VENIPUNCTURE: CPT

## 2022-01-18 ENCOUNTER — OFFICE VISIT (OUTPATIENT)
Dept: PODIATRY | Facility: CLINIC | Age: 52
End: 2022-01-18
Payer: COMMERCIAL

## 2022-01-18 VITALS — HEART RATE: 84 BPM | RESPIRATION RATE: 18 BRPM | DIASTOLIC BLOOD PRESSURE: 82 MMHG | SYSTOLIC BLOOD PRESSURE: 136 MMHG

## 2022-01-18 DIAGNOSIS — M72.2 PLANTAR FASCIITIS: Primary | ICD-10-CM

## 2022-01-18 PROCEDURE — 20550 NJX 1 TENDON SHEATH/LIGAMENT: CPT | Mod: LT | Performed by: PODIATRIST

## 2022-01-18 RX ORDER — LIDOCAINE HYDROCHLORIDE 20 MG/ML
1 INJECTION, SOLUTION INFILTRATION; PERINEURAL ONCE
Status: COMPLETED | OUTPATIENT
Start: 2022-01-18 | End: 2022-01-18

## 2022-01-18 RX ORDER — DEXAMETHASONE SODIUM PHOSPHATE 4 MG/ML
4 INJECTION, SOLUTION INTRA-ARTICULAR; INTRALESIONAL; INTRAMUSCULAR; INTRAVENOUS; SOFT TISSUE ONCE
Status: COMPLETED | OUTPATIENT
Start: 2022-01-18 | End: 2022-01-18

## 2022-01-18 RX ADMIN — LIDOCAINE HYDROCHLORIDE 1 ML: 20 INJECTION, SOLUTION INFILTRATION; PERINEURAL at 08:57

## 2022-01-18 RX ADMIN — DEXAMETHASONE SODIUM PHOSPHATE 4 MG: 4 INJECTION, SOLUTION INTRA-ARTICULAR; INTRALESIONAL; INTRAMUSCULAR; INTRAVENOUS; SOFT TISSUE at 08:57

## 2022-01-18 ASSESSMENT — PAIN SCALES - GENERAL: PAINLEVEL: EXTREME PAIN (8)

## 2022-01-18 NOTE — PROGRESS NOTES
FOOT AND ANKLE SURGERY/PODIATRY Progress Note          ASSESSMENT:   Plantar fasciitis left foot        TREATMENT:  The patient was given a second cortisone injection in the left heel today consisting of 1 cc of dexamethasone sodium phosphate and 1 cc of 2% lidocaine plain.  He is to return to the clinic as needed if his pain persist at which time I may recommend surgical intervention.  The patient's previous x-rays shows that he has a small heel spur on the left heel.        HPI:Morris Christopher return to the clinic today with continued complaints of left heel pain.  The patient was previously diagnosed with plantar fasciitis.  He was given a cortisone injection during his last visit.  The patient stated he only had temporary relief.  The patient has indicated that he has had this heel pain for 2 months.  He exercises regularly but has had to discontinue because of his heel pain.   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  He stated that this is quite aggravating to his pain.  He has had to miss a week of work because of his pain.  He did have an x-ray taken of his left foot.  He was told he had a small heel spur on the bottom of his left heel. He describes it as an aching type pain on the bottom of his left heel.  He denies any trauma to the left heel.  He has not had any associated redness or swelling.  The pain is more pronounced when he first gets out of bed in the mornings.  The pain is relieved with nonweightbearing.    Past Medical History:   Diagnosis Date     Arthritis 2004     Depression 2019     Diabetes mellitus (H) 2019     Prediabetes 11/2/2018        Past Surgical History:   Procedure Laterality Date     ARTHROSCOPY KNEE      bilateral      ARTHROSCOPY SHOULDER  2016     TONSILLECTOMY         Allergies   Allergen Reactions     Bee Venom Anaphylaxis         Current Outpatient Medications:      alcohol swab prep pads, Use to swab area of injection/mark as directed., Disp: 300 each, Rfl: 3      blood glucose (ACCU-CHEK GUIDE) test strip, Use to test blood sugar 2 times daily or as directed., Disp: 100 strip, Rfl: 11     blood glucose monitoring (FREESTYLE) lancets, Use to test blood sugar 3 times daily., Disp: 300 each, Rfl: 3     dulaglutide (TRULICITY) 0.75 MG/0.5ML pen, Inject 0.75 mg Subcutaneous every 7 days, Disp: 2 mL, Rfl: 2     DULoxetine (CYMBALTA) 60 MG capsule, Take 1 capsule (60 mg) by mouth daily, Disp: 90 capsule, Rfl: 3     EPINEPHrine (ANY BX GENERIC EQUIV) 0.3 MG/0.3ML injection 2-pack, Inject 0.3 mg into the muscle as needed, Disp: , Rfl:      glipiZIDE (GLUCOTROL) 10 MG tablet, Take 1 tablet (10 mg) by mouth 2 times daily (before meals), Disp: 180 tablet, Rfl: 3     sildenafil (VIAGRA) 50 MG tablet, Take 50 mg by mouth as needed, Disp: , Rfl:      simvastatin (ZOCOR) 10 MG tablet, Take 1 tablet (10 mg) by mouth At Bedtime, Disp: 90 tablet, Rfl: 3     testosterone cypionate (DEPOTESTOSTERONE) 200 MG/ML injection, Inject 0.9 mLs (180 mg) into the muscle every 14 days, Disp: 2 mL, Rfl: 5    Family History   Problem Relation Age of Onset     Lupus Mother      Fibromyalgia Mother      Kidney Disease Mother      Clotting Disorder Mother      Depression Mother      Early Death Mother      Hypertension Father      Coronary Artery Disease Father      Diabetes Father      Arthritis Father      No Known Problems Sister      No Known Problems Brother      Diabetes Maternal Grandmother      Alcoholism Maternal Grandmother      Prostate Cancer Maternal Grandfather      Alcoholism Maternal Grandfather      Cancer Maternal Grandfather      Colon Cancer Paternal Grandmother      Arthritis Paternal Grandmother      Cancer Paternal Grandmother      Hypertension Paternal Grandmother      Substance Abuse Maternal Aunt        Social History     Socioeconomic History     Marital status:      Spouse name: Not on file     Number of children: Not on file     Years of education: Not on file     Highest  education level: Not on file   Occupational History     Not on file   Tobacco Use     Smoking status: Former Smoker     Smokeless tobacco: Current User     Types: Chew   Substance and Sexual Activity     Alcohol use: Not on file     Drug use: Not on file     Sexual activity: Not on file   Other Topics Concern     Not on file   Social History Narrative     Not on file     Social Determinants of Health     Financial Resource Strain: Not on file   Food Insecurity: Not on file   Transportation Needs: Not on file   Physical Activity: Not on file   Stress: Not on file   Social Connections: Not on file   Intimate Partner Violence: Not on file   Housing Stability: Not on file       10 point Review of Systems is negative except as mentioned above      Resp 18     BMI= There is no height or weight on file to calculate BMI.    OBJECTIVE:  General appearance: Patient is alert and fully cooperative with history & exam.  No sign of distress is noted during the visit.  Vascular: Dorsalis pedis and posterior tibial pulses are palpable. There is no pedal hair growth bilaterally.  CFT < 3 sec from anterior tibial surface to distal digits bilaterally. There is no appreciable edema noted.  Dermatologic: The medial and lateral borders of both great toenails are severely incurvated.  Turgor and texture are within normal limits. No coloration or temperature changes. No primary or secondary lesions noted.  Neurologic: All epicritic and proprioceptive sensations are grossly intact bilaterally.  Musculoskeletal: All active and passive ankle, subtalar, midtarsal, and 1st MPJ range of motion are grossly intact without pain or crepitus, with the exception of none. Manual muscle strength is within normal limits bilaterally. All dorsiflexors, plantarflexors, invertors, evertors are intact bilaterally. Tenderness present to the plantar medial aspect of the left heel on palpation.  No tenderness to the left foot or ankle with range of motion. Calf  is soft/non-tender without warmth/induration  Imaging:         XR Calcaneus Left G/E 2 Views    Result Date: 1/5/2022  EXAM: XR CALCANEUS LEFT G/E 2 VIEWS LOCATION: [facility] DATE/TIME: 1/5/2022 1:58 PM INDICATION: left heel pain COMPARISON: None.     IMPRESSION: Small plantar calcaneal spur. No fractures.           Paul Marie; EDDIE  [facility] Foot & Ankle Surgery/Podiatry

## 2022-01-18 NOTE — LETTER
[address]  [facility] [address]  [phone]    2022    Re: Morris Christopher  [address] [address]  [phone] (home)     : 1970      To Whom It May Concern:      Morris Christopher was seen in clinic 2022&2022 due to medical illness.  He may return to work on 22 with full duty.        Sincerely,        Paul Marie DPM,

## 2022-01-18 NOTE — LETTER
[address]  [facility] [address]  [phone]    2022    Re: Morris Christopher  [address] [address]  [phone] (home)     : 1970      To Whom It May Concern:      Morris Christopher was seen in clinic 2022&2022 due to medical illness.  He may return to school on 22 with full duty.        Sincerely,        Paul Marie DPM,

## 2022-01-18 NOTE — LETTER
1/18/2022         RE: Morris Christopher  [address] [facility] [address]        Dear Colleague,    Thank you for referring your patient, Morris Christopher, to the [facility]. Please see a copy of my visit note below.    FOOT AND ANKLE SURGERY/PODIATRY Progress Note          ASSESSMENT:   Plantar fasciitis left foot        TREATMENT:  The patient was given a second cortisone injection in the left heel today consisting of 1 cc of dexamethasone sodium phosphate and 1 cc of 2% lidocaine plain.  He is to return to the clinic as needed if his pain persist at which time I may recommend surgical intervention.  The patient's previous x-rays shows that he has a small heel spur on the left heel.        HPI:Morris Christopher return to the clinic today with continued complaints of left heel pain.  The patient was previously diagnosed with plantar fasciitis.  He was given a cortisone injection during his last visit.  The patient stated he only had temporary relief.  The patient has indicated that he has had this heel pain for 2 months.  He exercises regularly but has had to discontinue because of his heel pain.   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  He stated that this is quite aggravating to his pain.  He has had to miss a week of work because of his pain.  He did have an x-ray taken of his left foot.  He was told he had a small heel spur on the bottom of his left heel. He describes it as an aching type pain on the bottom of his left heel.  He denies any trauma to the left heel.  He has not had any associated redness or swelling.  The pain is more pronounced when he first gets out of bed in the mornings.  The pain is relieved with nonweightbearing.    Past Medical History:   Diagnosis Date     Arthritis 2004     Depression 2019     Diabetes mellitus (H) 2019     Prediabetes 11/2/2018        Past Surgical History:   Procedure Laterality Date     ARTHROSCOPY KNEE      bilateral       ARTHROSCOPY SHOULDER  2016     TONSILLECTOMY         Allergies   Allergen Reactions     Bee Venom Anaphylaxis         Current Outpatient Medications:      alcohol swab prep pads, Use to swab area of injection/mark as directed., Disp: 300 each, Rfl: 3     blood glucose (ACCU-CHEK GUIDE) test strip, Use to test blood sugar 2 times daily or as directed., Disp: 100 strip, Rfl: 11     blood glucose monitoring (FREESTYLE) lancets, Use to test blood sugar 3 times daily., Disp: 300 each, Rfl: 3     dulaglutide (TRULICITY) 0.75 MG/0.5ML pen, Inject 0.75 mg Subcutaneous every 7 days, Disp: 2 mL, Rfl: 2     DULoxetine (CYMBALTA) 60 MG capsule, Take 1 capsule (60 mg) by mouth daily, Disp: 90 capsule, Rfl: 3     EPINEPHrine (ANY BX GENERIC EQUIV) 0.3 MG/0.3ML injection 2-pack, Inject 0.3 mg into the muscle as needed, Disp: , Rfl:      glipiZIDE (GLUCOTROL) 10 MG tablet, Take 1 tablet (10 mg) by mouth 2 times daily (before meals), Disp: 180 tablet, Rfl: 3     sildenafil (VIAGRA) 50 MG tablet, Take 50 mg by mouth as needed, Disp: , Rfl:      simvastatin (ZOCOR) 10 MG tablet, Take 1 tablet (10 mg) by mouth At Bedtime, Disp: 90 tablet, Rfl: 3     testosterone cypionate (DEPOTESTOSTERONE) 200 MG/ML injection, Inject 0.9 mLs (180 mg) into the muscle every 14 days, Disp: 2 mL, Rfl: 5    Family History   Problem Relation Age of Onset     Lupus Mother      Fibromyalgia Mother      Kidney Disease Mother      Clotting Disorder Mother      Depression Mother      Early Death Mother      Hypertension Father      Coronary Artery Disease Father      Diabetes Father      Arthritis Father      No Known Problems Sister      No Known Problems Brother      Diabetes Maternal Grandmother      Alcoholism Maternal Grandmother      Prostate Cancer Maternal Grandfather      Alcoholism Maternal Grandfather      Cancer Maternal Grandfather      Colon Cancer Paternal Grandmother      Arthritis Paternal Grandmother      Cancer Paternal Grandmother       Hypertension Paternal Grandmother      Substance Abuse Maternal Aunt        Social History     Socioeconomic History     Marital status:      Spouse name: Not on file     Number of children: Not on file     Years of education: Not on file     Highest education level: Not on file   Occupational History     Not on file   Tobacco Use     Smoking status: Former Smoker     Smokeless tobacco: Current User     Types: Chew   Substance and Sexual Activity     Alcohol use: Not on file     Drug use: Not on file     Sexual activity: Not on file   Other Topics Concern     Not on file   Social History Narrative     Not on file     Social Determinants of Health     Financial Resource Strain: Not on file   Food Insecurity: Not on file   Transportation Needs: Not on file   Physical Activity: Not on file   Stress: Not on file   Social Connections: Not on file   Intimate Partner Violence: Not on file   Housing Stability: Not on file       10 point Review of Systems is negative except as mentioned above      Resp 18     BMI= There is no height or weight on file to calculate BMI.    OBJECTIVE:  General appearance: Patient is alert and fully cooperative with history & exam.  No sign of distress is noted during the visit.  Vascular: Dorsalis pedis and posterior tibial pulses are palpable. There is no pedal hair growth bilaterally.  CFT < 3 sec from anterior tibial surface to distal digits bilaterally. There is no appreciable edema noted.  Dermatologic: The medial and lateral borders of both great toenails are severely incurvated.  Turgor and texture are within normal limits. No coloration or temperature changes. No primary or secondary lesions noted.  Neurologic: All epicritic and proprioceptive sensations are grossly intact bilaterally.  Musculoskeletal: All active and passive ankle, subtalar, midtarsal, and 1st MPJ range of motion are grossly intact without pain or crepitus, with the exception of none. Manual muscle strength  is within normal limits bilaterally. All dorsiflexors, plantarflexors, invertors, evertors are intact bilaterally. Tenderness present to the plantar medial aspect of the left heel on palpation.  No tenderness to the left foot or ankle with range of motion. Calf is soft/non-tender without warmth/induration  Imaging:         XR Calcaneus Left G/E 2 Views    Result Date: 1/5/2022  EXAM: XR CALCANEUS LEFT G/E 2 VIEWS LOCATION: [facility] DATE/TIME: 1/5/2022 1:58 PM INDICATION: left heel pain COMPARISON: None.     IMPRESSION: Small plantar calcaneal spur. No fractures.           Paul Marie; EDDIE  [facility] Foot & Ankle Surgery/Podiatry         Again, thank you for allowing me to participate in the care of your patient.        Sincerely,        Paul Marie DPM

## 2022-01-19 LAB — TESTOST SERPL-MCNC: 490 NG/DL (ref 221–716)

## 2022-02-06 ENCOUNTER — HEALTH MAINTENANCE LETTER (OUTPATIENT)
Age: 52
End: 2022-02-06

## 2022-04-11 ENCOUNTER — MYC MEDICAL ADVICE (OUTPATIENT)
Dept: PHARMACY | Facility: CLINIC | Age: 52
End: 2022-04-11
Payer: COMMERCIAL

## 2022-04-11 DIAGNOSIS — E11.9 TYPE 2 DIABETES MELLITUS WITHOUT COMPLICATION, WITHOUT LONG-TERM CURRENT USE OF INSULIN (H): Primary | ICD-10-CM

## 2022-04-12 DIAGNOSIS — E29.1 HYPOGONADISM MALE: Primary | ICD-10-CM

## 2022-04-12 NOTE — TELEPHONE ENCOUNTER
"Requesting rxs for supplies needed for testosterone injects:    BD Hypodermic Needles 25g 1 1/2\"  BD Luer-viridiana syringer 18g 1 1/2\"      "

## 2022-04-13 ENCOUNTER — LAB (OUTPATIENT)
Dept: LAB | Facility: CLINIC | Age: 52
End: 2022-04-13
Payer: COMMERCIAL

## 2022-04-13 DIAGNOSIS — E11.9 TYPE 2 DIABETES MELLITUS WITHOUT COMPLICATION, WITHOUT LONG-TERM CURRENT USE OF INSULIN (H): Primary | ICD-10-CM

## 2022-04-13 DIAGNOSIS — E11.9 TYPE 2 DIABETES MELLITUS WITHOUT COMPLICATION, WITHOUT LONG-TERM CURRENT USE OF INSULIN (H): ICD-10-CM

## 2022-04-13 LAB
ANION GAP SERPL CALCULATED.3IONS-SCNC: 15 MMOL/L (ref 5–18)
BUN SERPL-MCNC: 16 MG/DL (ref 8–22)
CALCIUM SERPL-MCNC: 9.7 MG/DL (ref 8.5–10.5)
CHLORIDE BLD-SCNC: 102 MMOL/L (ref 98–107)
CO2 SERPL-SCNC: 22 MMOL/L (ref 22–31)
CREAT SERPL-MCNC: 1.15 MG/DL (ref 0.7–1.3)
GFR SERPL CREATININE-BSD FRML MDRD: 76 ML/MIN/1.73M2
GLUCOSE BLD-MCNC: 148 MG/DL (ref 70–125)
HBA1C MFR BLD: 8.6 % (ref 0–5.6)
POTASSIUM BLD-SCNC: 4.1 MMOL/L (ref 3.5–5)
SODIUM SERPL-SCNC: 139 MMOL/L (ref 136–145)

## 2022-04-13 PROCEDURE — 83036 HEMOGLOBIN GLYCOSYLATED A1C: CPT

## 2022-04-13 PROCEDURE — 36415 COLL VENOUS BLD VENIPUNCTURE: CPT

## 2022-04-13 PROCEDURE — 80048 BASIC METABOLIC PNL TOTAL CA: CPT

## 2022-04-13 RX ORDER — DULAGLUTIDE 1.5 MG/.5ML
1.5 INJECTION, SOLUTION SUBCUTANEOUS
Qty: 2 ML | Refills: 2 | Status: SHIPPED | OUTPATIENT
Start: 2022-04-13 | End: 2022-07-05

## 2022-04-13 NOTE — TELEPHONE ENCOUNTER
Rx set up for approval. Can you please check sig and amount before you send it off? I tried to find the closest needles and syringes. He only gives himself testosterone shots every 14 days.   Thanks,  Arturo Ochoa RN

## 2022-05-29 ENCOUNTER — HEALTH MAINTENANCE LETTER (OUTPATIENT)
Age: 52
End: 2022-05-29

## 2022-06-09 ENCOUNTER — TRANSFERRED RECORDS (OUTPATIENT)
Dept: HEALTH INFORMATION MANAGEMENT | Facility: CLINIC | Age: 52
End: 2022-06-09
Payer: COMMERCIAL

## 2022-06-09 LAB — RETINOPATHY: NEGATIVE

## 2022-07-05 DIAGNOSIS — E11.9 TYPE 2 DIABETES MELLITUS WITHOUT COMPLICATION, WITHOUT LONG-TERM CURRENT USE OF INSULIN (H): ICD-10-CM

## 2022-07-05 RX ORDER — DULAGLUTIDE 1.5 MG/.5ML
INJECTION, SOLUTION SUBCUTANEOUS
Qty: 2 ML | Refills: 1 | Status: SHIPPED | OUTPATIENT
Start: 2022-07-05 | End: 2022-08-31

## 2022-07-06 NOTE — TELEPHONE ENCOUNTER
"Last Written Prescription Date:  4/13/22  Last Fill Quantity: 2 Ml,  # refills: 2   Last office visit provider:  1/5/22     Requested Prescriptions   Pending Prescriptions Disp Refills     TRULICITY 1.5 MG/0.5ML pen [Pharmacy Med Name: TRULICITY 1.5MG/0.5ML SOPN] 2 mL 1     Sig: INJECT THE CONTENTS OF ONE SYRINGE UNDER THE SKIN EVERY 7 DAYS       GLP-1 Agonists Protocol Passed - 7/5/2022 11:32 AM        Passed - HgbA1C in past 3 or 6 months     If HgbA1C is 8 or greater, it needs to be on file within the past 3 months.  If less than 8, must be on file within the past 6 months.     Recent Labs   Lab Test 04/13/22  1443   A1C 8.6*             Passed - Medication is active on med list        Passed - Patient is age 18 or older        Passed - Normal serum creatinine on file in past 12 months     Recent Labs   Lab Test 04/13/22  1443   CR 1.15       Ok to refill medication if creatinine is low          Passed - Recent (6 mo) or future (30 days) visit within the authorizing provider's specialty     Patient had office visit in the last 6 months or has a visit in the next 30 days with authorizing provider.  See \"Patient Info\" tab in inbasket, or \"Choose Columns\" in Meds & Orders section of the refill encounter.                 Mariela Lawson 07/05/22 7:50 PM  "

## 2022-07-28 ENCOUNTER — OFFICE VISIT (OUTPATIENT)
Dept: FAMILY MEDICINE | Facility: CLINIC | Age: 52
End: 2022-07-28
Payer: COMMERCIAL

## 2022-07-28 VITALS
BODY MASS INDEX: 34.77 KG/M2 | HEIGHT: 76 IN | DIASTOLIC BLOOD PRESSURE: 80 MMHG | SYSTOLIC BLOOD PRESSURE: 120 MMHG | OXYGEN SATURATION: 95 % | TEMPERATURE: 98.2 F | WEIGHT: 285.5 LBS | HEART RATE: 75 BPM

## 2022-07-28 DIAGNOSIS — H10.31 ACUTE CONJUNCTIVITIS OF RIGHT EYE, UNSPECIFIED ACUTE CONJUNCTIVITIS TYPE: ICD-10-CM

## 2022-07-28 DIAGNOSIS — E11.42 DIABETIC POLYNEUROPATHY ASSOCIATED WITH TYPE 2 DIABETES MELLITUS (H): ICD-10-CM

## 2022-07-28 DIAGNOSIS — Z12.5 SCREENING FOR PROSTATE CANCER: ICD-10-CM

## 2022-07-28 DIAGNOSIS — R11.0 NAUSEA: ICD-10-CM

## 2022-07-28 DIAGNOSIS — E55.9 VITAMIN D DEFICIENCY: ICD-10-CM

## 2022-07-28 DIAGNOSIS — E66.01 MORBID OBESITY (H): ICD-10-CM

## 2022-07-28 DIAGNOSIS — F33.9 RECURRENT MAJOR DEPRESSIVE DISORDER, REMISSION STATUS UNSPECIFIED (H): ICD-10-CM

## 2022-07-28 DIAGNOSIS — E78.5 HYPERLIPIDEMIA LDL GOAL <100: ICD-10-CM

## 2022-07-28 DIAGNOSIS — Z00.00 ROUTINE HISTORY AND PHYSICAL EXAMINATION OF ADULT: Primary | ICD-10-CM

## 2022-07-28 DIAGNOSIS — E11.9 TYPE 2 DIABETES MELLITUS WITHOUT COMPLICATION, WITHOUT LONG-TERM CURRENT USE OF INSULIN (H): ICD-10-CM

## 2022-07-28 DIAGNOSIS — E29.1 HYPOGONADISM MALE: ICD-10-CM

## 2022-07-28 LAB
ALBUMIN SERPL BCG-MCNC: 4.7 G/DL (ref 3.5–5.2)
ALP SERPL-CCNC: 86 U/L (ref 40–129)
ALT SERPL W P-5'-P-CCNC: 29 U/L (ref 10–50)
ANION GAP SERPL CALCULATED.3IONS-SCNC: 12 MMOL/L (ref 7–15)
AST SERPL W P-5'-P-CCNC: 25 U/L (ref 10–50)
BILIRUB SERPL-MCNC: 1.6 MG/DL
BUN SERPL-MCNC: 11.5 MG/DL (ref 6–20)
CALCIUM SERPL-MCNC: 9.5 MG/DL (ref 8.6–10)
CHLORIDE SERPL-SCNC: 100 MMOL/L (ref 98–107)
CHOLEST SERPL-MCNC: 185 MG/DL
CREAT SERPL-MCNC: 0.98 MG/DL (ref 0.67–1.17)
CREAT UR-MCNC: 186 MG/DL
DEPRECATED HCO3 PLAS-SCNC: 24 MMOL/L (ref 22–29)
ERYTHROCYTE [DISTWIDTH] IN BLOOD BY AUTOMATED COUNT: 12.6 % (ref 10–15)
GFR SERPL CREATININE-BSD FRML MDRD: >90 ML/MIN/1.73M2
GLUCOSE SERPL-MCNC: 182 MG/DL (ref 70–99)
HBA1C MFR BLD: 6.9 % (ref 0–5.6)
HCT VFR BLD AUTO: 54.5 % (ref 40–53)
HDLC SERPL-MCNC: 47 MG/DL
HGB BLD-MCNC: 19.7 G/DL (ref 13.3–17.7)
LDLC SERPL CALC-MCNC: 116 MG/DL
MCH RBC QN AUTO: 31.5 PG (ref 26.5–33)
MCHC RBC AUTO-ENTMCNC: 36.1 G/DL (ref 31.5–36.5)
MCV RBC AUTO: 87 FL (ref 78–100)
MICROALBUMIN UR-MCNC: 17.6 MG/L
MICROALBUMIN/CREAT UR: 9.46 MG/G CR (ref 0–17)
NONHDLC SERPL-MCNC: 138 MG/DL
PLATELET # BLD AUTO: 267 10E3/UL (ref 150–450)
POTASSIUM SERPL-SCNC: 4.8 MMOL/L (ref 3.4–5.3)
PROT SERPL-MCNC: 7.7 G/DL (ref 6.4–8.3)
PSA SERPL-MCNC: 0.37 NG/ML (ref 0–3.5)
RBC # BLD AUTO: 6.25 10E6/UL (ref 4.4–5.9)
SODIUM SERPL-SCNC: 136 MMOL/L (ref 136–145)
TRIGL SERPL-MCNC: 109 MG/DL
TSH SERPL DL<=0.005 MIU/L-ACNC: 1.09 UIU/ML (ref 0.3–4.2)
WBC # BLD AUTO: 6.6 10E3/UL (ref 4–11)

## 2022-07-28 PROCEDURE — 80053 COMPREHEN METABOLIC PANEL: CPT | Performed by: FAMILY MEDICINE

## 2022-07-28 PROCEDURE — 82306 VITAMIN D 25 HYDROXY: CPT | Performed by: FAMILY MEDICINE

## 2022-07-28 PROCEDURE — 82043 UR ALBUMIN QUANTITATIVE: CPT | Performed by: FAMILY MEDICINE

## 2022-07-28 PROCEDURE — 84443 ASSAY THYROID STIM HORMONE: CPT | Performed by: FAMILY MEDICINE

## 2022-07-28 PROCEDURE — G0103 PSA SCREENING: HCPCS | Performed by: FAMILY MEDICINE

## 2022-07-28 PROCEDURE — 36415 COLL VENOUS BLD VENIPUNCTURE: CPT | Performed by: FAMILY MEDICINE

## 2022-07-28 PROCEDURE — 85027 COMPLETE CBC AUTOMATED: CPT | Performed by: FAMILY MEDICINE

## 2022-07-28 PROCEDURE — 83036 HEMOGLOBIN GLYCOSYLATED A1C: CPT | Performed by: FAMILY MEDICINE

## 2022-07-28 PROCEDURE — 84403 ASSAY OF TOTAL TESTOSTERONE: CPT | Performed by: FAMILY MEDICINE

## 2022-07-28 PROCEDURE — 99214 OFFICE O/P EST MOD 30 MIN: CPT | Mod: 25 | Performed by: FAMILY MEDICINE

## 2022-07-28 PROCEDURE — 99396 PREV VISIT EST AGE 40-64: CPT | Performed by: FAMILY MEDICINE

## 2022-07-28 PROCEDURE — 80061 LIPID PANEL: CPT | Performed by: FAMILY MEDICINE

## 2022-07-28 RX ORDER — POLYMYXIN B SULFATE AND TRIMETHOPRIM 1; 10000 MG/ML; [USP'U]/ML
1 SOLUTION OPHTHALMIC 4 TIMES DAILY
Qty: 10 ML | Refills: 0 | Status: SHIPPED | OUTPATIENT
Start: 2022-07-28 | End: 2022-08-04

## 2022-07-28 RX ORDER — ONDANSETRON 4 MG/1
4 TABLET, ORALLY DISINTEGRATING ORAL EVERY 8 HOURS PRN
Qty: 10 TABLET | Refills: 1 | Status: SHIPPED | OUTPATIENT
Start: 2022-07-28 | End: 2023-09-11

## 2022-07-28 RX ORDER — DULOXETIN HYDROCHLORIDE 30 MG/1
CAPSULE, DELAYED RELEASE ORAL
Qty: 90 CAPSULE | Refills: 3 | Status: SHIPPED | OUTPATIENT
Start: 2022-07-28 | End: 2023-08-05

## 2022-07-28 ASSESSMENT — PATIENT HEALTH QUESTIONNAIRE - PHQ9
SUM OF ALL RESPONSES TO PHQ QUESTIONS 1-9: 13
10. IF YOU CHECKED OFF ANY PROBLEMS, HOW DIFFICULT HAVE THESE PROBLEMS MADE IT FOR YOU TO DO YOUR WORK, TAKE CARE OF THINGS AT HOME, OR GET ALONG WITH OTHER PEOPLE: SOMEWHAT DIFFICULT
SUM OF ALL RESPONSES TO PHQ QUESTIONS 1-9: 13

## 2022-07-28 ASSESSMENT — ENCOUNTER SYMPTOMS
HEADACHES: 0
EYE PAIN: 1
DIARRHEA: 0
CONSTIPATION: 0
ABDOMINAL PAIN: 0
FREQUENCY: 0
HEARTBURN: 0
COUGH: 0
WEAKNESS: 0
DIZZINESS: 0
PARESTHESIAS: 1
HEMATOCHEZIA: 0
SHORTNESS OF BREATH: 0
ARTHRALGIAS: 1
JOINT SWELLING: 1
PALPITATIONS: 0
NAUSEA: 1
CHILLS: 0
SORE THROAT: 0
HEMATURIA: 0
FEVER: 0
MYALGIAS: 1
DYSURIA: 0
NERVOUS/ANXIOUS: 0

## 2022-07-28 ASSESSMENT — PAIN SCALES - GENERAL: PAINLEVEL: NO PAIN (0)

## 2022-07-28 NOTE — LETTER
My Depression Action Plan  Name: Morris Christopher   Date of Birth 1970  Date: 7/31/2022    My doctor: Blanca Silver   My clinic: [facility]  [address] [place] [address] [address]VE [facility] [address]  [facility] [id number] [phone]          GREEN    ZONE   Good Control    What it looks like:     Things are going generally well. You have normal ups and downs. You may even feel depressed from time to time, but bad moods usually last less than a day.   What you need to do:  1. Continue to care for yourself (see self care plan)  2. Check your depression survival kit and update it as needed  3. Follow your physician s recommendations including any medication.  4. Do not stop taking medication unless you consult with your physician first.           YELLOW         ZONE Getting Worse    What it looks like:     Depression is starting to interfere with your life.     It may be hard to get out of bed; you may be starting to isolate yourself from others.    Symptoms of depression are starting to last most all day and this has happened for several days.     You may have suicidal thoughts but they are not constant.   What you need to do:     1. Call your care team. Your response to treatment will improve if you keep your care team informed of your progress. Yellow periods are signs an adjustment may need to be made.     2. Continue your self-care.  Just get dressed and ready for the day.  Don't give yourself time to talk yourself out of it.    3. Talk to someone in your support network.    4. Open up your Depression Self-Care Plan/Wellness Kit.           RED    ZONE Medical Alert - Get Help    What it looks like:     Depression is seriously interfering with your life.     You may experience these or other symptoms: You can t get out of bed most days, can t work or engage in other necessary activities, you have trouble taking care of basic hygiene, or basic responsibilities, thoughts of suicide or death that will not  go away, self-injurious behavior.     What you need to do:  1. Call your care team and request a same-day appointment. If they are not available (weekends or after hours) call your local crisis line, emergency room or 911.          Depression Self-Care Plan / Wellness Kit    Many people find that medication and therapy are helpful treatments for managing depression. In addition, making small changes to your everyday life can help to boost your mood and improve your wellbeing. Below are some tips for you to consider. Be sure to talk with your medical provider and/or behavioral health consultant if your symptoms are worsening or not improving.     Sleep   Sleep hygiene  means all of the habits that support good, restful sleep. It includes maintaining a consistent bedtime and wake time, using your bedroom only for sleeping or sex, and keeping the bedroom dark and free of distractions like a computer, smartphone, or television.     Develop a Healthy Routine  Maintain good hygiene. Get out of bed in the morning, make your bed, brush your teeth, take a shower, and get dressed. Don t spend too much time viewing media that makes you feel stressed. Find time to relax each day.    Exercise  Get some form of exercise every day. This will help reduce pain and release endorphins, the  feel good  chemicals in your brain. It can be as simple as just going for a walk or doing some gardening, anything that will get you moving.      Diet  Strive to eat healthy foods, including fruits and vegetables. Drink plenty of water. Avoid excessive sugar, caffeine, alcohol, and other mood-altering substances.     Stay Connected with Others  Stay in touch with friends and family members.    Manage Your Mood  Try deep breathing, massage therapy, biofeedback, or meditation. Take part in fun activities when you can. Try to find something to smile about each day.     Psychotherapy  Be open to working with a therapist if your provider recommends it.      Medication  Be sure to take your medication as prescribed. Most anti-depressants need to be taken every day. It usually takes several weeks for medications to work. Not all medicines work for all people. It is important to follow-up with your provider to make sure you have a treatment plan that is working for you. Do not stop your medication abruptly without first discussing it with your provider.    Crisis Resources   These hotlines are for both adults and children. They and are open 24 hours a day, 7 days a week unless noted otherwise.      National Suicide Prevention Lifeline   988 or 3-240-611-JBSH (3838)      Crisis Text Line    www.crisistextline.org  Text HOME to 870722 from anywhere in the [place], anytime, about any type of crisis. A live, trained crisis counselor will receive the text and respond quickly.      [place] Lifeline for LGBTQ Youth  A national crisis intervention and suicide lifeline for LGBTQ youth under 25. Provides a safe place to talk without judgement. Call 1-765.365.9763; text START to 609805 or visit www.thetrevorproject.org to talk to a trained counselor.      For [facility] crisis numbers, visit the [facility] website at:  https://[place].gov/dhs/people-we-serve/adults/health-care/mental-health/resources/crisis-contacts.jsp

## 2022-07-28 NOTE — PROGRESS NOTES
SUBJECTIVE:   CC: Morris Christopher is an 52 year old male who presents for preventative health visit.   Reviewed current medications, allergies, family and social history.  Chart updated.  At last visit was started on Trulicity.  This is working well for him.  Continues glipizide.  Occasionally forgets to take glipizide.  Estimates this is about 2-3 times per week.  Does get some nausea for couple days after Trulicity injection.  He has history of hypogonadism.  Is on testosterone injections.  Is on duloxetine for depression and neuropathy. Review of systems otherwise negative      Healthy Habits:     Getting at least 3 servings of Calcium per day:  NO    Bi-annual eye exam:  Yes    Dental care twice a year:  NO    Sleep apnea or symptoms of sleep apnea:  Daytime drowsiness    Diet:  Diabetic    Frequency of exercise:  None    Taking medications regularly:  Yes    Medication side effects:  Other    PHQ-2 Total Score: 3    Additional concerns today:  No  Red Eye            PROBLEMS TO ADD ON...  Eye Problem  Duration of complaint: Right eye has been red and tearing.  It is burning and itchy.  Has had light sensitivity.  Does not wear contacts.  Has been going on for couple of days.    Today's PHQ-2 Score:   PHQ-2 ( 1999 Pfizer) 7/28/2022   Q1: Little interest or pleasure in doing things 2   Q2: Feeling down, depressed or hopeless 1   PHQ-2 Score 3   Q1: Little interest or pleasure in doing things More than half the days   Q2: Feeling down, depressed or hopeless Several days   PHQ-2 Score 3             Social History     Tobacco Use     Smoking status: Former Smoker     Smokeless tobacco: Current User     Types: Chew   Substance Use Topics     Alcohol use: Not on file         Alcohol Use 7/28/2022   Prescreen: >3 drinks/day or >7 drinks/week? No     Last PSA:   Prostate Specific Antigen Screen   Date Value Ref Range Status   07/28/2022 0.37 0.00 - 3.50 ng/mL Final   03/05/2021 0.4 0.0 - 3.5 ng/mL Final       Reviewed  "orders with patient. Reviewed health maintenance and updated orders accordingly - Yes  Current Outpatient Medications   Medication Sig Dispense Refill     alcohol swab prep pads Use to swab area of injection/mark as directed. 300 each 3     blood glucose (ACCU-CHEK GUIDE) test strip Use to test blood sugar 2 times daily or as directed. 100 strip 11     blood glucose monitoring (FREESTYLE) lancets Use to test blood sugar 3 times daily. 300 each 3     DULoxetine (CYMBALTA) 30 MG capsule Take 1 tablet by mouth daily along with a 60 mg tablet for a total of 90 mg daily 90 capsule 3     DULoxetine (CYMBALTA) 60 MG capsule Take 1 capsule (60 mg) by mouth daily 90 capsule 3     EPINEPHrine (ANY BX GENERIC EQUIV) 0.3 MG/0.3ML injection 2-pack Inject 0.3 mg into the muscle as needed       glipiZIDE (GLUCOTROL) 10 MG tablet Take 1 tablet (10 mg) by mouth 2 times daily (before meals) 180 tablet 3     Needle, Disp, 25G X 1-1/2\" MISC Use one needle every 14 days 2 each 3     ondansetron (ZOFRAN ODT) 4 MG ODT tab Take 1 tablet (4 mg) by mouth every 8 hours as needed for nausea 10 tablet 1     sildenafil (VIAGRA) 50 MG tablet Take 50 mg by mouth as needed       Syringe/Needle, Disp, 18G X 1-1/2\" 3 ML MISC 1 Syringe every 14 days 2 each 3     testosterone cypionate (DEPOTESTOSTERONE) 200 MG/ML injection Inject 0.9 mLs (180 mg) into the muscle every 14 days 2 mL 5     trimethoprim-polymyxin b (POLYTRIM) 58885-7.1 UNIT/ML-% ophthalmic solution Place 1 drop into the right eye 4 times daily for 7 days 10 mL 0     TRULICITY 1.5 MG/0.5ML pen INJECT THE CONTENTS OF ONE SYRINGE UNDER THE SKIN EVERY 7 DAYS 2 mL 1       Reviewed and updated as needed this visit by clinical staff   Tobacco  Allergies  Meds                Reviewed and updated as needed this visit by Provider     Meds                   Review of Systems      OBJECTIVE:   /80   Pulse 75   Temp 98.2  F (36.8  C)   Ht 1.918 m (6' 3.5\")   Wt 129.5 kg (285 lb 8 oz)   " SpO2 95%   BMI 35.21 kg/m      Physical Exam  GENERAL: healthy, alert and no distress  EYES: conjunctiva/corneas- conjunctival injection right  HENT: normal cephalic/atraumatic and ear canals and TM's normal  NECK: no adenopathy, no asymmetry, masses, or scars and thyroid normal to palpation  RESP: lungs clear to auscultation - no rales, rhonchi or wheezes  CV: regular rate and rhythm, normal S1 S2, no S3 or S4, no murmur, click or rub, no peripheral edema and peripheral pulses strong  ABDOMEN: soft, nontender, no hepatosplenomegaly, no masses and bowel sounds normal  MS: no gross musculoskeletal defects noted, no edema  SKIN: no suspicious lesions or rashes  NEURO: Normal strength and tone, mentation intact and speech normal  PSYCH: mentation appears normal, affect normal/bright        ASSESSMENT/PLAN:   Morris was seen today for physical and red eye.    Diagnoses and all orders for this visit:    Routine history and physical examination of adult  Declines vaccine today.  Up-to-date on age-appropriate cancer screening.  Encouraged regular exercise and healthy diet.  Encouraged regular vision and dental exams    Recurrent major depressive disorder, remission status unspecified (H)  -     DULoxetine (CYMBALTA) 30 MG capsule; Take 1 tablet by mouth daily along with a 60 mg tablet for a total of 90 mg daily  -Increase dose of duloxetine to total of 90 mg daily.  Prescription for 30 mg capsules sent to pharmacy with directions to take along with 60 mg for total of 90 mg daily    Type 2 diabetes mellitus without complication, without long-term current use of insulin (H)  -     Lipid panel reflex to direct LDL Fasting; Future  -     Hemoglobin A1c; Future  -     Comprehensive metabolic panel (BMP + Alb, Alk Phos, ALT, AST, Total. Bili, TP); Future  -     Albumin Random Urine Quantitative with Creat Ratio; Future  -     Lipid panel reflex to direct LDL Fasting  -     Hemoglobin A1c  -     Comprehensive metabolic panel  (BMP + Alb, Alk Phos, ALT, AST, Total. Bili, TP)  -     Albumin Random Urine Quantitative with Creat Ratio  -Blood pressure controlled.  Check above labs.  Has been doing well with starting Trulicity.  Has lost weight.  Occasionally forgets to take glipizide.  This was 2-3 times a week.  He will work to improve compliance with this medication.  He will continue healthy lifestyle changes.  Eye exam up-to-date.  Statin has been recommended but he has declined to take.  Would like to see how he can improve his cholesterol with a still changes first.  He is interested in CGM.  We will send message to pharmacist to see if they can help him obtain 1.  Recommend follow-up in 6 months    Hypogonadism male  -     CBC with platelets; Future  -     Testosterone, total; Future  -     CBC with platelets  -     Testosterone, total  -He continues testosterone    Hyperlipidemia LDL goal <100  -     Lipid panel reflex to direct LDL Fasting; Future  -     Comprehensive metabolic panel (BMP + Alb, Alk Phos, ALT, AST, Total. Bili, TP); Future  -     TSH; Future  -     Lipid panel reflex to direct LDL Fasting  -     Comprehensive metabolic panel (BMP + Alb, Alk Phos, ALT, AST, Total. Bili, TP)  -     TSH  -He continues to work on healthy lifestyle changes and weight loss.  Has declined statin therapy.  Check labs today as above    Diabetic polyneuropathy associated with type 2 diabetes mellitus (H)  Work to keep diabetes under good control.  Increase Cymbalta to 90 mg daily    Morbid obesity (H)  Congratulated him on weight loss efforts.  Encouraged ongoing lifestyle changes and weight loss efforts    Screening for prostate cancer  -     PSA, screen; Future  -     PSA, screen    Nausea  -     ondansetron (ZOFRAN ODT) 4 MG ODT tab; Take 1 tablet (4 mg) by mouth every 8 hours as needed for nausea  -Provided prescription for ondansetron to use as needed for nausea associated with Trulicity injection      Vitamin D deficiency  -     Vitamin  "D Deficiency; Future  -     Vitamin D Deficiency    Acute conjunctivitis of right eye, unspecified acute conjunctivitis type  -     trimethoprim-polymyxin b (POLYTRIM) 93815-7.1 UNIT/ML-% ophthalmic solution; Place 1 drop into the right eye 4 times daily for 7 days            COUNSELING:   Reviewed preventive health counseling, as reflected in patient instructions    Estimated body mass index is 35.21 kg/m  as calculated from the following:    Height as of this encounter: 1.918 m (6' 3.5\").    Weight as of this encounter: 129.5 kg (285 lb 8 oz).     Weight management plan: Discussed healthy diet and exercise guidelines    He reports that he has quit smoking. His smokeless tobacco use includes chew.      Counseling Resources:  ATP IV Guidelines  Pooled Cohorts Equation Calculator  FRAX Risk Assessment  ICSI Preventive Guidelines  Dietary Guidelines for Americans, 2010  USDA's MyPlate  ASA Prophylaxis  Lung CA Screening    Blanca Silver MD  [facility]  "

## 2022-07-29 LAB — DEPRECATED CALCIDIOL+CALCIFEROL SERPL-MC: 18 UG/L (ref 20–75)

## 2022-08-01 LAB — TESTOST SERPL-MCNC: 307 NG/DL (ref 240–950)

## 2022-08-03 ENCOUNTER — MYC MEDICAL ADVICE (OUTPATIENT)
Dept: FAMILY MEDICINE | Facility: CLINIC | Age: 52
End: 2022-08-03

## 2022-08-03 DIAGNOSIS — R40.0 HAS DAYTIME DROWSINESS: Primary | ICD-10-CM

## 2022-08-03 DIAGNOSIS — Z01.89 NEED FOR ASSESSMENT FOR SLEEP APNEA: ICD-10-CM

## 2022-08-03 DIAGNOSIS — E29.1 HYPOGONADISM MALE: ICD-10-CM

## 2022-08-04 RX ORDER — TESTOSTERONE CYPIONATE 200 MG/ML
180 INJECTION, SOLUTION INTRAMUSCULAR
Qty: 2 ML | Refills: 5 | Status: SHIPPED | OUTPATIENT
Start: 2022-08-04 | End: 2023-02-19

## 2022-08-04 NOTE — TELEPHONE ENCOUNTER
Referral for sleep study and prescription for Testosterone pended if appropriate. Sebas Crook RN on 8/4/2022 at 7:44 AM

## 2022-08-10 NOTE — PROGRESS NOTES
Medication Therapy Management (MTM) Encounter    ASSESSMENT:                            Type 2 Diabetes: Last A1c at goal. He is having nausea with Trulicity but would like to continue. We discussed that in the future we can see if he tolerates Ozempic better. Will continue current Trulicity dose.   We discussed CGM today and he would like to try. Will start with a Yolanda and he will use his phone. We discussed how to interpret results, interstitial fluid, etc. I will check on coverage for him. If covered, I will send him info on the kim and how to apply and we will connect via [employer] and adjust his glipizide accordingly.     Neuropathy: Patient tolerated the transition well, will continue to monitor for improvement in neuropathy with duloxetine    Vitamin D Deficiency: Encouraged him to start Vitamin D 2000 units as recommended by PCP       PLAN:                            1.  Rx for FreeStyle Yolanda sensors sent    Follow-up: I will verify coverage tomorrow and be in touch on [employer]     SUBJECTIVE/OBJECTIVE:                          Morris Christopher is a 51 year old male called for a follow up visit. He was referred to me from Dr. Silver.      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  He takes his medicines in the morning, which is also his bedtime.  He works at [employer].     Type 2 Diabetes: Currently taking Trulicity 1.5 mg weekly and glipizide 10 mg twice daily (before meals).   We started Trulicity 0.75 mg on 1/13/22 and increased the dose on 4/13/22.   Given ondansetron on 7/28/22 to use for Trulicity. He does get nausea for a few days after but right now he can tolerate it.   Was on metformin in the past but stopped due to side effects.   Tests BG 1 times daily.  He is interested in CGM. We were able to get him Accu-Chek Guide glucometer at last MTM appointment. Reports BG lowest was 121,  mostly 150-160s.   Last A1c checked 7/28/22/22 = 6.9%.   Hypoglycemia none   Microalbumin checked 7/28/22  Last GFR >90 ml/min on 7/28/22  Started on simvastatin 10 mg daily, but he declines at this time. Last lipids checked 1/5/22.   Is not taking aspirin 81 mg for primary prevention.   He is interested in weight loss.   Wt Readings from Last 2 Encounters:   07/28/22 285 lb 8 oz (129.5 kg)   01/11/22 308 lb (139.7 kg)   The 10-year ASCVD risk score (Bolingedda ALTAMIRANO Jr., et al., 2013) is: 8.3%    Values used to calculate the score:      Age: 52 years      Sex: Male      Is Non- : No      Diabetic: Yes      Tobacco smoker: No      Systolic Blood Pressure: 120 mmHg      Is BP treated: No      HDL Cholesterol: 47 mg/dL      Total Cholesterol: 185 mg/dL      Neuropathy: Now taking duloxetine 90 mg daily. Changed from sertraline on 1/5/22.  . Some bad pain left foot but hard to say if neuropathy since he has bone spurs. Going ok so far for mood as well.     Vitamin D Deficiency: Last level on 7/28/22 = 18. Recommended to start 2000 units daily - he has not started yet.     Today's Vitals: There were no vitals taken for this visit.     Allergies/ADRs: Reviewed in chart  Past Medical History: Reviewed in chart  Tobacco: He reports that he has quit smoking. His smokeless tobacco use includes chew.  Alcohol: reviewed in chart    ----------------      I spent 15 minutes with this patient today. All changes were made via collaborative practice agreement with Blanca Silver MD. A copy of the visit note was provided to the patient's primary care provider.    The patient was sent via [employer] a summary of these recommendations.     Uma Archibald, Pharm.D., BCACP   Medication Therapy Management Pharmacist   [facility]     Telemedicine Visit Details  Type of service:  Telephone visit  Start Time: 2:05 PM  End Time: 2:20 PM  Originating Location (patient location):  Home  Distant Location (provider location):  [facility]     Medication Therapy Recommendations  No medication therapy recommendations to display

## 2022-08-11 ENCOUNTER — VIRTUAL VISIT (OUTPATIENT)
Dept: PHARMACY | Facility: CLINIC | Age: 52
End: 2022-08-11
Payer: COMMERCIAL

## 2022-08-11 DIAGNOSIS — E11.9 TYPE 2 DIABETES MELLITUS WITHOUT COMPLICATION, WITHOUT LONG-TERM CURRENT USE OF INSULIN (H): Primary | ICD-10-CM

## 2022-08-11 DIAGNOSIS — E55.9 VITAMIN D DEFICIENCY: ICD-10-CM

## 2022-08-11 DIAGNOSIS — G62.9 NEUROPATHY: ICD-10-CM

## 2022-08-11 PROCEDURE — 99606 MTMS BY PHARM EST 15 MIN: CPT | Performed by: PHARMACIST

## 2022-08-12 ENCOUNTER — TELEPHONE (OUTPATIENT)
Dept: FAMILY MEDICINE | Facility: CLINIC | Age: 52
End: 2022-08-12

## 2022-08-12 NOTE — TELEPHONE ENCOUNTER
Prior Authorization Request    Who's requesting: Patient  Pharmacy Name and Location:  [facility]  Medication Name: FreeStyle Yolanda 2 sensor  Insurance Plan and ID: , [id number]   Rationale:  Erratic work schedule  Informed patient that prior authorizations can take up to 10 business days for response:  yes    *Please forward response to Uma Archibald, YahirD*

## 2022-08-12 NOTE — TELEPHONE ENCOUNTER
Central Prior Authorization Team   Phone: [phone]      PA Initiation    Medication: Continuous Blood Gluc Sensor (FREESTYLE SANDRA 2 SENSOR) MISC - INITIATED  Insurance Company: [employer] - Phone [phone] Fax [phone]  Pharmacy Filling the Rx: [facility] [place], [place] - [address]  Filling Pharmacy Phone: [phone]  Filling Pharmacy Fax:    Start Date: 8/12/2022

## 2022-08-12 NOTE — TELEPHONE ENCOUNTER
Prior Authorization Retail Medication Request    Medication/Dose: Freestyle 2 Sensors - [facility] drug therapy not met. Per ins  ICD code (if different than what is on RX):  NA  Previously Tried and Failed:  NA  Rationale:  NA    Insurance Name:   PART B  Insurance ID:  [id number]      Pharmacy Information (if different than what is on RX)  Name:  [facility]  Phone:  [phone]

## 2022-08-17 NOTE — TELEPHONE ENCOUNTER
PRIOR AUTHORIZATION DENIED    Medication: Continuous Blood Gluc Sensor (FREESTYLE SANDRA 2 SENSOR) MISC - DENIED    Denial Date: 8/17/2022    Denial Rational: AFTER PROVIDING CHART NOTES AND AVAILABLE DOCUMENTATION, PATIENT DID NOT MEET CRITERIA FOR APPROVAL      Appeal Information: IF PROVIDER WOULD LIKE TO APPEAL THIS DECISION PLEASE PROVIDE PA TEAM WITH LETTER OF MEDICAL NECESSITY

## 2022-08-31 DIAGNOSIS — E11.9 TYPE 2 DIABETES MELLITUS WITHOUT COMPLICATION, WITHOUT LONG-TERM CURRENT USE OF INSULIN (H): ICD-10-CM

## 2022-08-31 RX ORDER — DULAGLUTIDE 1.5 MG/.5ML
INJECTION, SOLUTION SUBCUTANEOUS
Qty: 2 ML | Refills: 1 | Status: SHIPPED | OUTPATIENT
Start: 2022-08-31 | End: 2022-11-03

## 2022-08-31 NOTE — TELEPHONE ENCOUNTER
"Last Written Prescription Date:  7/5/22  Last Fill Quantity: 2,  # refills: 1   Last office visit provider:  7/28/22     Requested Prescriptions   Pending Prescriptions Disp Refills     TRULICITY 1.5 MG/0.5ML pen [Pharmacy Med Name: TRULICITY 1.5MG/0.5ML SOPN] 2 mL 1     Sig: INJECT THE CONTENTS OF ONE SYRINGE UNDER THE SKIN EVERY 7 DAYS       GLP-1 Agonists Protocol Passed - 8/31/2022 11:17 AM        Passed - HgbA1C in past 3 or 6 months     If HgbA1C is 8 or greater, it needs to be on file within the past 3 months.  If less than 8, must be on file within the past 6 months.     Recent Labs   Lab Test 07/28/22  1443   A1C 6.9*             Passed - Medication is active on med list        Passed - Patient is age 18 or older        Passed - Normal serum creatinine on file in past 12 months     Recent Labs   Lab Test 07/28/22  1443   CR 0.98       Ok to refill medication if creatinine is low          Passed - Recent (6 mo) or future (30 days) visit within the authorizing provider's specialty     Patient had office visit in the last 6 months or has a visit in the next 30 days with authorizing provider.  See \"Patient Info\" tab in inbasket, or \"Choose Columns\" in Meds & Orders section of the refill encounter.                 Amber Queen RN 08/31/22 4:28 PM  "

## 2022-10-03 ENCOUNTER — LAB REQUISITION (OUTPATIENT)
Dept: LAB | Facility: CLINIC | Age: 52
End: 2022-10-03

## 2022-10-03 ENCOUNTER — HEALTH MAINTENANCE LETTER (OUTPATIENT)
Age: 52
End: 2022-10-03

## 2022-10-03 PROCEDURE — 86481 TB AG RESPONSE T-CELL SUSP: CPT | Performed by: INTERNAL MEDICINE

## 2022-10-04 DIAGNOSIS — E29.1 HYPOGONADISM MALE: ICD-10-CM

## 2022-10-04 RX ORDER — NEEDLES, DISPOSABLE 25GX5/8"
NEEDLE, DISPOSABLE MISCELLANEOUS
Qty: 6 EACH | Refills: 3 | Status: SHIPPED | OUTPATIENT
Start: 2022-10-04 | End: 2023-10-24

## 2022-10-04 NOTE — TELEPHONE ENCOUNTER
"Routing refill request to provider for review/approval because:  Drug not on the [facility] refill protocol     Last Written Prescription Date:  4/13/22  Last Fill Quantity: 2,  # refills: 3   Last office visit provider:  7/28/22     Requested Prescriptions   Pending Prescriptions Disp Refills     B-D LUER-ASIYA SYRINGE 18G X 1-1/2\" 3 ML MISC [Pharmacy Med Name: BD LUER-ASIYA SYRIN 18G X 1-1/2\" MISC] 2 each 0     Sig: USE 1 SYRINGE EVERY 14 DAYS       There is no refill protocol information for this order        BD HYPODERMIC NEEDLE 25G X 1-1/2\" MISC [Pharmacy Med Name: BD HYPODERMIC NEE 25G X 1-1/2\" MISC]  0     Sig: USE ONE NEEDLE EVERY 14 DAYS       There is no refill protocol information for this order          Arun Marcos RN 10/04/22 3:08 PM  "

## 2022-10-05 LAB
GAMMA INTERFERON BACKGROUND BLD IA-ACNC: 0.02 IU/ML
M TB IFN-G BLD-IMP: NEGATIVE
M TB IFN-G CD4+ BCKGRND COR BLD-ACNC: 9.98 IU/ML
MITOGEN IGNF BCKGRD COR BLD-ACNC: 0.01 IU/ML
MITOGEN IGNF BCKGRD COR BLD-ACNC: 0.01 IU/ML
QUANTIFERON MITOGEN: 10 IU/ML
QUANTIFERON NIL TUBE: 0.02 IU/ML
QUANTIFERON TB1 TUBE: 0.03 IU/ML
QUANTIFERON TB2 TUBE: 0.03

## 2022-11-02 DIAGNOSIS — E11.9 TYPE 2 DIABETES MELLITUS WITHOUT COMPLICATION, WITHOUT LONG-TERM CURRENT USE OF INSULIN (H): ICD-10-CM

## 2022-11-03 ENCOUNTER — TELEPHONE (OUTPATIENT)
Dept: SLEEP MEDICINE | Facility: CLINIC | Age: 52
End: 2022-11-03

## 2022-11-03 ENCOUNTER — OFFICE VISIT (OUTPATIENT)
Dept: SLEEP MEDICINE | Facility: CLINIC | Age: 52
End: 2022-11-03
Attending: FAMILY MEDICINE
Payer: COMMERCIAL

## 2022-11-03 VITALS
SYSTOLIC BLOOD PRESSURE: 125 MMHG | BODY MASS INDEX: 35.8 KG/M2 | WEIGHT: 294 LBS | DIASTOLIC BLOOD PRESSURE: 86 MMHG | HEART RATE: 98 BPM | OXYGEN SATURATION: 97 % | HEIGHT: 76 IN

## 2022-11-03 DIAGNOSIS — G47.19 EXCESSIVE DAYTIME SLEEPINESS: ICD-10-CM

## 2022-11-03 DIAGNOSIS — R06.83 SNORING: ICD-10-CM

## 2022-11-03 DIAGNOSIS — F32.0 MILD MAJOR DEPRESSION (H): ICD-10-CM

## 2022-11-03 DIAGNOSIS — R25.8 NOCTURNAL LEG MOVEMENTS: ICD-10-CM

## 2022-11-03 DIAGNOSIS — G47.52 DREAM ENACTMENT BEHAVIOR: ICD-10-CM

## 2022-11-03 DIAGNOSIS — R06.81 WITNESSED APNEIC SPELLS: Primary | ICD-10-CM

## 2022-11-03 DIAGNOSIS — G47.26 SHIFT WORK SLEEP DISORDER: ICD-10-CM

## 2022-11-03 DIAGNOSIS — E66.01 MORBID OBESITY (H): ICD-10-CM

## 2022-11-03 PROCEDURE — 99205 OFFICE O/P NEW HI 60 MIN: CPT | Performed by: PHYSICIAN ASSISTANT

## 2022-11-03 RX ORDER — DULAGLUTIDE 1.5 MG/.5ML
INJECTION, SOLUTION SUBCUTANEOUS
Qty: 6 ML | Refills: 0 | Status: SHIPPED | OUTPATIENT
Start: 2022-11-03 | End: 2023-01-18

## 2022-11-03 NOTE — NURSING NOTE
"Chief Complaint   Patient presents with     Consult     Excessive daytime sleepiness       Initial /86   Pulse 98   Ht 1.918 m (6' 3.51\")   Wt 133.4 kg (294 lb)   SpO2 97%   BMI 36.25 kg/m   Estimated body mass index is 36.25 kg/m  as calculated from the following:    Height as of this encounter: 1.918 m (6' 3.51\").    Weight as of this encounter: 133.4 kg (294 lb).    Medication Reconciliation: complete    Neck circumference: 18 inches / 46 centimeters.    DME: no     ESS 13  CHACHO 21    "

## 2022-11-03 NOTE — PATIENT INSTRUCTIONS
"      MY TREATMENT INFORMATION FOR SLEEP APNEA-  Morris BECERRA Ashwin    Am I having a sleep study at a sleep center?  --->Due to normal delays, you will be contacted within 2-4 weeks to schedule    Frequently asked questions:  1. What is Obstructive Sleep Apnea (AYO)? AYO is the most common type of sleep apnea. Apnea means, \"without breath.\"  Apnea is most often caused by narrowing or collapse of the upper airway as muscles relax during sleep.   Almost everyone has occasional apneas. Most people with sleep apnea have had brief interruptions at night frequently for many years.  The severity of sleep apnea is related to how frequent and severe the events are.   2. What are the consequences of AYO? Symptoms include: feeling sleepy during the day, snoring loudly, gasping or stopping of breathing, trouble sleeping, and occasionally morning headaches or heartburn at night.  Sleepiness can be serious and even increase the risk of falling asleep while driving. Other health consequences may include development of high blood pressure and other cardiovascular disease in persons who are susceptible. Untreated AYO  can contribute to heart disease, stroke and diabetes.   3. What are the treatment options? In most situations, sleep apnea is a lifelong disease that must be managed with daily therapy. Medications are not effective for sleep apnea and surgery is generally not considered until other therapies have been tried. Your treatment is your choice . Continuous Positive Airway (CPAP) works right away and is the therapy that is effective in nearly everyone. An oral device to hold your jaw forward is usually the next most reliable option. Other options include postioning devices (to keep you off your back), weight loss, and surgery including a tongue pacing device. There is more detail about some of these options below.  4. Are my sleep studies covered by insurance? Although we will request verification of coverage, we advise you " also check in advance of the study to ensure there is coverage.    Important tips for those choosing CPAP and similar devices   Know your equipment:  CPAP is continuous positive airway pressure that prevents obstructive sleep apnea by keeping the throat from collapsing while you are sleeping. In most cases, the device is  smart  and can slowly self-adjusts if your throat collapses and keeps a record every day of how well you are treated-this information is available to you and your care team.  BPAP is bilevel positive airway pressure that keeps your throat open and also assists each breath with a pressure boost to maintain adequate breathing.  Special kinds of BPAP are used in patients who have inadequate breathing from lung or heart disease. In most cases, the device is  smart  and can slowly self-adjusts to assist breathing. Like CPAP, the device keeps a record of how well you are treated.  Your mask is your connection to the device. You get to choose what feels most comfortable and the staff will help to make sure if fits. Here: are some examples of the different masks that are available:       Key points to remember on your journey with sleep apnea:  Sleep study.  PAP devices often need to be adjusted during a sleep study to show that they are effective and adjusted right.  Good tips to remember: Try wearing just the mask during a quiet time during the day so your body adapts to wearing it. A humidifier is recommended for comfort in most cases to prevent drying of your nose and throat. Allergy medication from your provider may help you if you are having nasal congestion.  Getting settled-in. It takes more than one night for most of us to get used to wearing a mask. Try wearing just the mask during a quiet time during the day so your body adapts to wearing it. A humidifier is recommended for comfort in most cases. Our team will work with you carefully on the first day and will be in contact within 4 days and  again at 2 and 4 weeks for advice and remote device adjustments. Your therapy is evaluated by the device each day.   Use it every night. The more you are able to sleep naturally for 7-8 hours, the more likely you will have good sleep and to prevent health risks or symptoms from sleep apnea. Even if you use it 4 hours it helps. Occasionally all of us are unable to use a medical therapy, in sleep apnea, it is not dangerous to miss one night.   Communicate. Call our skilled team on the number provided on the first day if your visit for problems that make it difficult to wear the device. Over 2 out of 3 patients can learn to wear the device long-term with help from our team. Remember to call our team or your sleep providers if you are unable to wear the device as we may have other solutions for those who cannot adapt to mask CPAP therapy. It is recommended that you sleep your sleep provider within the first 3 months and yearly after that if you are not having problems.   Use it for your health. We encourage use of CPAP masks during daytime quiet periods to allow your face and brain to adapt to the sensation of CPAP so that it will be a more natural sensation to awaken to at night or during naps. This can be very useful during the first few weeks or months of adapting to CPAP though it does not help medically to wear CPAP during wakefulness and  should not be used as a strategy just to meet guidelines.  Take care of your equipment. Make sure you clean your mask and tubing using directions every day and that your filter and mask are replaced as recommended or if they are not working.     BESIDES CPAP, WHAT OTHER THERAPIES ARE THERE?    Positioning Device  Positioning devices are generally used when sleep apnea is mild and only occurs on your back.This example shows a pillow that straps around the waist. It may be appropriate for those whose sleep study shows milder sleep apnea that occurs primarily when lying flat on  one's back. Preliminary studies have shown benefit but effectiveness at home may need to be verified by a home sleep test. These devices are generally not covered by medical insurance.  Examples of devices that maintain sleeping on the back to prevent snoring and mild sleep apnea.    Belt type body positioner  http://[employer]/    Electronic reminder  http://nightshifttherapy.com/            Oral Appliance  What is oral appliance therapy?  An oral appliance device fits on your teeth at night like a retainer used after having braces. The device is made by a specialized dentist and requires several visits over 1-2 months before a manufactured device is made to fit your teeth and is adjusted to prevent your sleep apnea. Once an oral device is working properly, snoring should be improved. A home sleep test may be recommended at that time if to determine whether the sleep apnea is adequately treated.       Some things to remember:  -Oral devices are often, but not always, covered by your medical insurance. Be sure to check with your insurance provider.   -If you are referred for oral therapy, you will be given a list of specialized dentists to consider or you may choose to visit the Web site of the American Academy of Dental Sleep Medicine  -Oral devices are less likely to work if you have severe sleep apnea or are extremely overweight.     More detailed information  An oral appliance is a small acrylic device that fits over the upper and lower teeth  (similar to a retainer or a mouth guard). This device slightly moves jaw forward, which moves the base of the tongue forward, opens the airway, improves breathing for effective treat snoring and obstructive sleep apnea in perhaps 7 out of 10 people .  The best working devices are custom-made by a dental device  after a mold is made of the teeth 1, 2, 3.  When is an oral appliance indicated?  Oral appliance therapy is recommended as a first-line treatment for  patients with primary snoring, mild sleep apnea, and for patients with moderate sleep apnea who prefer appliance therapy to use of CPAP4, 5. Severity of sleep apnea is determined by sleep testing and is based on the number of respiratory events per hour of sleep.   How successful is oral appliance therapy?  The success rate of oral appliance therapy in patients with mild sleep apnea is 75-80% while in patients with moderate sleep apnea it is 50-70%. The chance of success in patients with severe sleep apnea is 40-50%. The research also shows that oral appliances have a beneficial effect on the cardiovascular health of AYO patients at the same magnitude as CPAP therapy7.  Oral appliances should be a second-line treatment in cases of severe sleep apnea, but if not completely successful then a combination therapy utilizing CPAP plus oral appliance therapy may be effective. Oral appliances tend to be effective in a broad range of patients although studies show that the patients who have the highest success are females, younger patients, those with milder disease, and less severe obesity. 3, 6.   Finding a dentist that practices dental sleep medicine  Specific training is available through the American Academy of Dental Sleep Medicine for dentists interested in working in the field of sleep. To find a dentist who is educated in the field of sleep and the use of oral appliances, near you, visit the Web site of the American Academy of Dental Sleep Medicine.    References  1. Jose M et al. Objectively measured vs self-reported compliance during oral appliance therapy for sleep-disordered breathing. Chest 2013; 144(5): 2543-9900.  2. Todd et al. Objective measurement of compliance during oral appliance therapy for sleep-disordered breathing. Thorax 2013; 68(1): 91-96.  3. Ryan et al. Mandibular advancement devices in 620 men and women with AYO and snoring: tolerability and predictors of treatment success.  Chest 2004; 125: 2441-1044.  4. Carey et al. Oral appliances for snoring and AYO: a review. Sleep 2006; 29: 244-262.  5. Karo et al. Oral appliance treatment for AYO: an update. J Clin Sleep Med 2014; 10(2): 215-227.  6. Allyson et al. Predictors of OSAH treatment outcome. J Dent Res 2007; 86: 9464-4136.      Weight Loss:    Weight loss is a long-term strategy that may improve sleep apnea in some patients.    Weight management is a personal decision and the decision should be based on your interest and the potential benefits.  If you are interested in exploring weight loss strategies, the following discussion covers the impact on weight loss on sleep apnea and the approaches that may be successful.    Being overweight does not necessarily mean you will have health consequences.  Those who have BMI over 35 or over 27 with existing medical conditions carries greater risk.   Weight loss decreases severity of sleep apnea in most people with obesity. For those with mild obesity who have developed snoring with weight gain, even 15-30 pound weight loss can improve and occasionally eliminate sleep apnea.  Structured and life-long dietary and health habits are necessary to lose weight and keep healthier weight levels.     Though there may be significant health benefits from weight loss, long-term weight loss is very difficult to achieve- studies show success with dietary management in less than 10% of people. In addition, substantial weight loss may require years of dietary control and may be difficult if patients have severe obesity. In these cases, surgical management may be considered.  Finally, older individuals who have tolerated obesity without health complications may be less likely to benefit from weight loss strategies.      [unfilled]    Surgery:    Surgery for obstructive sleep apnea is considered generally only when other therapies fail to work. Surgery may be discussed with you if you are having a  difficult time tolerating CPAP and or when there is an abnormal structure that requires surgical correction.  Nose and throat surgeries often enlarge the airway to prevent collapse.  Most of these surgeries create pain for 1-2 weeks and up to half of the most common surgeries are not effective throughout life.  You should carefully discuss the benefits and drawbacks to surgery with your sleep provider and surgeon to determine if it is the best solution for you.   More information  Surgery for AYO is directed at areas that are responsible for narrowing or complete obstruction of the airway during sleep.  There are a wide range of procedures available to enlarge and/or stabilize the airway to prevent blockage of breathing in the three major areas where it can occur: the palate, tongue, and nasal regions.  Successful surgical treatment depends on the accurate identification of the factors responsible for obstructive sleep apnea in each person.  A personalized approach is required because there is no single treatment that works well for everyone.  Because of anatomic variation, consultation with an examination by a sleep surgeon is a critical first step in determining what surgical options are best for each patient.  In some cases, examination during sedation may be recommended in order to guide the selection of procedures.  Patients will be counseled about risks and benefits as well as the typical recovery course after surgery. Surgery is typically not a cure for a person s AYO.  However, surgery will often significantly improve one s AYO severity (termed  success rate ).  Even in the absence of a cure, surgery will decrease the cardiovascular risk associated with OSA7; improve overall quality of life8 (sleepiness, functionality, sleep quality, etc).      Palate Procedures:  Patients with AYO often have narrowing of their airway in the region of their tonsils and uvula.  The goals of palate procedures are to widen the  airway in this region as well as to help the tissues resist collapse.  Modern palate procedure techniques focus on tissue conservation and soft tissue rearrangement, rather than tissue removal.  Often the uvula is preserved in this procedure. Residual sleep apnea is common in patient after pharyngoplasty with an average reduction in sleep apnea events of 33%2.      Tongue Procedures:  ExamWhile patients are awake, the muscles that surround the throat are active and keep this region open for breathing. These muscles relax during sleep, allowing the tongue and other structures to collapse and block breathing.  There are several different tongue procedures available.  Selection of a tongue base procedure depends on characteristics seen on physical exam.  Generally, procedures are aimed at removing bulky tissues in this area or preventing the back of the tongue from falling back during sleep.  Success rates for tongue surgery range from 50-62%3.    Hypoglossal Nerve Stimulation:  Hypoglossal nerve stimulation has recently received approval from the [place] Food and Drug Administration for the treatment of obstructive sleep apnea.  This is based on research showing that the system was safe and effective in treating sleep apnea6.  Results showed that the median AHI score decreased 68%, from 29.3 to 9.0. This therapy uses an implant system that senses breathing patterns and delivers mild stimulation to airway muscles, which keeps the airway open during sleep.  The system consists of three fully implanted components: a small generator (similar in size to a pacemaker), a breathing sensor, and a stimulation lead.  Using a small handheld remote, a patient turns the therapy on before bed and off upon awakening.    Candidates for this device must be greater than 18 years of age, have moderate to severe AYO (AHI between 15-65), BMI less than 35, have tried CPAP/oral appliance for at least 8 weeks without success, and have  appropriate upper airway anatomy (determined by a sleep endoscopy performed by Dr. Oswaldo Pagan).    Hypoglossal Nerve Stimulation Pathway:    The sleep surgeon s office will work with the patient through the insurance prior-authorization process (including communications and appeals).    Nasal Procedures:  Nasal obstruction can interfere with nasal breathing during the day and night.  Studies have shown that relief of nasal obstruction can improve the ability of some patients to tolerate positive airway pressure therapy for obstructive sleep apnea1.  Treatment options include medications such as nasal saline, topical corticosteroid and antihistamine sprays, and oral medications such as antihistamines or decongestants. Non-surgical treatments can include external nasal dilators for selected patients. If these are not successful by themselves, surgery can improve the nasal airway either alone or in combination with these other options.      Combination Procedures:  Combination of surgical procedures and other treatments may be recommended, particularly if patients have more than one area of narrowing or persistent positional disease.  The success rate of combination surgery ranges from 66-80%2,3.    References  Surjit MAYFIELD. The Role of the Nose in Snoring and Obstructive Sleep Apnoea: An Update.  Eur Arch Otorhinolaryngol. 2011; 268: 1365-73.   Capdwight SM; Rafael JA; Casi JR; Pallanch JF; Beto MB; Tiffani SG; Mackenzie XIE. Surgical modifications of the upper airway for obstructive sleep apnea in adults: a systematic review and meta-analysis. SLEEP 2010;33(10):1154-3944. Prince CHEEK. Hypopharyngeal surgery in obstructive sleep apnea: an evidence-based medicine review.  Arch Otolaryngol Head Neck Surg. 2006 Feb;132(2):206-13.  Joe YH1, Marlena Y, Daniel LUIS E. The efficacy of anatomically based multilevel surgery for obstructive sleep apnea. Otolaryngol Head Neck Surg. 2003 Oct;129(4):327-35.  Prince CHEEK, Goldberg A.  Hypopharyngeal Surgery in Obstructive Sleep Apnea: An Evidence-Based Medicine Review. Arch Otolaryngol Head Neck Surg. 2006 Feb;132(2):206-13.  Mayo DUNHAM et al. Upper-Airway Stimulation for Obstructive Sleep Apnea.  N Engl J Med. 2014 Jan 9;370(2):139-49.  Kristopher Y et al. Increased Incidence of Cardiovascular Disease in Middle-aged Men with Obstructive Sleep Apnea. Am J Respir Crit Care Med; 2002 166: 159-165  Ralph EM et al. Studying Life Effects and Effectiveness of Palatopharyngoplasty (SLEEP) study: Subjective Outcomes of Isolated Uvulopalatopharyngoplasty. Otolaryngol Head Neck Surg. 2011; 144: 623-631.        WHAT IF I ONLY HAVE SNORING?    Mandibular advancement devices, lateral sleep positioning, long-term weight loss and treatment of nasal allergies have been shown to improve snoring.  Exercising tongue muscles with a game ([employer]ps://apps[employer]/us/kim/soundly-reduce-snoring/yb8401576653) or stimulating the tongue during the day with a device (https://doi.org/10.3390/fmh08476077) have improved snoring in some individuals.    Remember to Drive Safe... Drive Alive     Sleep health profoundly affects your health, mood, and your safety.  Thirty three percent of the population (one in three of us) is not getting enough sleep and many have a sleep disorder. Not getting enough sleep or having an untreated / undertreated sleep condition may make us sleepy without even knowing it. In fact, our driving could be dramatically impaired due to our sleep health. As your provider, here are some things I would like you to know about driving:     Here are some warning signs for impairment and dangerous drowsy driving:              -Having been awake more than 16 hours               -Looking tired               -Eyelid drooping              -Head nodding (it could be too late at this point)              -Driving for more than 30 minutes     Some things you could do to make the driving safer if you are experiencing some  drowsiness:              -Stop driving and rest              -Call for transportation              -Make sure your sleep disorder is adequately treated     Some things that have been shown NOT to work when experiencing drowsiness while driving:              -Turning on the radio              -Opening windows              -Eating any  distracting  /  entertaining  foods (e.g., sunflower seeds, candy, or any other)              -Talking on the phone      Your decision may not only impact your life, but also the life of others. Please, remember to drive safe for yourself and all of us.

## 2022-11-03 NOTE — TELEPHONE ENCOUNTER
Reason for call:  Other   Patient called regarding (reason for call): call back  Additional comments: Patient is in need of sleep study scheduling. Unusual study time indicated on order.     Phone number to reach patient:  Home number on file [phone] (home)    Best Time:  Any time    Can we leave a detailed message on this number?  YES    Travel screening: Not Applicable

## 2022-11-03 NOTE — TELEPHONE ENCOUNTER
"Last Written Prescription Date:  8/31/22  Last Fill Quantity: 2 ml,  # refills: 1   Last office visit provider:  7/28/22     Requested Prescriptions   Pending Prescriptions Disp Refills     TRULICITY 1.5 MG/0.5ML pen [Pharmacy Med Name: TRULICITY 1.5MG/0.5ML SOPN] 2 mL 1     Sig: INJECT THE CONTENTS OF ONE SYRINGE UNDER THE SKIN EVERY 7 DAYS       GLP-1 Agonists Protocol Passed - 11/3/2022  7:43 AM        Passed - HgbA1C in past 3 or 6 months     If HgbA1C is 8 or greater, it needs to be on file within the past 3 months.  If less than 8, must be on file within the past 6 months.     Recent Labs   Lab Test 07/28/22  1443   A1C 6.9*             Passed - Medication is active on med list        Passed - Patient is age 18 or older        Passed - Normal serum creatinine on file in past 12 months     Recent Labs   Lab Test 07/28/22  1443   CR 0.98       Ok to refill medication if creatinine is low          Passed - Recent (6 mo) or future (30 days) visit within the authorizing provider's specialty     Patient had office visit in the last 6 months or has a visit in the next 30 days with authorizing provider.  See \"Patient Info\" tab in inbasket, or \"Choose Columns\" in Meds & Orders section of the refill encounter.                 Arun Marcos RN 11/03/22 7:44 AM  " quit on own
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
## 2023-01-18 ENCOUNTER — MYC REFILL (OUTPATIENT)
Dept: FAMILY MEDICINE | Facility: CLINIC | Age: 53
End: 2023-01-18
Payer: COMMERCIAL

## 2023-01-18 DIAGNOSIS — E11.9 TYPE 2 DIABETES MELLITUS WITHOUT COMPLICATION, WITHOUT LONG-TERM CURRENT USE OF INSULIN (H): ICD-10-CM

## 2023-01-20 RX ORDER — DULAGLUTIDE 1.5 MG/.5ML
1.5 INJECTION, SOLUTION SUBCUTANEOUS
Qty: 6 ML | Refills: 3 | Status: SHIPPED | OUTPATIENT
Start: 2023-01-20 | End: 2023-04-20

## 2023-01-20 NOTE — TELEPHONE ENCOUNTER
"Routing refill request to provider for review/approval because:  Patient needs to be seen because:  It has been 6 mos since last office visit.  New med just added to med list.    Last Written Prescription Date:  11/03/2022  Last Fill Quantity: 6 mL,  # refills: 0   Last office visit provider:   07/28/2022    Requested Prescriptions   Pending Prescriptions Disp Refills     dulaglutide (TRULICITY) 1.5 MG/0.5ML pen 6 mL 0       GLP-1 Agonists Protocol Failed - 1/18/2023  8:35 PM        Failed - Medication is active on med list        Passed - HgbA1C in past 3 or 6 months     If HgbA1C is 8 or greater, it needs to be on file within the past 3 months.  If less than 8, must be on file within the past 6 months.     Recent Labs   Lab Test 07/28/22  1443   A1C 6.9*             Passed - Patient is age 18 or older        Passed - Normal serum creatinine on file in past 12 months     Recent Labs   Lab Test 07/28/22  1443   CR 0.98       Ok to refill medication if creatinine is low          Passed - Recent (6 mo) or future (30 days) visit within the authorizing provider's specialty     Patient had office visit in the last 6 months or has a visit in the next 30 days with authorizing provider.  See \"Patient Info\" tab in inbasket, or \"Choose Columns\" in Meds & Orders section of the refill encounter.                 Karla Roman 01/20/23 9:45 AM  "

## 2023-01-25 DIAGNOSIS — E11.9 TYPE 2 DIABETES MELLITUS WITHOUT COMPLICATION, WITHOUT LONG-TERM CURRENT USE OF INSULIN (H): Primary | ICD-10-CM

## 2023-02-11 ENCOUNTER — HEALTH MAINTENANCE LETTER (OUTPATIENT)
Age: 53
End: 2023-02-11

## 2023-02-17 DIAGNOSIS — E29.1 HYPOGONADISM MALE: ICD-10-CM

## 2023-02-17 DIAGNOSIS — E11.42 DIABETIC POLYNEUROPATHY ASSOCIATED WITH TYPE 2 DIABETES MELLITUS (H): ICD-10-CM

## 2023-02-18 RX ORDER — DULOXETIN HYDROCHLORIDE 60 MG/1
60 CAPSULE, DELAYED RELEASE ORAL DAILY
Qty: 90 CAPSULE | Refills: 1 | Status: SHIPPED | OUTPATIENT
Start: 2023-02-18 | End: 2023-08-16

## 2023-02-19 RX ORDER — TESTOSTERONE CYPIONATE 200 MG/ML
180 INJECTION, SOLUTION INTRAMUSCULAR
Qty: 2 ML | Refills: 5 | Status: SHIPPED | OUTPATIENT
Start: 2023-02-19 | End: 2023-08-05

## 2023-02-19 NOTE — TELEPHONE ENCOUNTER
Routing refill request to provider for review/approval because:  Needs review    Last Written Prescription Date:  8/4/22  Last Fill Quantity: 2,  # refills: 5   Last office visit provider:  7/28/22     Requested Prescriptions   Pending Prescriptions Disp Refills     testosterone cypionate (DEPOTESTOSTERONE) 200 MG/ML injection [Pharmacy Med Name: TESTOSTERONE CYPIONATE 200 SOLN] 2 mL 5     Sig: INJECT 0.9 MLS (180 MG) INTO THE MUSCLE EVERY 14 DAYS       Androgen Agents Failed - 2/17/2023  2:14 PM        Failed - HCT less than 54% on file within past 12 mos     Recent Labs   Lab Test 07/28/22  1443   HCT 54.5*             Failed - Refills for this classification require provider review        Failed - Recent (6 mo) or future (30 days) visit within the authorizing provider's specialty        Passed - Patient is of age 12 and older        Passed - Lipid panel on file in past 12 mos     Recent Labs   Lab Test 07/28/22  1443   CHOL 185   TRIG 109   HDL 47   *   NHDL 138*               Passed - ALT on file within past 12 mos     Recent Labs   Lab Test 07/28/22  1443   ALT 29             Passed - Medication is active on med list        Passed - Serum Testosterone on file within past 12 mos     Recent Labs   Lab Test 07/28/22  1443   TESTOSTTOTAL 307             Passed - Serum PSA on file within past 12 mos     Lab Results   Component Value Date    PSA 0.37 07/28/2022    PSA 0.4 03/05/2021             Passed - Blood pressure under 140/90 in past 6 months     BP Readings from Last 3 Encounters:   11/03/22 125/86   07/28/22 120/80   01/18/22 136/82                 Passed - Patient is not pregnant        Passed - No positive pregnancy test on file within past 12 mos        Passed - AST on file within past 12 mos     Recent Labs   Lab Test 07/28/22  1443   AST 25                DULoxetine (CYMBALTA) 60 MG capsule [Pharmacy Med Name: DULOXETINE HCL 60MG CPEP] 90 capsule 0     Sig: TAKE 1 CAPSULE (60 MG) BY MOUTH DAILY     "   Serotonin-Norepinephrine Reuptake Inhibitors  Passed - 2/17/2023  2:14 PM        Passed - Blood pressure under 140/90 in past 12 months     BP Readings from Last 3 Encounters:   11/03/22 125/86   07/28/22 120/80   01/18/22 136/82                 Passed - Recent (12 mo) or future (30 days) visit within the authorizing provider's specialty     Patient has had an office visit with the authorizing provider or a provider within the authorizing providers department within the previous 12 mos or has a future within next 30 days. See \"Patient Info\" tab in inbasket, or \"Choose Columns\" in Meds & Orders section of the refill encounter.              Passed - Medication is active on med list        Passed - Patient is age 18 or older             Amber Queen RN 02/18/23 6:07 PM  "

## 2023-02-20 ENCOUNTER — TELEPHONE (OUTPATIENT)
Dept: FAMILY MEDICINE | Facility: CLINIC | Age: 53
End: 2023-02-20
Payer: COMMERCIAL

## 2023-02-20 NOTE — TELEPHONE ENCOUNTER
.Prior Authorization Retail Medication Request    Medication/Dose: Testoseterone Cypionate requires a Prior Auth  ICD code (if different than what is on RX):  N/A  Previously Tried and Failed:  N/A  Rationale:  N/A    Insurance Name:  Commercial  Insurance ID:  [id number]      Pharmacy Information (if different than what is on RX)  Name:  [facility] [place]  Phone:  [phone]

## 2023-02-23 NOTE — TELEPHONE ENCOUNTER
Prior Authorization Approval    Authorization Effective Date: 2/23/2023  Authorization Expiration Date: 2/22/2024  Medication: testosterone cypionate (DEPOTESTOSTERONE) 200 MG/ML injection - [place] APPROVED  Insurance Company: [address] - Phone [phone] Fax [phone]  Which Pharmacy is filling the prescription (Not needed for infusion/clinic administered): [facility] [address]  Pharmacy Notified: Yes  Patient Notified: Yes (pharmacy will notify patient when ready)

## 2023-02-23 NOTE — TELEPHONE ENCOUNTER
Central Prior Authorization Team   Phone: [phone]      PA Initiation    Medication: testosterone cypionate (DEPOTESTOSTERONE) 200 MG/ML injection - [place] INITIATED  Insurance Company: [employer] - Phone [phone] Fax [phone]  Pharmacy Filling the Rx: [place], [place] - [address]  Filling Pharmacy Phone: [phone]  Filling Pharmacy Fax:    Start Date: 2/23/2023

## 2023-03-20 ENCOUNTER — TELEPHONE (OUTPATIENT)
Dept: SLEEP MEDICINE | Facility: CLINIC | Age: 53
End: 2023-03-20
Payer: COMMERCIAL

## 2023-03-20 NOTE — TELEPHONE ENCOUNTER
Attempted to call pt to schedule Daytime PSG.  Voice mail was left with best days and times to reach me directly yo schedule PSG.    ELIA Bautista, Clinical Specialist - [place]

## 2023-03-22 ASSESSMENT — PATIENT HEALTH QUESTIONNAIRE - PHQ9: SUM OF ALL RESPONSES TO PHQ QUESTIONS 1-9: 18

## 2023-03-24 ENCOUNTER — TELEPHONE (OUTPATIENT)
Dept: BEHAVIORAL HEALTH | Facility: CLINIC | Age: 53
End: 2023-03-24
Payer: COMMERCIAL

## 2023-03-24 ENCOUNTER — MYC MEDICAL ADVICE (OUTPATIENT)
Dept: BEHAVIORAL HEALTH | Facility: CLINIC | Age: 53
End: 2023-03-24
Payer: COMMERCIAL

## 2023-03-24 NOTE — TELEPHONE ENCOUNTER
[facility] Field Agent PHQ-9 Follow-up  Behavioral Health Clinician Triage Service    Field Agent PHQ-9 Responses:  [facility] Follow-up to PHQ 7/28/2022 3/22/2023 3/22/2023   PHQ-9 9. Suicide Ideation past 2 weeks Not at all Several days Several days   Thoughts of suicide or self harm in past 2 weeks - - No   Thoughts of suicide or self harm in past 2 weeks - No No   PHQ-9 Safety concerns? - - No   PHQ-9 Safety concerns? - No No        1st Outreach Date: March 24, 2023 Time: 9:20 am  Outcome: Left a message for patient to call [facility].  If patient doesn't return the call the [facility] Pool will make one more phone attempt within 24 hours.      9:23 am - [facility] sent myTomorrows Message.  10:01 am - Patient responded to message indicating they are safe, denying safety concerns. Patient states they rescheduled their PCP appointment for early April, 2023.  11:30 am - [facility] responded to patient with crisis resources  12:10 pm - PCP responded to message indicating no concern of risk issues/safety concerns.     Due to available information, patient does not appear to be at imminent risk to themselves/others. A welfare check does not appear indicated at this time. Rationale includes patient's response to Field Agent message indicating they are safe, chart review which does not show history of risk issues, PCP stating they do not have concern, and patient reading follow-up message with crisis resources. Future-orientation was noted in Patient message - he indicates planning to attend PCP appointment in early April. Patient was encouraged in Field Agent message to call [facility] for immediate support as needed.     Clarke Reaves LP    Hi my name is Clarke.  I m calling from  [employer] to follow-up on a questionnaire you completed on Field Agent.      If it s ok I d like review a few of your symptoms/responses and a talk briefly  about how you have been doing to see if I might be able to provide some support and guidance.       Address/Location of patient: at home  Have any  supportive people near them? unknown    Risk Assessment:  Patient denies current or recent suicidal ideation or behaviors.  Patient denies current or recent homicidal ideation or behaviors.  Patient denies current or recent self injurious behavior or ideation.  Patient denies other safety concerns.      Disposition:    - Recommendations / Safety Plan: A safety and risk management plan has not been developed at this time, however patient was encouraged to call [facility] / 911 should there be a change in any of these risk factors. Patient also sent crisis resources via [employer].       Clarke Reaves Psy.D, LP   Behavioral Health Clinician   [facility]

## 2023-04-06 ENCOUNTER — E-VISIT (OUTPATIENT)
Dept: FAMILY MEDICINE | Facility: CLINIC | Age: 53
End: 2023-04-06
Payer: COMMERCIAL

## 2023-04-06 DIAGNOSIS — M54.50 LUMBAR PAIN: Primary | ICD-10-CM

## 2023-04-06 PROCEDURE — 99421 OL DIG E/M SVC 5-10 MIN: CPT | Performed by: NURSE PRACTITIONER

## 2023-04-06 RX ORDER — CYCLOBENZAPRINE HCL 10 MG
10 TABLET ORAL 3 TIMES DAILY PRN
Qty: 30 TABLET | Refills: 0 | Status: SHIPPED | OUTPATIENT
Start: 2023-04-06 | End: 2023-04-21

## 2023-04-06 NOTE — PATIENT INSTRUCTIONS
Thank you for choosing us for your care. I have placed an order for a prescription so that you can start treatment. View your full visit summary for details by clicking on the link below. Your pharmacist will able to address any questions you may have about the medication.      If you're not feeling better within 2-3 weeks please schedule an appointment.  You can schedule an appointment right here in [facility], or call [phone]  If the visit is for the same symptoms as your eVisit, we'll refund the cost of your eVisit if seen within seven days.    Caring for Your Back    You are not alone.    Low back pain is very common. Nearly half of all adults will have low back pain in any given year. The good news is that back pain is rarely a danger to your health. Most people can manage their back pain on their own. About half of people start feeling better within two weeks. In 9 out of 10 cases, low back pain goes away or no longer limits daily activity within 6 weeks.     Your outlook is good!     Your symptoms tell us that your low back pain is most likely not a danger to you. Most of the time we will not know the exact cause of low back pain, even if you see a doctor or have an MRI. However, treatment can still work without knowing the cause of the pain. Less than 1 in 100 people need surgery for their back pain.     What can I do about my low back pain?     There are three basic things you can do to ease low back pain and help it go away.     Use heat or cold packs.    Take medicine as directed.    Use positions, movements and exercises.    Using heat or cold packs:    Try cold packs or gentle heat to ease your pain.  Use whichever gives you the most relief. Apply the cold pack or heat for 15 minutes at a time, as often as needed.    Taking medicine:    If taking over-the-counter medicine:    Take ibuprofen (Advil, Motrin) 600 mg three times a day as needed for pain.  OR    Take Aleve (naproxen) 220 to 440 mg two  times a day as needed for pain. If your doctor prescribed a muscle relaxant (cyclobenzaprine 10 mg.):    Take   to 1 tablet at bedtime.    Do not drive when taking this medicine. This drug may make you sleepy.     Using positions, movements and exercises:    Research tells us that moving your joints and muscles can help you recover from back pain. Such activity should be simple and gentle. Use the positions below as well as walking to help relieve your pain. Try taking a short walk every 3 to 4 hours during the day. Walk for a few minutes inside your home or take longer walks outside, on a treadmill or at a mall. Slowly increase the amount of time you walk. Expect discomfort when you begin, but it should lessen as your back starts to heal. When your back feels better, walk daily to keep your back and body healthy.    Finding a position that is comfortable:    When your back pain is new, certain positions will ease your pain. Gently try each of the positions below until you find one that is helpful. Once you find a position of comfort, use it as often as you like when you are resting. You will recover faster if you combine rest with activity.    * Lie on your back with your legs bent. You can do this by placing a pillow under your knees or lie on the floor and rest your lower legs on the seat of a chair.  * Lie on your side with your knees bent and place a pillow between your knees.    Lie on your stomach over pillows.       When should I call my doctor?    Your back pain should improve over the first couple of weeks. As it improves, you should be able to return to your normal activities.  But call your doctor if:      You have a sudden change in your ability to control your bladder or bowels.    You begin to feel tingling in your groin or legs.    The pain spreads down your leg and into your foot.    Your toes, feet or leg muscles begin to feel weak.    You feel generally unwell or sick.    Your pain gets  worse.    If you are deaf or hard of hearing, please let us know. We provide many free services including sign language interpreters, oral interpreters, TTYs, telephone amplifiers, note takers and written materials.    For informational purposes only. Not to replace the advice of your health care provider. Copyright   2013 [facility]. All rights reserved. [employer] [id number] - 04/13.

## 2023-04-08 PROCEDURE — 96375 TX/PRO/DX INJ NEW DRUG ADDON: CPT

## 2023-04-08 PROCEDURE — 99285 EMERGENCY DEPT VISIT HI MDM: CPT | Mod: 25

## 2023-04-08 PROCEDURE — 96361 HYDRATE IV INFUSION ADD-ON: CPT

## 2023-04-08 PROCEDURE — 96374 THER/PROPH/DIAG INJ IV PUSH: CPT

## 2023-04-09 ENCOUNTER — APPOINTMENT (OUTPATIENT)
Dept: CT IMAGING | Facility: HOSPITAL | Age: 53
End: 2023-04-09
Attending: EMERGENCY MEDICINE
Payer: COMMERCIAL

## 2023-04-09 ENCOUNTER — HOSPITAL ENCOUNTER (EMERGENCY)
Facility: HOSPITAL | Age: 53
Discharge: HOME | End: 2023-04-09
Attending: EMERGENCY MEDICINE | Admitting: EMERGENCY MEDICINE
Payer: COMMERCIAL

## 2023-04-09 VITALS
HEART RATE: 107 BPM | DIASTOLIC BLOOD PRESSURE: 97 MMHG | SYSTOLIC BLOOD PRESSURE: 150 MMHG | RESPIRATION RATE: 25 BRPM | WEIGHT: 278 LBS | HEIGHT: 75 IN | OXYGEN SATURATION: 97 % | BODY MASS INDEX: 34.57 KG/M2 | TEMPERATURE: 97 F

## 2023-04-09 DIAGNOSIS — M54.50 ACUTE BILATERAL LOW BACK PAIN WITHOUT SCIATICA: ICD-10-CM

## 2023-04-09 LAB
ALBUMIN SERPL BCG-MCNC: 4.6 G/DL (ref 3.5–5.2)
ALBUMIN UR-MCNC: 30 MG/DL
ALP SERPL-CCNC: 125 U/L (ref 40–129)
ALT SERPL W P-5'-P-CCNC: 33 U/L (ref 10–50)
ANION GAP SERPL CALCULATED.3IONS-SCNC: 15 MMOL/L (ref 7–15)
APPEARANCE UR: CLEAR
AST SERPL W P-5'-P-CCNC: 27 U/L (ref 10–50)
BACTERIA #/AREA URNS HPF: ABNORMAL /HPF
BILIRUB SERPL-MCNC: 0.7 MG/DL
BILIRUB UR QL STRIP: NEGATIVE
BUN SERPL-MCNC: 21.9 MG/DL (ref 6–20)
CALCIUM SERPL-MCNC: 9.7 MG/DL (ref 8.6–10)
CHLORIDE SERPL-SCNC: 95 MMOL/L (ref 98–107)
COLOR UR AUTO: YELLOW
CREAT SERPL-MCNC: 1.27 MG/DL (ref 0.67–1.17)
CRP SERPL-MCNC: <3 MG/L
DEPRECATED HCO3 PLAS-SCNC: 24 MMOL/L (ref 22–29)
ERYTHROCYTE [DISTWIDTH] IN BLOOD BY AUTOMATED COUNT: 12.5 % (ref 10–15)
ERYTHROCYTE [SEDIMENTATION RATE] IN BLOOD BY WESTERGREN METHOD: 6 MM/HR (ref 0–15)
GFR SERPL CREATININE-BSD FRML MDRD: 67 ML/MIN/1.73M2
GLUCOSE SERPL-MCNC: 284 MG/DL (ref 70–99)
GLUCOSE UR STRIP-MCNC: 50 MG/DL
HCT VFR BLD AUTO: 55.8 % (ref 40–53)
HGB BLD-MCNC: 20.1 G/DL (ref 13.3–17.7)
HGB UR QL STRIP: NEGATIVE
HOLD SPECIMEN: NORMAL
HOLD SPECIMEN: NORMAL
HYALINE CASTS: 49 /LPF
KETONES UR STRIP-MCNC: ABNORMAL MG/DL
LEUKOCYTE ESTERASE UR QL STRIP: NEGATIVE
MCH RBC QN AUTO: 31.3 PG (ref 26.5–33)
MCHC RBC AUTO-ENTMCNC: 36 G/DL (ref 31.5–36.5)
MCV RBC AUTO: 87 FL (ref 78–100)
MUCOUS THREADS #/AREA URNS LPF: PRESENT /LPF
NITRATE UR QL: NEGATIVE
PH UR STRIP: 5.5 [PH] (ref 5–7)
PLATELET # BLD AUTO: 341 10E3/UL (ref 150–450)
POTASSIUM SERPL-SCNC: 4.2 MMOL/L (ref 3.4–5.3)
PROT SERPL-MCNC: 8 G/DL (ref 6.4–8.3)
RBC # BLD AUTO: 6.43 10E6/UL (ref 4.4–5.9)
RBC URINE: 1 /HPF
SODIUM SERPL-SCNC: 134 MMOL/L (ref 136–145)
SP GR UR STRIP: 1.04 (ref 1–1.03)
SQUAMOUS EPITHELIAL: <1 /HPF
UROBILINOGEN UR STRIP-MCNC: <2 MG/DL
WBC # BLD AUTO: 10.5 10E3/UL (ref 4–11)
WBC URINE: 2 /HPF

## 2023-04-09 PROCEDURE — 36415 COLL VENOUS BLD VENIPUNCTURE: CPT | Performed by: EMERGENCY MEDICINE

## 2023-04-09 PROCEDURE — 85027 COMPLETE CBC AUTOMATED: CPT | Performed by: EMERGENCY MEDICINE

## 2023-04-09 PROCEDURE — 81001 URINALYSIS AUTO W/SCOPE: CPT | Performed by: EMERGENCY MEDICINE

## 2023-04-09 PROCEDURE — 74176 CT ABD & PELVIS W/O CONTRAST: CPT

## 2023-04-09 PROCEDURE — 999N000104 CT LUMBAR SPINE RECONSTRUCTED

## 2023-04-09 PROCEDURE — 86140 C-REACTIVE PROTEIN: CPT | Performed by: EMERGENCY MEDICINE

## 2023-04-09 PROCEDURE — 80053 COMPREHEN METABOLIC PANEL: CPT | Performed by: EMERGENCY MEDICINE

## 2023-04-09 PROCEDURE — 93005 ELECTROCARDIOGRAM TRACING: CPT | Performed by: EMERGENCY MEDICINE

## 2023-04-09 PROCEDURE — 258N000003 HC RX IP 258 OP 636: Performed by: EMERGENCY MEDICINE

## 2023-04-09 PROCEDURE — 85652 RBC SED RATE AUTOMATED: CPT | Performed by: EMERGENCY MEDICINE

## 2023-04-09 PROCEDURE — 250N000011 HC RX IP 250 OP 636: Performed by: EMERGENCY MEDICINE

## 2023-04-09 RX ORDER — KETOROLAC TROMETHAMINE 15 MG/ML
15 INJECTION, SOLUTION INTRAMUSCULAR; INTRAVENOUS ONCE
Status: COMPLETED | OUTPATIENT
Start: 2023-04-09 | End: 2023-04-09

## 2023-04-09 RX ORDER — OXYCODONE HYDROCHLORIDE 5 MG/1
5 TABLET ORAL EVERY 6 HOURS PRN
Qty: 12 TABLET | Refills: 0 | Status: SHIPPED | OUTPATIENT
Start: 2023-04-09 | End: 2023-04-13

## 2023-04-09 RX ADMIN — HYDROMORPHONE HYDROCHLORIDE 1 MG: 1 INJECTION, SOLUTION INTRAMUSCULAR; INTRAVENOUS; SUBCUTANEOUS at 01:18

## 2023-04-09 RX ADMIN — SODIUM CHLORIDE 1000 ML: 9 INJECTION, SOLUTION INTRAVENOUS at 02:16

## 2023-04-09 RX ADMIN — KETOROLAC TROMETHAMINE 15 MG: 15 INJECTION, SOLUTION INTRAMUSCULAR; INTRAVENOUS at 01:17

## 2023-04-09 ASSESSMENT — ENCOUNTER SYMPTOMS
BACK PAIN: 1
NUMBNESS: 0
FEVER: 0
DYSURIA: 0

## 2023-04-09 ASSESSMENT — ACTIVITIES OF DAILY LIVING (ADL)
ADLS_ACUITY_SCORE: 35
ADLS_ACUITY_SCORE: 35

## 2023-04-09 NOTE — DISCHARGE INSTRUCTIONS
Follow-up in your primary care clinic this week.  Return with increasing pain, onset of fever, lower extremity weakness or bowel or bladder changes.

## 2023-04-09 NOTE — ED TRIAGE NOTES
Low back pain for 5 days has seen his PCP and has been taking flexril, ibuprofen, tylenol. States pain is worse tonight. Pain is in lower back and radiated to groin.      Triage Assessment     Row Name 04/08/23 2427       Skin Circulation/Temperature WDL    Skin Circulation/Temperature WDL WDL       Cardiac WDL    Cardiac WDL WDL

## 2023-04-09 NOTE — ED NOTES
Discussed discharge instructions and prescription with pt. Pt voices understanding. Pt left er ambulating with a steady gait with all belongings.

## 2023-04-09 NOTE — ED PROVIDER NOTES
EMERGENCY DEPARTMENT ENCOUNTER      NAME: Morris Christopher  AGE: 53 year old male  YOB: 1970  MRN: [id number]  EVALUATION DATE & TIME: 2023 12:00 AM    PCP: Blanca Silver    ED PROVIDER: Thuan Johnson M.D.      Chief Complaint   Patient presents with     Back Pain         FINAL IMPRESSION:  1. Acute bilateral low back pain without sciatica          ED COURSE & MEDICAL DECISION MAKIN:31 AM I met with the patient, obtained history, performed an initial exam, and discussed options and plan for diagnostics and treatment here in the ED.   3:35 AM I rechecked and updated the patient. He is feeling much better   4:07 AM We discussed the plan for discharge and the patient is agreeable. Reviewed supportive cares, symptomatic treatment, outpatient follow up, and reasons to return to the Emergency Department. All questions and concerns were addressed. Patient to be discharged by ED RN.        Pertinent Labs & Imaging studies reviewed. (See chart for details)     53-year-old male here for evaluation of low back pain worsening over the last 5 days.  Exam significant for an extremely uncomfortable appearing individual who is diaphoretic with tenderness to palpation of the bilateral lower lumbar paraspinous musculature without palpable spasm.  No significant tenderness to palpation at the midline of the lumbar spine.  No red flags on exam or in history to suggest new cauda equina compression, epidural abscess, epidural hematoma, discitis, osteomyelitis, or new mass lesion.  Pain does radiate to the groin increasing concern for referred intra-abdominal pathology such as ureteral stone or muscle belly hematoma.  Broad laboratory evaluation obtained returns reassuring today.  CT imaging of the abdomen and pelvis as well as recons of the lumbar spine obtained and without worrisome findings.  Urinalysis without elevated markers of infection or blood.  After Toradol and Dilaudid, patient's pain improved  significantly down to a 1 out of 10.  Heart rate improved into the 90s.  Clinically this is all consistent with musculoskeletal low back pain.  I will give him a prescription for oxycodone and have him follow-up with primary care clinic this week with clear return precautions discussed.  Discharged in stable condition.    At the conclusion of the encounter I discussed the results of all of the tests and the disposition. The questions were answered and return precautions provided. The patient or family acknowledged understanding and was agreeable with the care plan.         Medical Decision Making    History:    Supplemental history from: Documented in chart, if applicable    External Record(s) reviewed: Other: Office visit on 3/23/23    Work Up:    Chart documentation includes differential considered and any EKGs or imaging independently interpreted by provider, where specified.    In additional to work up documented, I considered the following work up: Documented in chart, if applicable. and Imaging MRI, but deferred due to Normal CRP and ESR and limited concern for infectious process or cauda equina.    External consultation:    Discussion of management with another provider: Documented in chart, if applicable    Complicating factors:    Care impacted by chronic illness: Diabetes    Care affected by social determinants of health: N/A    Disposition considerations: Discharge. I prescribed additional prescription strength medication(s) as charted. N/A.      MEDICATIONS GIVEN IN THE EMERGENCY:  Medications   HYDROmorphone (DILAUDID) injection 1 mg (1 mg Intravenous $Given 4/9/23 0118)   ketorolac (TORADOL) injection 15 mg (15 mg Intravenous $Given 4/9/23 0117)   0.9% sodium chloride BOLUS (0 mLs Intravenous Stopped 4/9/23 0315)       NEW PRESCRIPTIONS STARTED AT TODAY'S ER VISIT:  New Prescriptions    OXYCODONE (ROXICODONE) 5 MG TABLET    Take 1 tablet (5 mg) by mouth every 6 hours as needed for pain       "    =================================================================    HPI    Patient information was obtained from: patient     Use of : N/A          Morris Christopher is a 53 year old male with a pertinent history of DMII who presents to this ED via walk in for evaluation of back pain.    For the last five days, the patient endorses lower back pain. He had this pain before one year ago and was prescribed flexril. The patient has been taking flexril, ibuprofen, and tylenol for the last few days with no relief of pain. He notes this evening around 10pm he suddenly awoke from his sleep with severe back pain radiating into his groin. His pain has never been this severe. The patient notes the pain is better when standing but worse when laying down. The patient also endorses urinary urgency. No dysuria. He denies any numbness, fever, or any other complaints at this time.     REVIEW OF SYSTEMS   Review of Systems   Constitutional: Negative for fever.   Genitourinary: Positive for urgency. Negative for dysuria.   Musculoskeletal: Positive for back pain.   Neurological: Negative for numbness.   All other systems reviewed and are negative.       PAST MEDICAL HISTORY:  Past Medical History:   Diagnosis Date     Arthritis 2004     Depression 2019     Diabetes mellitus (H) 2019     Prediabetes 11/2/2018       PAST SURGICAL HISTORY:  Past Surgical History:   Procedure Laterality Date     ARTHROSCOPY KNEE      bilateral      ARTHROSCOPY SHOULDER  2016     TONSILLECTOMY         CURRENT MEDICATIONS:    No current facility-administered medications for this encounter.    Current Outpatient Medications:      oxyCODONE (ROXICODONE) 5 MG tablet, Take 1 tablet (5 mg) by mouth every 6 hours as needed for pain, Disp: 12 tablet, Rfl: 0     alcohol swab prep pads, Use to swab area of injection/mark as directed., Disp: 300 each, Rfl: 3     BD HYPODERMIC NEEDLE 25G X 1-1/2\" MISC, USE ONE NEEDLE EVERY 14 DAYS, Disp: 6 each, Rfl: " "3     blood glucose (ACCU-CHEK GUIDE) test strip, Use to test blood sugar 2 times daily or as directed., Disp: 100 strip, Rfl: 11     blood glucose monitoring (FREESTYLE) lancets, Use to test blood sugar 3 times daily., Disp: 300 each, Rfl: 3     cyclobenzaprine (FLEXERIL) 10 MG tablet, Take 1 tablet (10 mg) by mouth 3 times daily as needed for muscle spasms, Disp: 30 tablet, Rfl: 0     dulaglutide (TRULICITY) 1.5 MG/0.5ML pen, Inject 1.5 mg Subcutaneous every 7 days for 90 days INJECT THE CONTENTS OF ONE SYRINGE UNDER THE SKIN EVERY 7 DAYS Strength: 1.5 MG/0.5ML, Disp: 6 mL, Rfl: 3     DULoxetine (CYMBALTA) 30 MG capsule, Take 1 tablet by mouth daily along with a 60 mg tablet for a total of 90 mg daily, Disp: 90 capsule, Rfl: 3     DULoxetine (CYMBALTA) 60 MG capsule, TAKE 1 CAPSULE (60 MG) BY MOUTH DAILY, Disp: 90 capsule, Rfl: 1     EPINEPHrine (ANY BX GENERIC EQUIV) 0.3 MG/0.3ML injection 2-pack, Inject 0.3 mg into the muscle as needed, Disp: , Rfl:      glipiZIDE (GLUCOTROL) 10 MG tablet, Take 1 tablet (10 mg) by mouth 2 times daily (before meals), Disp: 180 tablet, Rfl: 3     ondansetron (ZOFRAN ODT) 4 MG ODT tab, Take 1 tablet (4 mg) by mouth every 8 hours as needed for nausea, Disp: 10 tablet, Rfl: 1     Syringe/Needle, Disp, 18G X 1-1/2\" 3 ML MISC, Use 1 syringe every 14 days, Disp: 6 each, Rfl: 3     testosterone cypionate (DEPOTESTOSTERONE) 200 MG/ML injection, INJECT 0.9 MLS (180 MG) INTO THE MUSCLE EVERY 14 DAYS, Disp: 2 mL, Rfl: 5    ALLERGIES:  Allergies   Allergen Reactions     Bee Venom Anaphylaxis     Lorazepam        FAMILY HISTORY:  Family History   Problem Relation Age of Onset     Lupus Mother      Fibromyalgia Mother      Kidney Disease Mother      Clotting Disorder Mother      Depression Mother      Early Death Mother      Hypertension Father      Coronary Artery Disease Father      Diabetes Father      Arthritis Father      No Known Problems Sister      No Known Problems Brother      " "Diabetes Maternal Grandmother      Alcoholism Maternal Grandmother      Prostate Cancer Maternal Grandfather      Alcoholism Maternal Grandfather      Cancer Maternal Grandfather      Colon Cancer Paternal Grandmother      Arthritis Paternal Grandmother      Cancer Paternal Grandmother      Hypertension Paternal Grandmother      Substance Abuse Maternal Aunt        SOCIAL HISTORY:   Social History     Socioeconomic History     Marital status:    Tobacco Use     Smoking status: Former     Smokeless tobacco: Current     Types: Chew       VITALS:    Vitals: /83   Pulse 96   Temp 97  F (36.1  C) (Temporal)   Resp 14   Ht 1.905 m (6' 3\")   Wt 126.1 kg (278 lb)   SpO2 95%   BMI 34.75 kg/m       PHYSICAL EXAM:    Constitutional: Well developed, Well nourished, NAD   Head: atraumatic  Eyes: PERRL, EOMI, Conjunctiva normal, No discharge.   Respiratory: Normal breath sounds, No respiratory distress, Speaks full sentences easily.   Cardiovascular: Normal heart rate, Regular rhythm    GI: Soft, No tenderness, No flank tenderness. No rebound or guarding.  Musculoskeletal: 2+ DP pulses. No edema.    Back: Tenderness to palpation along the bilateral lower lumbar paraspinous musculature without spasm, no tenderness to palpation over the midline of the thoracic or lumbar spines  Integument: Warm, Dry, No erythema, No rash.    Neurologic: Alert & oriented x 3, Normal motor function, No focal deficits noted.    Psychiatric: Affect normal, Judgment normal, Mood normal.       LAB:  All pertinent labs reviewed and interpreted.  Labs Ordered and Resulted from Time of ED Arrival to Time of ED Departure   CBC WITH PLATELETS - Abnormal       Result Value    WBC Count 10.5      RBC Count 6.43 (*)     Hemoglobin 20.1 (*)     Hematocrit 55.8 (*)     MCV 87      MCH 31.3      MCHC 36.0      RDW 12.5      Platelet Count 341     COMPREHENSIVE METABOLIC PANEL - Abnormal    Sodium 134 (*)     Potassium 4.2      Chloride 95 (*)  "    Carbon Dioxide (CO2) 24      Anion Gap 15      Urea Nitrogen 21.9 (*)     Creatinine 1.27 (*)     Calcium 9.7      Glucose 284 (*)     Alkaline Phosphatase 125      AST 27      ALT 33      Protein Total 8.0      Albumin 4.6      Bilirubin Total 0.7      GFR Estimate 67     ROUTINE UA WITH MICROSCOPIC REFLEX TO CULTURE - Abnormal    Color Urine Yellow      Appearance Urine Clear      Glucose Urine 50 (*)     Bilirubin Urine Negative      Ketones Urine Trace (*)     Specific Gravity Urine 1.038 (*)     Blood Urine Negative      pH Urine 5.5      Protein Albumin Urine 30 (*)     Urobilinogen Urine <2.0      Nitrite Urine Negative      Leukocyte Esterase Urine Negative      Bacteria Urine Few (*)     Mucus Urine Present (*)     RBC Urine 1      WBC Urine 2      Squamous Epithelials Urine <1      Hyaline Casts Urine 49 (*)    ERYTHROCYTE SEDIMENTATION RATE AUTO - Normal    Erythrocyte Sedimentation Rate 6     CRP INFLAMMATION - Normal    CRP Inflammation <3.00         RADIOLOGY:  CT Lumbar Spine Reconstructed   Final Result   IMPRESSION:   1.  Transitional lumbosacral anatomy. There is sacralization of L5.    2.  No fracture or posttraumatic subluxation.   3.  Degenerative changes, as above.   4.  Circumferential wall thickening involving the bladder, nonspecific. Correlation for infectious/inflammatory cystitis or chronic outlet obstruction is recommended.      Abd/pelvis CT no contrast - Stone Protocol   Final Result   IMPRESSION:    1.  No urinary calculi or hydronephrosis.      2.  No appendicitis.      3.  Small fat-containing right inguinal hernia.             EKG:   Performed at: 00:05  Impression: Sinus tachycardia, non specific ST and T wave abnormalities   Rate: 129  Rhythm: Sinus tachycardia   QRS Interval: 78  QTc Interval: 416  Comparison: No prior for comparison   I have independently reviewed and interpreted the EKG(s) documented above.           Elizabeth STEVENS, am serving as a scribe to document  services personally performed by Dr. Thuan Johnson  based on my observation and the provider's statements to me. I, Thuan Johnson MD attest that Elizabeth White is acting in a scribe capacity, has observed my performance of the services and has documented them in accordance with my direction.      Thuan Johnson M.D.  Emergency Medicine  [facility] EMERGENCY DEPARTMENT  [address] [place] [id number] [phone]  Dept: [phone]     Thuan Johnson MD  04/09/23 0427

## 2023-04-11 LAB
ATRIAL RATE - MUSE: 129 BPM
DIASTOLIC BLOOD PRESSURE - MUSE: NORMAL MMHG
INTERPRETATION ECG - MUSE: NORMAL
P AXIS - MUSE: 53 DEGREES
PR INTERVAL - MUSE: 162 MS
QRS DURATION - MUSE: 78 MS
QT - MUSE: 284 MS
QTC - MUSE: 416 MS
R AXIS - MUSE: 46 DEGREES
SYSTOLIC BLOOD PRESSURE - MUSE: NORMAL MMHG
T AXIS - MUSE: 52 DEGREES
VENTRICULAR RATE- MUSE: 129 BPM

## 2023-04-11 ASSESSMENT — ENCOUNTER SYMPTOMS
CHILLS: 0
FEVER: 0
JOINT SWELLING: 1
NERVOUS/ANXIOUS: 0
MYALGIAS: 1
PARESTHESIAS: 0
HEADACHES: 1
ABDOMINAL PAIN: 0
DYSURIA: 0
FREQUENCY: 0
SORE THROAT: 0
DIARRHEA: 0
EYE PAIN: 0
PALPITATIONS: 0
WEAKNESS: 0
COUGH: 0
HEMATOCHEZIA: 0
HEARTBURN: 0
DIZZINESS: 0
NAUSEA: 0
HEMATURIA: 0
ARTHRALGIAS: 1
SHORTNESS OF BREATH: 0
CONSTIPATION: 0

## 2023-04-11 ASSESSMENT — PATIENT HEALTH QUESTIONNAIRE - PHQ9
SUM OF ALL RESPONSES TO PHQ QUESTIONS 1-9: 18
10. IF YOU CHECKED OFF ANY PROBLEMS, HOW DIFFICULT HAVE THESE PROBLEMS MADE IT FOR YOU TO DO YOUR WORK, TAKE CARE OF THINGS AT HOME, OR GET ALONG WITH OTHER PEOPLE: SOMEWHAT DIFFICULT

## 2023-04-13 ENCOUNTER — OFFICE VISIT (OUTPATIENT)
Dept: FAMILY MEDICINE | Facility: CLINIC | Age: 53
End: 2023-04-13
Payer: COMMERCIAL

## 2023-04-13 VITALS
TEMPERATURE: 97.7 F | DIASTOLIC BLOOD PRESSURE: 80 MMHG | BODY MASS INDEX: 36.1 KG/M2 | HEART RATE: 108 BPM | SYSTOLIC BLOOD PRESSURE: 147 MMHG | RESPIRATION RATE: 24 BRPM | HEIGHT: 75 IN | WEIGHT: 290.3 LBS | OXYGEN SATURATION: 98 %

## 2023-04-13 DIAGNOSIS — M54.50 LUMBAR PAIN: Primary | ICD-10-CM

## 2023-04-13 DIAGNOSIS — K40.90 RIGHT INGUINAL HERNIA: ICD-10-CM

## 2023-04-13 DIAGNOSIS — M51.369 BULGING LUMBAR DISC: ICD-10-CM

## 2023-04-13 PROCEDURE — 99213 OFFICE O/P EST LOW 20 MIN: CPT | Performed by: FAMILY MEDICINE

## 2023-04-13 RX ORDER — OXYCODONE HYDROCHLORIDE 5 MG/1
5 TABLET ORAL EVERY 6 HOURS PRN
Qty: 28 TABLET | Refills: 0 | Status: SHIPPED | OUTPATIENT
Start: 2023-04-13 | End: 2023-08-03

## 2023-04-13 RX ORDER — GABAPENTIN 100 MG/1
200 CAPSULE ORAL 3 TIMES DAILY
Qty: 180 CAPSULE | Refills: 3 | Status: SHIPPED | OUTPATIENT
Start: 2023-04-13 | End: 2023-11-13

## 2023-04-13 RX ORDER — METHOCARBAMOL 500 MG/1
500 TABLET, FILM COATED ORAL 3 TIMES DAILY
Qty: 30 TABLET | Refills: 1 | Status: SHIPPED | OUTPATIENT
Start: 2023-04-13 | End: 2023-08-03

## 2023-04-13 ASSESSMENT — PAIN SCALES - GENERAL: PAINLEVEL: MODERATE PAIN (4)

## 2023-04-13 NOTE — LETTER
April 13, 2023      Morris Christopher  [address] [id number] [place] [address]        To Whom It May Concern:    Morris Christopher was seen in our clinic. He may return to work with the following: limited to light duty - lifting no greater than 10 pounds, no bending/stooping, no climbing, and periodic breaks for rest as needed on or about 4/14/23 until he sees neurosurgeon on 4/21/23.      Sincerely,        Blanca Silver MD

## 2023-04-13 NOTE — PROGRESS NOTES
"  Assessment & Plan     Lumbar pain  Reviewed [facility].  Provided refill of oxycodone 5 mg tablets so that he can continue to take 1 tablet twice daily until he is seen by neurosurgeon next week.  Continue duloxetine.  Provided prescription for methocarbamol in place of cyclobenzaprine.  Continue to alternate Tylenol and ibuprofen during the day.  Continue gabapentin at night.  Refill provided.  Note provided for work restrictions.  These will be in place until he is seen by neurosurgery next week.  - oxyCODONE (ROXICODONE) 5 MG tablet  Dispense: 28 tablet; Refill: 0  - methocarbamol (ROBAXIN) 500 MG tablet  Dispense: 30 tablet; Refill: 1  - gabapentin (NEURONTIN) 100 MG capsule  Dispense: 180 capsule; Refill: 3    Bulging lumbar disc  See #1 above  - oxyCODONE (ROXICODONE) 5 MG tablet  Dispense: 28 tablet; Refill: 0  - gabapentin (NEURONTIN) 100 MG capsule  Dispense: 180 capsule; Refill: 3    Right inguinal hernia  Recommend that he see a general surgeon to discuss repair once current flareup of back pain has improved               BMI:   Estimated body mass index is 36.29 kg/m  as calculated from the following:    Height as of this encounter: 1.905 m (6' 3\").    Weight as of this encounter: 131.7 kg (290 lb 4.8 oz).           Blanca Silver MD  [facility] SUZIE Farah is a 53 year old, presenting for the following health issues:  Follow Up (Back pain)        4/13/2023     2:29 PM   Additional Questions   Roomed by Genevieve Hogan   Accompanied by Self     HPI   He is seen today for emergency department follow-up visit.  He was seen in the emergency department on April 8 for lower back pain that had been worsening over the previous 5 days.  He has history of chronic lower back pain.  Was not able to manage the pain at home and was in extreme discomfort so presented to the emergency department for further evaluation and treatment.  Received Toradol and Dilaudid and pain improved " "significantly.  CT scan of the lumbar spine was performed which showed multilevel degenerative changes, disc bulging at L2-L3, L3-L4 and L4-L5.  He was given a prescription for oxycodone for pain management and advised to follow-up with primary care.    Incidental right inguinal hernia also noted on CT scan.  He has not noticed any symptoms of concern related to this    Today he reports that his pain is still present and severe but has been manageable with his current regimen.  He is currently taking 5 mg of oxycodone in the morning and at bedtime.  During the day he is alternating 1000 mg of Tylenol and 800 mg of ibuprofen.  He is also taking duloxetine in the morning and Flexeril twice daily.  In the past Flexeril has worked well for him but he is not getting much relief with the Flexeril with this current flareup of pain and is wanting to try Robaxin as an alternative.  He had some gabapentin at home that he had previously been prescribed so is also started taking 200 mg of that at bedtime.  He would like to continue this pain medication regimen until he is seen on Monday by neurosurgery.  He will need refills of oxycodone and gabapentin and would like a prescription for Robaxin.  He is also requesting a note for work restrictions.  He would like to continue to work but requests light duty.  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    No other concerns or questions today.          Review of Systems         Objective    BP (!) 147/80   Pulse 108   Temp 97.7  F (36.5  C)   Resp 24   Ht 1.905 m (6' 3\")   Wt 131.7 kg (290 lb 4.8 oz)   SpO2 98%   BMI 36.29 kg/m    Body mass index is 36.29 kg/m .  Physical Exam   GENERAL: healthy, alert and no distress  PSYCH: mentation appears normal                    Answers for HPI/ROS submitted by the patient on 4/11/2023  If you checked off any problems, how difficult have these " problems made it for you to do your work, take care of things at home, or get along with other people?: Somewhat difficult  PHQ9 TOTAL SCORE: 18

## 2023-04-14 PROBLEM — K40.90 RIGHT INGUINAL HERNIA: Status: ACTIVE | Noted: 2023-04-14

## 2023-04-17 ENCOUNTER — PRE VISIT (OUTPATIENT)
Dept: NEUROSURGERY | Facility: CLINIC | Age: 53
End: 2023-04-17
Payer: COMMERCIAL

## 2023-04-17 NOTE — TELEPHONE ENCOUNTER
NEUROSURGERY- NEW PREVISIT PLANNING       Record Status/Location     Referring Provider Epic ED   Diagnosis In 4/9/23 hospital note Acute bilateral low back pain w/o sciatica   MRI (HEAD, NECK, SPINE)  NO   CT Epic 4/9/23   X-ray  NO   INJECTION  NO   PHYSICAL THERAPY  NO   SURGERY  NO

## 2023-04-21 ENCOUNTER — OFFICE VISIT (OUTPATIENT)
Dept: NEUROSURGERY | Facility: CLINIC | Age: 53
End: 2023-04-21
Payer: COMMERCIAL

## 2023-04-21 ENCOUNTER — HOSPITAL ENCOUNTER (OUTPATIENT)
Dept: RADIOLOGY | Facility: HOSPITAL | Age: 53
Discharge: HOME | End: 2023-04-21
Attending: SURGERY
Payer: COMMERCIAL

## 2023-04-21 ENCOUNTER — HOSPITAL ENCOUNTER (OUTPATIENT)
Dept: MRI IMAGING | Facility: HOSPITAL | Age: 53
Discharge: HOME | End: 2023-04-21
Attending: SURGERY
Payer: COMMERCIAL

## 2023-04-21 VITALS
BODY MASS INDEX: 36.06 KG/M2 | OXYGEN SATURATION: 95 % | HEART RATE: 109 BPM | HEIGHT: 75 IN | SYSTOLIC BLOOD PRESSURE: 146 MMHG | DIASTOLIC BLOOD PRESSURE: 95 MMHG | WEIGHT: 290 LBS

## 2023-04-21 DIAGNOSIS — M54.9 BACK PAIN, UNSPECIFIED BACK LOCATION, UNSPECIFIED BACK PAIN LATERALITY, UNSPECIFIED CHRONICITY: ICD-10-CM

## 2023-04-21 DIAGNOSIS — M54.9 BACK PAIN, UNSPECIFIED BACK LOCATION, UNSPECIFIED BACK PAIN LATERALITY, UNSPECIFIED CHRONICITY: Primary | ICD-10-CM

## 2023-04-21 PROCEDURE — 99203 OFFICE O/P NEW LOW 30 MIN: CPT | Performed by: SURGERY

## 2023-04-21 PROCEDURE — 255N000002 HC RX 255 OP 636: Performed by: SURGERY

## 2023-04-21 PROCEDURE — 72158 MRI LUMBAR SPINE W/O & W/DYE: CPT

## 2023-04-21 PROCEDURE — 72114 X-RAY EXAM L-S SPINE BENDING: CPT

## 2023-04-21 PROCEDURE — A9585 GADOBUTROL INJECTION: HCPCS | Performed by: SURGERY

## 2023-04-21 RX ORDER — GADOBUTROL 604.72 MG/ML
13 INJECTION INTRAVENOUS ONCE
Status: COMPLETED | OUTPATIENT
Start: 2023-04-21 | End: 2023-04-21

## 2023-04-21 RX ADMIN — GADOBUTROL 13 ML: 604.72 INJECTION INTRAVENOUS at 21:42

## 2023-04-21 NOTE — NURSING NOTE
Neurosurgery consultation was requested by: ER  Pain: low back pain   Radicular Pain is present: wraps around to hips into groin on both sides   Lhermitte sign: no   Motor complaints:   Sensory complaints: numbness and tingling in both feet  Gait and balance issues: yes   Bowel or bladder issues: Denies   Duration of SX is: 3 weeks   The symptoms are worse with: bending and activity   The symptoms are better with:standing straight up   Injury: no particular injury   Severity is: acute   Patient has tried the following conservative measures: none   PACO score is: 54%  FARAZ Raza

## 2023-04-21 NOTE — PROGRESS NOTES
The patient is a 53-year-old male.  He is an ICU nurse at [employer].  He has had intermittent back pain since he was in his 20s.  He has an episode now of 3 weeks of back pain with radiation into the groin bilaterally and testicles bilaterally.  He was bad enough 2 weeks ago to go to the emergency room but he has subsequently improved somewhat.  On CT he has degenerative changes and bulging disks.  He also has circumferential wall thickening involving the bladder.  Correlation for infectious/inflammatory cystitis or chronic outlet obstruction recommended.  I would like to get some lumbar spine films with flexion-extension views.  Lumbar MRI with and without contrast.  Urology consult.  [place] [facility] consult and conservative treatment.  The patient is going to return for follow-up.  He is satisfied with the plan.  Total time 20 minutes, more than 50% spent counseling and/or coordinating care.   [facility] to [facility] faxed to our office along with imaging studies. Thanks.

## 2023-04-21 NOTE — LETTER
4/21/2023         RE: Morris Christopher  [address] [facility] [address]        Dear Colleague,    Thank you for referring your patient, Morris Christopher, to the [facility] SP[facility]OSURGERY. Please see a copy of my visit note below.      The patient is a 53-year-old male.  He is an ICU nurse at [employer].  He has had intermittent back pain since he was in his 20s.  He has an episode now of 3 weeks of back pain with radiation into the groin bilaterally and testicles bilaterally.  He was bad enough 2 weeks ago to go to the emergency room but he has subsequently improved somewhat.  On CT he has degenerative changes and bulging disks.  He also has circumferential wall thickening involving the bladder.  Correlation for infectious/inflammatory cystitis or chronic outlet obstruction recommended.  I would like to get some lumbar spine films with flexion-extension views.  Lumbar MRI with and without contrast.  Urology consult.  [facility] consult and conservative treatment.  The patient is going to return for follow-up.  He is satisfied with the plan.  Total time 20 minutes, more than 50% spent counseling and/or coordinating care.      Again, thank you for allowing me to participate in the care of your patient.        Sincerely,        Gabo Berrios MD

## 2023-04-24 ENCOUNTER — TELEPHONE (OUTPATIENT)
Dept: FAMILY MEDICINE | Facility: CLINIC | Age: 53
End: 2023-04-24

## 2023-04-24 NOTE — TELEPHONE ENCOUNTER
Patient is dropping off workablity slip for [employer].  When done please call patient and fax form to his job

## 2023-06-20 ENCOUNTER — TRANSFERRED RECORDS (OUTPATIENT)
Dept: HEALTH INFORMATION MANAGEMENT | Facility: CLINIC | Age: 53
End: 2023-06-20
Payer: COMMERCIAL

## 2023-06-20 LAB — RETINOPATHY: NEGATIVE

## 2023-08-12 ENCOUNTER — HEALTH MAINTENANCE LETTER (OUTPATIENT)
Age: 53
End: 2023-08-12

## 2023-08-16 ENCOUNTER — LAB (OUTPATIENT)
Dept: LAB | Facility: CLINIC | Age: 53
End: 2023-08-16
Payer: COMMERCIAL

## 2023-08-16 DIAGNOSIS — E11.9 DIABETES MELLITUS, TYPE 2 (H): ICD-10-CM

## 2023-08-16 DIAGNOSIS — E11.42 DIABETIC POLYNEUROPATHY ASSOCIATED WITH TYPE 2 DIABETES MELLITUS (H): ICD-10-CM

## 2023-08-16 DIAGNOSIS — E11.9 TYPE 2 DIABETES MELLITUS WITHOUT COMPLICATION, WITHOUT LONG-TERM CURRENT USE OF INSULIN (H): ICD-10-CM

## 2023-08-16 DIAGNOSIS — E55.9 VITAMIN D DEFICIENCY: ICD-10-CM

## 2023-08-16 DIAGNOSIS — Z12.5 SCREENING FOR PROSTATE CANCER: ICD-10-CM

## 2023-08-16 DIAGNOSIS — E29.1 HYPOGONADISM MALE: ICD-10-CM

## 2023-08-16 DIAGNOSIS — E78.5 HYPERLIPIDEMIA LDL GOAL <100: ICD-10-CM

## 2023-08-16 LAB
ALBUMIN SERPL BCG-MCNC: 4.3 G/DL (ref 3.5–5.2)
ALP SERPL-CCNC: 95 U/L (ref 40–129)
ALT SERPL W P-5'-P-CCNC: 33 U/L (ref 0–70)
ANION GAP SERPL CALCULATED.3IONS-SCNC: 11 MMOL/L (ref 7–15)
AST SERPL W P-5'-P-CCNC: 29 U/L (ref 0–45)
BILIRUB SERPL-MCNC: 0.7 MG/DL
BUN SERPL-MCNC: 22.5 MG/DL (ref 6–20)
CALCIUM SERPL-MCNC: 9.2 MG/DL (ref 8.6–10)
CHLORIDE SERPL-SCNC: 98 MMOL/L (ref 98–107)
CHOLEST SERPL-MCNC: 192 MG/DL
CREAT SERPL-MCNC: 1.04 MG/DL (ref 0.67–1.17)
CREAT UR-MCNC: 138 MG/DL
DEPRECATED CALCIDIOL+CALCIFEROL SERPL-MC: 15 UG/L (ref 20–75)
DEPRECATED HCO3 PLAS-SCNC: 26 MMOL/L (ref 22–29)
ERYTHROCYTE [DISTWIDTH] IN BLOOD BY AUTOMATED COUNT: 11.5 % (ref 10–15)
GFR SERPL CREATININE-BSD FRML MDRD: 85 ML/MIN/1.73M2
GLUCOSE SERPL-MCNC: 253 MG/DL (ref 70–99)
HBA1C MFR BLD: 8.1 % (ref 0–5.6)
HCT VFR BLD AUTO: 48.4 % (ref 40–53)
HDLC SERPL-MCNC: 56 MG/DL
HGB BLD-MCNC: 17.8 G/DL (ref 13.3–17.7)
LDLC SERPL CALC-MCNC: 79 MG/DL
MCH RBC QN AUTO: 32.3 PG (ref 26.5–33)
MCHC RBC AUTO-ENTMCNC: 36.8 G/DL (ref 31.5–36.5)
MCV RBC AUTO: 88 FL (ref 78–100)
MICROALBUMIN UR-MCNC: 12.6 MG/L
MICROALBUMIN/CREAT UR: 9.13 MG/G CR (ref 0–17)
NONHDLC SERPL-MCNC: 136 MG/DL
PLATELET # BLD AUTO: 243 10E3/UL (ref 150–450)
POTASSIUM SERPL-SCNC: 4.2 MMOL/L (ref 3.4–5.3)
PROT SERPL-MCNC: 6.8 G/DL (ref 6.4–8.3)
PSA SERPL DL<=0.01 NG/ML-MCNC: 0.26 NG/ML (ref 0–3.5)
RBC # BLD AUTO: 5.51 10E6/UL (ref 4.4–5.9)
SODIUM SERPL-SCNC: 135 MMOL/L (ref 136–145)
TRIGL SERPL-MCNC: 287 MG/DL
WBC # BLD AUTO: 8.4 10E3/UL (ref 4–11)

## 2023-08-16 PROCEDURE — 85027 COMPLETE CBC AUTOMATED: CPT

## 2023-08-16 PROCEDURE — 82570 ASSAY OF URINE CREATININE: CPT

## 2023-08-16 PROCEDURE — 83036 HEMOGLOBIN GLYCOSYLATED A1C: CPT

## 2023-08-16 PROCEDURE — 82043 UR ALBUMIN QUANTITATIVE: CPT

## 2023-08-16 PROCEDURE — 82306 VITAMIN D 25 HYDROXY: CPT

## 2023-08-16 PROCEDURE — 80061 LIPID PANEL: CPT

## 2023-08-16 PROCEDURE — G0103 PSA SCREENING: HCPCS

## 2023-08-16 PROCEDURE — 36415 COLL VENOUS BLD VENIPUNCTURE: CPT

## 2023-08-16 PROCEDURE — 80053 COMPREHEN METABOLIC PANEL: CPT

## 2023-08-16 PROCEDURE — 84403 ASSAY OF TOTAL TESTOSTERONE: CPT

## 2023-08-16 RX ORDER — DULOXETIN HYDROCHLORIDE 60 MG/1
60 CAPSULE, DELAYED RELEASE ORAL DAILY
Qty: 90 CAPSULE | Refills: 1 | Status: SHIPPED | OUTPATIENT
Start: 2023-08-16 | End: 2024-01-16

## 2023-08-16 NOTE — TELEPHONE ENCOUNTER
"Routing refill request to provider for review/approval because:  B/P out of range    Last Written Prescription Date:  2/18/23  Last Fill Quantity: 90,  # refills: 1   Last office visit provider:  3/23/23     Requested Prescriptions   Pending Prescriptions Disp Refills    DULoxetine (CYMBALTA) 60 MG capsule [Pharmacy Med Name: DULOXETINE HCL 60MG CPEP] 90 capsule 1     Sig: TAKE ONE CAPSULE BY MOUTH ONCE DAILY       Serotonin-Norepinephrine Reuptake Inhibitors  Failed - 8/16/2023  4:23 PM        Failed - Blood pressure under 140/90 in past 12 months     BP Readings from Last 3 Encounters:   04/21/23 (!) 146/95   04/13/23 (!) 147/80   04/09/23 (!) 150/97                 Passed - Recent (12 mo) or future (30 days) visit within the authorizing provider's specialty     Patient has had an office visit with the authorizing provider or a provider within the authorizing providers department within the previous 12 mos or has a future within next 30 days. See \"Patient Info\" tab in inbasket, or \"Choose Columns\" in Meds & Orders section of the refill encounter.              Passed - Medication is active on med list        Passed - Patient is age 18 or older             Julianne Guerrero RN 08/16/23 4:23 PM  "

## 2023-08-21 LAB — TESTOST SERPL-MCNC: 114 NG/DL (ref 240–950)

## 2023-09-11 ENCOUNTER — VIRTUAL VISIT (OUTPATIENT)
Dept: FAMILY MEDICINE | Facility: CLINIC | Age: 53
End: 2023-09-11
Payer: COMMERCIAL

## 2023-09-11 DIAGNOSIS — U07.1 INFECTION DUE TO 2019 NOVEL CORONAVIRUS: Primary | ICD-10-CM

## 2023-09-11 DIAGNOSIS — F32.0 MILD MAJOR DEPRESSION (H): ICD-10-CM

## 2023-09-11 DIAGNOSIS — E66.01 MORBID OBESITY (H): ICD-10-CM

## 2023-09-11 DIAGNOSIS — E11.42 DIABETIC POLYNEUROPATHY ASSOCIATED WITH TYPE 2 DIABETES MELLITUS (H): ICD-10-CM

## 2023-09-11 PROCEDURE — 99213 OFFICE O/P EST LOW 20 MIN: CPT | Mod: VID | Performed by: PHYSICIAN ASSISTANT

## 2023-09-11 NOTE — LETTER
September 11, 2023      Morris Shahyker  [address] [address]        To Whom It May Concern:    Morris Christopher was seen on 9/11/2023.  Please excuse him  hrough 9/13/23 due to illness.        Sincerely,        Salima Zhang PA-C

## 2023-09-11 NOTE — PROGRESS NOTES
"Gagan is a 53 year old who is being evaluated via a billable video visit.      How would you like to obtain your AVS? MyChart  If the video visit is dropped, the invitation should be resent by: Text to cell phone: [phone]  Will anyone else be joining your video visit? No      Assessment & Plan       ICD-10-CM    1. Infection due to 2019 novel coronavirus  U07.1 nirmatrelvir and ritonavir (PAXLOVID) 300 mg/100 mg therapy pack      2. Diabetic polyneuropathy associated with type 2 diabetes mellitus (H)  E11.42       3. Mild major depression (H)  F32.0       4. Morbid obesity (H)  E66.01           Discussed paxlovid - possible side effects, how the medication is expected to work.    We reviewed his medications to rule out possible interactions and verified normal kidney function so no dose reduction would be needed    BMI:   Estimated body mass index is 36.25 kg/m  as calculated from the following:    Height as of 4/21/23: 1.905 m (6' 3\").    Weight as of 4/21/23: 131.5 kg (290 lb).           NATHALIE Jimenez [facility]    Subjective   Gagan is a 53 year old, presenting for the following health issues:  Covid Concern        9/11/2023    11:35 AM   Additional Questions   Roomed by Bettie STONERID-19 Symptom Review  How many days ago did these symptoms start? 3 days     Are any of the following symptoms significant for you?  New or worsening difficulty breathing? No  Worsening cough? No  Fever or chills? Yes, the highest temperature was 102, with chills   Headache: YES  Sore throat: YES- scratchy   Chest pain: No  Diarrhea: YES- few times   Body aches? YES  Positive home covid test on 9/9/2023    What treatments has patient tried? Acetaminophen and Ibuprofen    Does patient live in a nursing home, group home, or shelter? No  Does patient have a way to get food/medications during quarantined? Yes, I have a friend or family member who can help me.    This is his 2nd confirmed case " although fairly positive he has had it before  He is an ICU nurse    The first time he had COVID he had a really bad cough  The headache and body aches are what is bothering him most this time  Denies SOB         Review of Systems   Remainder of ROS obtained and found to be negative other than that which was documented above        Objective           Vitals:  No vitals were obtained today due to virtual visit.    Physical Exam   GENERAL: Healthy, alert and no distress  EYES: Eyes grossly normal to inspection.  No discharge or erythema, or obvious scleral/conjunctival abnormalities.  RESP: No audible wheeze, cough, or visible cyanosis.  No visible retractions or increased work of breathing.    SKIN: Visible skin clear. No significant rash, abnormal pigmentation or lesions.  NEURO: Cranial nerves grossly intact.  Mentation and speech appropriate for age.  PSYCH: Mentation appears normal, affect normal/bright, judgement and insight intact, normal speech and appearance well-groomed.                Video-Visit Details    Type of service:  Video Visit     Originating Location (pt. Location): Home    Distant Location (provider location):  On-site  Platform used for Video Visit: Jorge    Poor video quality so after 6 min switched to telephone and finished in a telephone visit (video visit from [id number]). Total visit duration with telephone and documentation 21 minutes

## 2023-09-18 PROCEDURE — 99284 EMERGENCY DEPT VISIT MOD MDM: CPT | Mod: 25

## 2023-09-19 ENCOUNTER — APPOINTMENT (OUTPATIENT)
Dept: RADIOLOGY | Facility: HOSPITAL | Age: 53
End: 2023-09-19
Attending: EMERGENCY MEDICINE
Payer: COMMERCIAL

## 2023-09-19 ENCOUNTER — APPOINTMENT (OUTPATIENT)
Dept: CT IMAGING | Facility: HOSPITAL | Age: 53
End: 2023-09-19
Attending: EMERGENCY MEDICINE
Payer: COMMERCIAL

## 2023-09-19 ENCOUNTER — HOSPITAL ENCOUNTER (EMERGENCY)
Facility: HOSPITAL | Age: 53
Discharge: HOME | End: 2023-09-19
Attending: EMERGENCY MEDICINE | Admitting: EMERGENCY MEDICINE
Payer: COMMERCIAL

## 2023-09-19 VITALS
DIASTOLIC BLOOD PRESSURE: 92 MMHG | HEART RATE: 115 BPM | TEMPERATURE: 97.9 F | RESPIRATION RATE: 16 BRPM | HEIGHT: 76 IN | OXYGEN SATURATION: 94 % | WEIGHT: 283 LBS | BODY MASS INDEX: 34.46 KG/M2 | SYSTOLIC BLOOD PRESSURE: 153 MMHG

## 2023-09-19 DIAGNOSIS — S30.0XXA CONTUSION OF LOWER BACK, INITIAL ENCOUNTER: ICD-10-CM

## 2023-09-19 DIAGNOSIS — E11.42 DIABETIC POLYNEUROPATHY ASSOCIATED WITH TYPE 2 DIABETES MELLITUS (H): ICD-10-CM

## 2023-09-19 DIAGNOSIS — E11.9 TYPE 2 DIABETES MELLITUS WITHOUT COMPLICATION, WITHOUT LONG-TERM CURRENT USE OF INSULIN (H): ICD-10-CM
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
## 2023-09-19 PROBLEM — Z12.11 SCREEN FOR COLON CANCER: Status: ACTIVE | Noted: 2020-06-05

## 2023-09-19 PROBLEM — M51.369 BULGING LUMBAR DISC: Status: ACTIVE | Noted: 2023-05-01

## 2023-09-19 PROCEDURE — 250N000011 HC RX IP 250 OP 636: Mod: JZ | Performed by: EMERGENCY MEDICINE

## 2023-09-19 PROCEDURE — 72131 CT LUMBAR SPINE W/O DYE: CPT

## 2023-09-19 PROCEDURE — 72220 X-RAY EXAM SACRUM TAILBONE: CPT

## 2023-09-19 PROCEDURE — 96372 THER/PROPH/DIAG INJ SC/IM: CPT | Performed by: EMERGENCY MEDICINE

## 2023-09-19 RX ORDER — KETOROLAC TROMETHAMINE 30 MG/ML
60 INJECTION, SOLUTION INTRAMUSCULAR; INTRAVENOUS ONCE
Status: COMPLETED | OUTPATIENT
Start: 2023-09-19 | End: 2023-09-19

## 2023-09-19 RX ADMIN — KETOROLAC TROMETHAMINE 60 MG: 30 INJECTION, SOLUTION INTRAMUSCULAR; INTRAVENOUS at 01:27

## 2023-09-19 ASSESSMENT — ACTIVITIES OF DAILY LIVING (ADL): ADLS_ACUITY_SCORE: 35

## 2023-09-19 ASSESSMENT — ENCOUNTER SYMPTOMS: BACK PAIN: 1

## 2023-09-19 NOTE — ED PROVIDER NOTES
EMERGENCY DEPARTMENT ENCOUNTER      NAME: Morris Christopher  AGE: 53 year old male  YOB: 1970  MRN: [id number]  EVALUATION DATE & TIME: 2023 12:01 AM    PCP: Blanca Silver    ED PROVIDER: Sekou Herbert M.D.      Chief Complaint   Patient presents with    Fall         FINAL IMPRESSION:  1.  Acute fall.  2.  Acute low back contusion.      ED COURSE & MEDICAL DECISION MAKIN:27 AM I met with the patient to gather history and to perform my initial exam. We discussed plans for the ED course, including diagnostic testing and treatment. PPE worn: cloth mask.  4 days ago patient tripped over the dog and fell onto a trailer hitch is not complaining of pain in the low back, lumbar, sacral area.  No loss of bowel or bladder control.  No incomplete bladder emptying.  No perianal anesthesia, no numbness, tingling paresthesias or weakness.  1:17 AM.  No acute findings on lumbosacral coccyx imaging.  Patient will be discharged home.  We talked about ice, Tylenol or ibuprofen.  Patient denied need or desire for anything stronger.    Pertinent Labs & Imaging studies reviewed. (See chart for details)  53 year old male presents to the Emergency Department for evaluation of fall and low back pain.    At the conclusion of the encounter I discussed the results of all of the tests and the disposition. The questions were answered. The patient or family acknowledged understanding and was agreeable with the care plan.              Medical Decision Making    History:  Supplemental history from: Documented in chart, if applicable  External Record(s) reviewed: Both inpatient and outpatient [facility] records reviewed.    Work Up:  Chart documentation includes differential considered and any EKGs or imaging independently interpreted by provider, where specified.  Differential diagnosis includes contusion, muscle strain, fracture, etc.  In additional to work up documented, I considered the following work up: Documented  in chart, if applicable.    External consultation:  Discussion of management with another provider: Documented in chart, if applicable    Complicating factors:  Care impacted by chronic illness: Diabetes  Care affected by social determinants of health: Access to Medical Care    Disposition considerations: Anticipate probable discharge home with medications.  Patient drove to the hospital today.          MEDICATIONS GIVEN IN THE EMERGENCY:  Medications - No data to display    NEW PRESCRIPTIONS STARTED AT TODAY'S ER VISIT  New Prescriptions    No medications on file          =================================================================    HPI    Patient information was obtained from: patient    Use of : N/A       Morris Christopher is a 53 year old male with a pertinent history of DM II, diabetic polyneuropathy, morbid obesity, and depression who presents to this ED via walk-in for evaluation of tailbone pain.    Patient reports worsening pain on the left side of his lower spine after tripping over his dog (~88 lbs) and landing backwards onto a trailer hitch 4 days ago. Denies hitting his head or losing consciousness. He notes that he doesn't have much pain when standing still but his pain worsens with movement and getting into certain positions. He has tried icing/heat pads and tylenol without relief. He states his pain has worsened while at work today, which prompted him to the ED. Patient is not on blood thinners.    He does not identify any waxing or waning symptoms otherwise, exacerbating or alleviating features, associated symptoms except as mentioned.     REVIEW OF SYSTEMS   Review of Systems   Musculoskeletal:  Positive for back pain (tailbone pain).   All other systems reviewed and are negative.       PAST MEDICAL HISTORY:  Past Medical History:   Diagnosis Date    Arthritis 2004    Depression 2019    Diabetes mellitus (H) 2019    Prediabetes 11/2/2018       PAST SURGICAL HISTORY:  Past Surgical  "History:   Procedure Laterality Date    ARTHROSCOPY KNEE      bilateral     ARTHROSCOPY SHOULDER  2016    TONSILLECTOMY             CURRENT MEDICATIONS:    alcohol swab prep pads  BD HYPODERMIC NEEDLE 25G X 1-1/2\" MISC  blood glucose (ACCU-CHEK GUIDE) test strip  blood glucose monitoring (FREESTYLE) lancets  DULoxetine (CYMBALTA) 30 MG capsule  DULoxetine (CYMBALTA) 60 MG capsule  gabapentin (NEURONTIN) 100 MG capsule  glipiZIDE (GLUCOTROL) 10 MG tablet  Syringe/Needle, Disp, 18G X 1-1/2\" 3 ML MISC  testosterone cypionate (DEPOTESTOSTERONE) 200 MG/ML injection        ALLERGIES:  Allergies   Allergen Reactions    Bee Venom Anaphylaxis    Lorazepam        FAMILY HISTORY:  Family History   Problem Relation Age of Onset    Lupus Mother     Fibromyalgia Mother     Kidney Disease Mother     Clotting Disorder Mother     Depression Mother     Early Death Mother     Hypertension Father     Coronary Artery Disease Father     Diabetes Father     Arthritis Father     No Known Problems Sister     No Known Problems Brother     Diabetes Maternal Grandmother     Alcoholism Maternal Grandmother     Prostate Cancer Maternal Grandfather     Alcoholism Maternal Grandfather     Cancer Maternal Grandfather     Colon Cancer Paternal Grandmother     Arthritis Paternal Grandmother     Cancer Paternal Grandmother     Hypertension Paternal Grandmother     Substance Abuse Maternal Aunt        SOCIAL HISTORY:   Social History     Socioeconomic History    Marital status:      Spouse name: None    Number of children: None    Years of education: None    Highest education level: None   Tobacco Use    Smoking status: Former    Smokeless tobacco: Current     Types: Chew   Former smoker.  No current drugs, alcohol, or tobacco.    VITALS:  BP (!) 188/94   Pulse (!) 124   Temp 97.9  F (36.6  C) (Oral)   Resp 16   Ht 1.93 m (6' 4\")   Wt 128.4 kg (283 lb)   SpO2 97%   BMI 34.45 kg/m      PHYSICAL EXAM    Vital Signs:  BP (!) 188/94   Pulse " "(!) 124   Temp 97.9  F (36.6  C) (Oral)   Resp 16   Ht 1.93 m (6' 4\")   Wt 128.4 kg (283 lb)   SpO2 97%   BMI 34.45 kg/m    General:  On entering the room he is in no apparent distress.    Neck:  Neck supple with full range of motion and nontender.    Back:  Back and spine are nontender, except there is tenderness just left of the lumbosacral spine.  No costovertebral angle tenderness.    HEENT:  Oropharynx clear with moist mucous membranes.  HEENT unremarkable.    Pulmonary:  Chest clear to auscultation without rhonchi rales or wheezing.    Cardiovascular:  Cardiac regular rate and rhythm without murmurs rubs or gallops.    Abdomen:  Abdomen soft nontender.  There is no rebound or guarding.    Muskuloskeletal:  He moves all 4 without any difficulty and has normal neurovascular exams.  Extremities without clubbing, cyanosis, or edema.  Legs and calves are nontender.    Neuro:  He is alert and oriented ×3 and moves all extremities symmetrically.  Strength and sensation intact and normal in both legs.  Psych:  Normal affect.    Skin:  Unremarkable and warm and dry.       LAB:  All pertinent labs reviewed and interpreted.  Labs Ordered and Resulted from Time of ED Arrival to Time of ED Departure - No data to display    RADIOLOGY:  Reviewed all pertinent imaging. Please see official radiology report.  XR Sacrum and Coccyx 2 Views   Final Result   IMPRESSION: Normal joint spaces and alignment. No fracture.      CT Lumbar Spine w/o Contrast   Final Result   IMPRESSION:   1.  No fracture or posttraumatic subluxation.                    EKG:          PROCEDURES:         I, Marquita Narayanan, am serving as a scribe to document services personally performed by Dr. Herbert based on my observation and the provider's statements to me. I, Sekou Herbert MD attest that Marquita Narayanan is acting in a scribe capacity, has observed my performance of the services and has documented them in accordance with my direction.    Sekou Herbert, " M.D.  Emergency Medicine  [facility] EMERGENCY DEPARTMENT  [facility] [facility] [address]  [phone]  Dept: [phone]       Sekou Herbert MD  09/19/23 0121

## 2023-09-19 NOTE — ED TRIAGE NOTES
Pt reports 4 days ago he was outside with his dog when he tripped over the dog and landed on a trailer hitch with his tailbone. Pt reports pain has been getting worse. Denies hitting his head. Denies any other injuries.

## 2023-09-20 RX ORDER — GLIPIZIDE 10 MG/1
10 TABLET ORAL
Qty: 180 TABLET | Refills: 0 | Status: SHIPPED | OUTPATIENT
Start: 2023-09-20 | End: 2024-01-17

## 2023-09-21 ASSESSMENT — ENCOUNTER SYMPTOMS
MYALGIAS: 1
MUSCLE CRAMPS: 1
WEIGHT GAIN: 0
WEIGHT LOSS: 0
MEMORY LOSS: 0
SEIZURES: 0
PARALYSIS: 0
TINGLING: 1
NUMBNESS: 1
INCREASED ENERGY: 1
LOSS OF CONSCIOUSNESS: 0
POLYPHAGIA: 0
DYSURIA: 0
HEMATURIA: 0
DECREASED APPETITE: 0
WEAKNESS: 1
DIFFICULTY URINATING: 0
FLANK PAIN: 0
NECK PAIN: 1
ALTERED TEMPERATURE REGULATION: 0
FEVER: 0
STIFFNESS: 1
DIZZINESS: 1
JOINT SWELLING: 0
TREMORS: 1
HEADACHES: 1
DISTURBANCES IN COORDINATION: 1
SPEECH CHANGE: 0
FATIGUE: 1
POLYDIPSIA: 0
ARTHRALGIAS: 1
NIGHT SWEATS: 1
BACK PAIN: 1
CHILLS: 0
MUSCLE WEAKNESS: 1
HALLUCINATIONS: 0

## 2023-09-25 ENCOUNTER — HOSPITAL ENCOUNTER (EMERGENCY)
Facility: HOSPITAL | Age: 53
Discharge: HOME | End: 2023-09-25
Attending: EMERGENCY MEDICINE | Admitting: EMERGENCY MEDICINE
Payer: COMMERCIAL

## 2023-09-25 ENCOUNTER — OFFICE VISIT (OUTPATIENT)
Dept: PHYSICAL MEDICINE AND REHAB | Facility: CLINIC | Age: 53
End: 2023-09-25
Attending: SURGERY
Payer: COMMERCIAL

## 2023-09-25 ENCOUNTER — MYC MEDICAL ADVICE (OUTPATIENT)
Dept: FAMILY MEDICINE | Facility: CLINIC | Age: 53
End: 2023-09-25

## 2023-09-25 VITALS
DIASTOLIC BLOOD PRESSURE: 88 MMHG | RESPIRATION RATE: 20 BRPM | TEMPERATURE: 97.5 F | WEIGHT: 283 LBS | BODY MASS INDEX: 34.46 KG/M2 | SYSTOLIC BLOOD PRESSURE: 205 MMHG | OXYGEN SATURATION: 95 % | HEART RATE: 100 BPM | HEIGHT: 76 IN

## 2023-09-25 VITALS
SYSTOLIC BLOOD PRESSURE: 134 MMHG | HEART RATE: 95 BPM | DIASTOLIC BLOOD PRESSURE: 89 MMHG | BODY MASS INDEX: 34.46 KG/M2 | WEIGHT: 283 LBS | OXYGEN SATURATION: 96 % | HEIGHT: 76 IN

## 2023-09-25 DIAGNOSIS — M54.16 LEFT LUMBAR RADICULOPATHY: Primary | ICD-10-CM

## 2023-09-25 DIAGNOSIS — G89.29 ACUTE EXACERBATION OF CHRONIC LOW BACK PAIN: ICD-10-CM

## 2023-09-25 DIAGNOSIS — M54.9 BACK PAIN, UNSPECIFIED BACK LOCATION, UNSPECIFIED BACK PAIN LATERALITY, UNSPECIFIED CHRONICITY: ICD-10-CM

## 2023-09-25 DIAGNOSIS — M54.50 ACUTE EXACERBATION OF CHRONIC LOW BACK PAIN: ICD-10-CM

## 2023-09-25 PROCEDURE — 96372 THER/PROPH/DIAG INJ SC/IM: CPT | Performed by: EMERGENCY MEDICINE

## 2023-09-25 PROCEDURE — 250N000012 HC RX MED GY IP 250 OP 636 PS 637: Performed by: EMERGENCY MEDICINE

## 2023-09-25 PROCEDURE — 99204 OFFICE O/P NEW MOD 45 MIN: CPT | Performed by: NURSE PRACTITIONER

## 2023-09-25 PROCEDURE — 250N000011 HC RX IP 250 OP 636: Mod: JZ | Performed by: EMERGENCY MEDICINE

## 2023-09-25 PROCEDURE — 99284 EMERGENCY DEPT VISIT MOD MDM: CPT

## 2023-09-25 RX ORDER — PREDNISONE 20 MG/1
40 TABLET ORAL ONCE
Status: COMPLETED | OUTPATIENT
Start: 2023-09-25 | End: 2023-09-25

## 2023-09-25 RX ORDER — KETOROLAC TROMETHAMINE 30 MG/ML
30 INJECTION, SOLUTION INTRAMUSCULAR; INTRAVENOUS ONCE
Status: COMPLETED | OUTPATIENT
Start: 2023-09-25 | End: 2023-09-25

## 2023-09-25 RX ORDER — HYDROMORPHONE HYDROCHLORIDE 1 MG/ML
0.5 INJECTION, SOLUTION INTRAMUSCULAR; INTRAVENOUS; SUBCUTANEOUS ONCE
Status: COMPLETED | OUTPATIENT
Start: 2023-09-25 | End: 2023-09-25

## 2023-09-25 RX ORDER — METHYLPREDNISOLONE 4 MG
TABLET, DOSE PACK ORAL
Qty: 21 TABLET | Refills: 0 | Status: SHIPPED | OUTPATIENT
Start: 2023-09-25 | End: 2023-10-03

## 2023-09-25 RX ORDER — IBUPROFEN 400 MG/1
400 TABLET, FILM COATED ORAL EVERY 6 HOURS PRN
Qty: 20 TABLET | Refills: 0 | Status: SHIPPED | OUTPATIENT
Start: 2023-09-25 | End: 2023-10-13

## 2023-09-25 RX ADMIN — HYDROMORPHONE HYDROCHLORIDE 0.5 MG: 1 INJECTION, SOLUTION INTRAMUSCULAR; INTRAVENOUS; SUBCUTANEOUS at 03:52

## 2023-09-25 RX ADMIN — PREDNISONE 40 MG: 20 TABLET ORAL at 03:56

## 2023-09-25 RX ADMIN — KETOROLAC TROMETHAMINE 30 MG: 30 INJECTION, SOLUTION INTRAMUSCULAR; INTRAVENOUS at 03:54

## 2023-09-25 ASSESSMENT — ACTIVITIES OF DAILY LIVING (ADL)
ADLS_ACUITY_SCORE: 37
ADLS_ACUITY_SCORE: 33

## 2023-09-25 ASSESSMENT — PAIN SCALES - GENERAL: PAINLEVEL: EXTREME PAIN (8)

## 2023-09-25 NOTE — PATIENT INSTRUCTIONS
Imaging (Xray, CT, or MRI) has been ordered today.   Radiology will call you to schedule. Please call below if you do not hear from them in the next couple of days.     [facility] Radiology Scheduling:  Please call [phone] to schedule your image(s) (select option #1).    There are 3 different locations:    [facility]  [address] Imaging - [place]  [address] [facility], [address]   [place] [id number]    [place] [address]     -Start the medrol dose pack that was sent to your pharmacy  -Increase your gabapentin to 200mg three times per day   -OK to resume ibuprofen AFTER you complete your treatment with steroids.  -Continue tylenol    ~Please call our [facility] Nurse Navigation line [phone] with any questions or concerns about your treatment plan, if symptoms worsen and you would like to be seen urgently, or if you have any new or worsening numbness, weakness, or problems controlling bladder and bowel function.  ~You are also welcome to contact Bárbara Daniel via [employer], but please be aware that responses to [employer] message may take 2-3 days due to the high volume of patients seen in clinic.

## 2023-09-25 NOTE — LETTER
9/25/2023         RE: Morris Christopher  [address] [place] [address]        Dear Colleague,    Thank you for referring your patient, Morris Christopher, to the [facility]. Please see a copy of my visit note below.    ASSESSMENT: Morris Christopher is a 53 year old male who presents for consultation at the request of PCP No Ref-Primary, Physician, who presents today for new patient evaluation of:    -Left lumbar radiculopathy    Patient has sensory loss throughout the left lower leg and foot to light touch.  Left Achilles reflexes 0.  He is unable to walk due to the severity of his pain.  He would not be able to participate in physical therapy at this time.  We reviewed his lumbar MRI from April.  At the time he had a left-sided disc herniation at L4-5 causing compression of the left L5 nerve root.  His acute symptoms could be related to worsening of this disc herniation.  Given his sensory loss, intractable pain so far, recommended a stat lumbar MRI without contrast for further evaluation and treatment planning.  Recommended starting Medrol Dosepak and increasing his current gabapentin to 200 mg 3 times daily.  We will call him with the results of his imaging, he would wish to pursue an injection for pain control if imaging correlates with his symptoms.    If imaging is negative for left sided nerve impingement, given smaller left calf muscle, would likely refer patient to ortho to consider whether a ligament or tendon was potentially torn in the proximal leg          9/21/2023     9:51 AM   OSWESTRY DISABILITY INDEX   Count 10   Sum 33   Oswestry Score (%) 66 %            Diagnoses and all orders for this visit:  Back pain, unspecified back location, unspecified back pain laterality, unspecified chronicity  -     Spine  Referral      PLAN:  Reviewed spine anatomy and disease process. Discussed diagnosis and treatment options with the patient today. A shared  decision making model was used.  The patient's values and choices were respected. The following represents what was discussed and decided upon by the provider and the patient.      -DIAGNOSTIC TESTS:  Images were personally reviewed and interpreted and explained to patient today using a spine model.   -- Ordered lumbar MRI without contrast today    -PHYSICAL THERAPY:    -Gagan is obviously in too much pain to be participatory in physical therapy.  If pain control can be achieved, would recommend physical therapy.         -MEDICATIONS:    -He will start the Medrol Dosepak that was sent this morning by the emergency room provider  -Recommended increasing gabapentin from 200 mg in the morning, to 200 mg 3 times daily.  We can increase this from there if it feels like it is helpful and he does not experience significant side effects on this.  Discussed multiple medication options today with patient. Discussed risks, side effects, and proper use of medications. Patient verbalized understanding.    -INTERVENTIONS:    Patient would be a good candidate for injections if future imaging correlates with symptoms.  We will review his imaging and decide plan    -PATIENT EDUCATION:  Total time of 40 minutes, on the day of service, spent with the patient, reviewing the chart, placing orders, and documenting.   -Today we also discussed the pros and cons of the current treatment plan.    -FOLLOW-UP:   We will call with results of MRI    Advised patient to call the Spine Center if symptoms worsen, new numbness or weakness develops in the legs, or if they develop new or worsening problems controlling bladder or bowel function.   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    Gagan  endorses a history of chronic lower back pain.  Patient's acute symptoms started 6 days ago after a mechanical fall.  He tripped in his garage over his yellow bustamante, landing backwards onto his trailer hitch bulb.  His back pain and left leg pain started immediately.  It radiates from his left lower back into the back of his left thigh and calf stopping at his heel.  He initially tried ice, heat, and Tylenol without relief.  He was then seen in the emergency room on 9/18. He had a lumbar CT on 9/19 as well as sacrum and coccyx x-rays which were negative for fracture.  Robaxin, ibuprofen were recommended.  He has not experienced any improvement.  He tried to go to work, but was only able to work for 4 hours 1 day.  States he responded to a bed alarm, and tried to walk to the patient's room and fell due to pain.  He has been unable to sleep, walk, do any physical activity.  Therefore he returned to the emergency room this morning.  He states no imaging was done.  He was given a dose of steroids and IM Dilaudid, which did not help at all.  A Medrol Dosepak was sent to his pharmacy, and it was recommended that he take ibuprofen 600, and he was referred to the spine center.    Gagan is visibly uncomfortable due to pain today.  He explains his back and leg pain is still a 10 out of 10.  When seated, the pain is on the left side of the spine.  When he bears weight, he experiences shooting left leg pain into the back leg into the heel also the left groin. He has new constant numbness in his left leg as well. It is worse with weightbearing. The entire left leg is weak. He tries to take a step on it, and he stumbles. The past few days, he experiences excruciating pain when he pushes to go to the bathroom. He has not had any changes in bowel or bladder control or saddle anesthesia.    Medrol Dosepak was sent by the emergency room to his pharmacy earlier this morning.  He has not picked it up yet.  He is on chronic gabapentin  "200 mg in the morning only, and also on chronic Cymbalta 90 mg daily.    His last lumbar MRI was in April at which time he had a left-sided disc herniation at L4-5, causing near complete effacement of the left subarticular space and probable impingement on the descending left L5 nerve root  He had lumbar x-rays done in April as well.  Which were negative for instability.    He saw Dr. Berrios neurosurgeon in April 2023 and conservative treatment was recommended.  Gagan feels this new pain is significantly different from his back pain from before.    -Treatment to Date:     -Medications:  -Tylenol  -Ibuprofen  -Prednisone  -IM Dilaudid  -Robaxin  -IM Toradol  -Gabapentin  -Cymbalta    Current Outpatient Medications   Medication     alcohol swab prep pads     BD HYPODERMIC NEEDLE 25G X 1-1/2\" MISC     blood glucose (ACCU-CHEK GUIDE) test strip     blood glucose monitoring (FREESTYLE) lancets     DULoxetine (CYMBALTA) 30 MG capsule     DULoxetine (CYMBALTA) 60 MG capsule     gabapentin (NEURONTIN) 100 MG capsule     glipiZIDE (GLUCOTROL) 10 MG tablet     ibuprofen (ADVIL/MOTRIN) 400 MG tablet     methylPREDNISolone (MEDROL DOSEPAK) 4 MG tablet therapy pack     Syringe/Needle, Disp, 18G X 1-1/2\" 3 ML MISC     testosterone cypionate (DEPOTESTOSTERONE) 200 MG/ML injection     No current facility-administered medications for this visit.       Allergies   Allergen Reactions     Bee Venom Anaphylaxis     Lorazepam        Past Medical History:   Diagnosis Date     Arthritis 2004     Depression 2019     Diabetes mellitus (H) 2019     Prediabetes 11/2/2018        Patient Active Problem List   Diagnosis     Diabetes mellitus, type 2 (H)     Morbid obesity (H)     Hypogonadism male     Mild major depression (H)     Right inguinal hernia     Diabetic polyneuropathy associated with type 2 diabetes mellitus (H)     Bulging lumbar disc     Screen for colon cancer       Past Surgical History:   Procedure Laterality Date     ARTHROSCOPY " KNEE      bilateral      ARTHROSCOPY SHOULDER  2016     TONSILLECTOMY         Family History   Problem Relation Age of Onset     Lupus Mother      Fibromyalgia Mother      Kidney Disease Mother      Clotting Disorder Mother      Depression Mother      Early Death Mother      Hypertension Father      Coronary Artery Disease Father      Diabetes Father      Arthritis Father      No Known Problems Sister      No Known Problems Brother      Diabetes Maternal Grandmother      Alcoholism Maternal Grandmother      Prostate Cancer Maternal Grandfather      Alcoholism Maternal Grandfather      Cancer Maternal Grandfather      Colon Cancer Paternal Grandmother      Arthritis Paternal Grandmother      Cancer Paternal Grandmother      Hypertension Paternal Grandmother      Substance Abuse Maternal Aunt        Reviewed past medical, surgical, and family history with patient found on new patient intake packet located in EMR [employer] tab.     ROS: Per below. specifically negative for bowel/bladder dysfunction, balance changes, headache, dizziness, foot drop, fevers, chills, appetite changes, nausea/vomiting, unexplained weight loss. Otherwise 13 systems reviewed are negative. Please see the patient's intake questionnaire from today for details.    Answers submitted by the patient for this visit:  Symptoms you have experienced in the last 30 days (Submitted on 9/21/2023)  General Symptoms: Yes  Skin Symptoms: No  HENT Symptoms: No  EYE SYMPTOMS: No  HEART SYMPTOMS: No  LUNG SYMPTOMS: No  INTESTINAL SYMPTOMS: No  URINARY SYMPTOMS: Yes  REPRODUCTIVE SYMPTOMS: Yes  SKELETAL SYMPTOMS: Yes  BLOOD SYMPTOMS: No  NERVOUS SYSTEM SYMPTOMS: Yes  MENTAL HEALTH SYMPTOMS: No  Please answer the questions below to tell us what conditions you are experiencing: (Submitted on 9/21/2023)  Fever: No  Loss of appetite: No  Weight loss: No  Weight gain: No  Fatigue: Yes  Night sweats: Yes  Chills: No  Increased stress: Yes  Excessive hunger: No  Excessive  "thirst: No  Feeling hot or cold when others believe the temperature is normal: No  Loss of height: Yes  Post-operative complications: No  Surgical site pain: No  Hallucinations: No  Change in or Loss of Energy: Yes  Hyperactivity: No  Confusion: No  Please answer the questions below to tell us what condition you are experiencing: (Submitted on 9/21/2023)  Trouble holding urine or incontinence: Yes  Pain or burning: No  Trouble starting or stopping: No  Increased frequency of urination: Yes  Blood in urine: No  Decreased frequency of urination: No  Frequent nighttime urination: No  Flank pain: No  Difficulty emptying bladder: No  Please answer the questions below to tell us what condition you are experiencing: (Submitted on 9/21/2023)  Scrotal pain or swelling: No  Erectile dysfunction: Yes  Penile discharge: No  Genital ulcers: No  Reduced libido: Yes  Please answer the questions below to tell us what condition you are experiencing: (Submitted on 9/21/2023)  Back pain: Yes  Muscle aches: Yes  Neck pain: Yes  Swollen joints: No  Joint pain: Yes  Bone pain: Yes  Muscle cramps: Yes  Muscle weakness: Yes  Joint stiffness: Yes  Bone fracture: No  Please answer the questions below to tell us what condition you are experiencing: (Submitted on 9/21/2023)  Trouble with coordination: Yes  Dizziness or trouble with balance: Yes  Fainting or black-out spells: No  Memory loss: No  Headache: Yes  Seizures: No  Speech problems: No  Tingling: Yes  Tremor: Yes  Weakness: Yes  Difficulty walking: Yes  Paralysis: No  Numbness: Yes      OBJECTIVE:  /89   Pulse 95   Ht 6' 4\" (1.93 m)   Wt 283 lb (128.4 kg)   SpO2 96%   BMI 34.45 kg/m      PHYSICAL EXAMINATION:    --CONSTITUTIONAL:  Vital signs as above.  Appears quite uncomfortable due to pain.  Shifting frequently  --PSYCHIATRIC:  Appropriate mood and affect. The patient is awake, alert, oriented to person, place, time and answering questions appropriately with clear speech. "    --SKIN:  Skin over the face, bilateral lower extremities, and posterior torso is clean, dry, intact without rashes.    --RESPIRATORY: Normal rhythm and effort. No abnormal accessory muscle breathing patterns noted.   --ABDOMINAL:  Non-distended.    --GROSS MOTOR: Gait is quite limited due to pain. arises with significant pain from a seated position. Limps with difficulty to table. did not attempt toe or heel walk     --LOWER EXTREMITY MOTOR TESTING:  Hip flexion: right 5/5, left 4+/5  Quads: right 5/5, left 5/5  Hamstrings: right 5/5, left 5/5  Dorsiflexion: right 5/5, left 5/5  Plantar flexion: right 5/5, left 5-/5    Great toe MTP extension/EHL: right 5/5, left 5/5    --NEUROLOGICAL:  2/4 patellar reflexes, 0+ left achilles, 1+ right achilles.     Sensation to light touch is decreased throughout the left lower leg, all planes and dorsal and plantar aspects of left foot. Sensation is otherwise intact throughout right lower extremity and miguel proximal lower extremities.     Babinski is negative. No clonus.    --MUSCULOSKELETAL: Lumbar spine inspection reveals no evidence of deformity. Range of motion is significantly limited in all planes, including lumbar flexion, extension, lateral rotation due to pain. Tenderness to palpation of L4-5 area. No paraspinal musculature tenderness.     Straight leg raising is positive on left.    --SACROILIAC JOINT:  One finger point test was negative. positive SI joint tenderness to palpation bilaterally. Would not be able to test SI maneuvers due to severity of patient's pain     --VASCULAR:  Bilateral lower extremities are warm without any discoloration.  There is no pitting edema of the bilateral lower extremities. If anything, patient's calf muscle of left lower leg appears smaller,  less proud than the right.    RESULTS:   Prior medical records from [facility]where were reviewed today.    Imaging: Spine imaging was personally reviewed and interpreted  today. The images were shown to the patient and the findings were explained using a spine model.      XR Sacrum and Coccyx 2 Views    Result Date: 9/19/2023  EXAM: XR SACRUM AND COCCYX 2 VIEWS LOCATION: [facility] DATE: 9/19/2023 INDICATION: Pain after fall. COMPARISON: None.     IMPRESSION: Normal joint spaces and alignment. No fracture.        CT Lumbar Spine w/o Contrast    Result Date: 9/19/2023  EXAM: CT LUMBAR SPINE W/O CONTRAST LOCATION: [facility] DATE: 9/19/2023 INDICATION: Back pain. Back injury. COMPARISON: None. TECHNIQUE: Routine CT Lumbar Spine without IV contrast. Multiplanar reformats. Dose reduction techniques were used. FINDINGS: VERTEBRA: Normal vertebral body heights and alignment. No fracture or posttraumatic subluxation. CANAL/FORAMINA: No canal or neural foraminal stenosis. PARASPINAL: No extraspinal abnormality.     IMPRESSION: 1.  No fracture or posttraumatic subluxation.       EXAM: MR LUMBAR SPINE W/O and W CONTRAST  LOCATION: [facility]  DATE/TIME: 4/21/2023 10:18 PM CDT     INDICATION: Low back pain with radiculopathy  COMPARISON: Lumbar spine CT dated 04/09/2023.  CONTRAST: Gadavist 13 mL  TECHNIQUE: Routine Lumbar Spine MRI without and with IV contrast.     FINDINGS:   The spine is imaged from T12-S3. Transitional lumbosacral anatomy with a sacralized L5 vertebral body and rudimentary ribs arising from the T12 vertebral body. Straightening of the usual spinal curvature without significant spondylolisthesis. No evidence   of acute fracture. No suspicious marrow signal alteration. The conus medullaris terminates at T12-L1 interspace. The visualized spinal cord is unremarkable. No pathologic enhancement. The paravertebral structures are unremarkable. The visualized bony   pelvis is unremarkable.     T12-L1: Mild disc height loss and mild facet arthropathy without significant spinal canal stenosis or neural  foraminal narrowing.     L1-L2: Normal disc height and mild facet arthropathy without significant spinal canal stenosis or neural foraminal narrowing.     L2-L3: Mild disc height loss with symmetric disc bulge and mild to moderate facet arthropathy results in mild to moderate bilateral neural foraminal narrowing without significant spinal canal stenosis.     L3-L4: Mild to moderate disc height loss with symmetric disc bulge and shallow central disc protrusion in combination with mild to moderate facet arthropathy results in mild spinal canal stenosis with mild to moderate bilateral neural foraminal   narrowing.     L4-L5: Moderate disc height loss with left asymmetric disc bulge in combination with moderate facet arthropathy results in near complete effacement of the left subarticular space with probable impingement on the descending left L5 nerve root and moderate   bilateral neural foraminal narrowing.     L5-S1: A sacralized L5 vertebral body, mild to moderate disc height loss, and mild to moderate facet arthropathy without significant spinal canal stenosis or neural foraminal narrowing.                                                                      IMPRESSION:     1.  Multilevel degenerative change of the lumbar spine as described, most prominent at L4-L5 where there is near complete effacement of the left subarticular space and probable impingement on the descending left L5 nerve root as well as moderate   bilateral neural foraminal narrowing      Narrative & Impression   EXAM: CT LUMBAR SPINE W/O CONTRAST  LOCATION: [facility]  DATE: 9/19/2023     INDICATION: Back pain. Back injury.  COMPARISON: None.  TECHNIQUE: Routine CT Lumbar Spine without IV contrast. Multiplanar reformats. Dose reduction techniques were used.      FINDINGS:  VERTEBRA: Normal vertebral body heights and alignment. No fracture or posttraumatic subluxation.      CANAL/FORAMINA: No canal or neural foraminal  stenosis.     PARASPINAL: No extraspinal abnormality.                                                                      IMPRESSION:  1.  No fracture or posttraumatic subluxation.          EXAM: XR LUMBAR SPINE W BENDING COMP G/E 6 VIEWS  LOCATION: [facility]  DATE/TIME: 4/21/2023 9:10 PM CDT     INDICATION: Back pain  COMPARISON: CT lumbar spine dated 04/09/2023.                                                                      IMPRESSION: Transitional lumbosacral anatomy with a sacralized L5 vertebral body and rudimentary ribs arising from the T12 vertebral body. Straightening of usual lumbar lordosis without significant spinal canal stenosis. Spinal alignment is maintained   with flexion/extension No acute fracture. Scattered multilevel degenerative change including mild to moderate disc height loss at L4-L5 and moderate disc height loss at L5-S1. Pelvic phleboliths.      Bárbara YANGP-C  [facility]  O. [phone]             Again, thank you for allowing me to participate in the care of your patient.        Sincerely,        JL [place] CNP

## 2023-09-25 NOTE — PROGRESS NOTES
ASSESSMENT: Morris Christopher is a 53 year old male who presents for consultation at the request of PCP No Ref-Primary, Physician, who presents today for new patient evaluation of:    -Left lumbar radiculopathy    Patient has sensory loss throughout the left lower leg and foot to light touch.  Left Achilles reflexes 0.  He is unable to walk due to the severity of his pain.  He would not be able to participate in physical therapy at this time.  We reviewed his lumbar MRI from April.  At the time he had a left-sided disc herniation at L4-5 causing compression of the left L5 nerve root.  His acute symptoms could be related to worsening of this disc herniation.  Given his sensory loss, intractable pain so far, recommended a stat lumbar MRI without contrast for further evaluation and treatment planning.  Recommended starting Medrol Dosepak and increasing his current gabapentin to 200 mg 3 times daily.  We will call him with the results of his imaging, he would wish to pursue an injection for pain control if imaging correlates with his symptoms.    If imaging is negative for left sided nerve impingement, given smaller left calf muscle, would likely refer patient to ortho to consider whether a ligament or tendon was potentially torn in the proximal leg          9/21/2023     9:51 AM   OSWESTRY DISABILITY INDEX   Count 10   Sum 33   Oswestry Score (%) 66 %            Diagnoses and all orders for this visit:  Back pain, unspecified back location, unspecified back pain laterality, unspecified chronicity  -     [facility] Referral      PLAN:  Reviewed spine anatomy and disease process. Discussed diagnosis and treatment options with the patient today. A shared decision making model was used.  The patient's values and choices were respected. The following represents what was discussed and decided upon by the provider and the patient.      -DIAGNOSTIC TESTS:  Images were personally reviewed and interpreted and explained to  patient today using a spine model.   -- Ordered lumbar MRI without contrast today    -PHYSICAL THERAPY:    -Gagan is obviously in too much pain to be participatory in physical therapy.  If pain control can be achieved, would recommend physical therapy.         -MEDICATIONS:    -He will start the Medrol Dosepak that was sent this morning by the emergency room provider  -Recommended increasing gabapentin from 200 mg in the morning, to 200 mg 3 times daily.  We can increase this from there if it feels like it is helpful and he does not experience significant side effects on this.  Discussed multiple medication options today with patient. Discussed risks, side effects, and proper use of medications. Patient verbalized understanding.    -INTERVENTIONS:    Patient would be a good candidate for injections if future imaging correlates with symptoms.  We will review his imaging and decide plan    -PATIENT EDUCATION:  Total time of 40 minutes, on the day of service, spent with the patient, reviewing the chart, placing orders, and documenting.   -Today we also discussed the pros and cons of the current treatment plan.    -FOLLOW-UP:   We will call with results of MRI    Advised patient to call the [facility] if symptoms worsen, new numbness or weakness develops in the legs, or if they develop new or worsening problems controlling bladder or bowel function.   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    Gagan endorses a history of chronic lower back pain.  Patient's acute symptoms started 6 days ago after a mechanical fall.  He tripped in his garage over his yellow bustamante, landing backwards onto his trailer [employer] bulb.  His back pain and left leg pain started immediately.   It radiates from his left lower back into the back of his left thigh and calf stopping at his heel.  He initially tried ice, heat, and Tylenol without relief.  He was then seen in the emergency room on 9/18. He had a lumbar CT on 9/19 as well as sacrum and coccyx x-rays which were negative for fracture.  Robaxin, ibuprofen were recommended.  He has not experienced any improvement.  He tried to go to work, but was only able to work for 4 hours 1 day.  States he responded to a bed alarm, and tried to walk to the patient's room and fell due to pain.  He has been unable to sleep, walk, do any physical activity.  Therefore he returned to the emergency room this morning.  He states no imaging was done.  He was given a dose of steroids and IM Dilaudid, which did not help at all.  A Medrol Dosepak was sent to his pharmacy, and it was recommended that he take ibuprofen 600, and he was referred to the spine center.    Gagan is visibly uncomfortable due to pain today.  He explains his back and leg pain is still a 10 out of 10.  When seated, the pain is on the left side of the spine.  When he bears weight, he experiences shooting left leg pain into the back leg into the heel also the left groin. He has new constant numbness in his left leg as well. It is worse with weightbearing. The entire left leg is weak. He tries to take a step on it, and he stumbles. The past few days, he experiences excruciating pain when he pushes to go to the bathroom. He has not had any changes in bowel or bladder control or saddle anesthesia.    Medrol Dosepak was sent by the emergency room to his pharmacy earlier this morning.  He has not picked it up yet.  He is on chronic gabapentin 200 mg in the morning only, and also on chronic Cymbalta 90 mg daily.    His last lumbar MRI was in April at which time he had a left-sided disc herniation at L4-5, causing near complete effacement of the left subarticular space and probable impingement on the descending  "left L5 nerve root  He had lumbar x-rays done in April as well.  Which were negative for instability.    He saw Dr. Berrios neurosurgeon in April 2023 and conservative treatment was recommended.  Gagan feels this new pain is significantly different from his back pain from before.    -Treatment to Date:     -Medications:  -Tylenol  -Ibuprofen  -Prednisone  -IM Dilaudid  -Robaxin  -IM Toradol  -Gabapentin  -Cymbalta    Current Outpatient Medications   Medication    alcohol swab prep pads    BD HYPODERMIC NEEDLE 25G X 1-1/2\" MISC    blood glucose (ACCU-CHEK GUIDE) test strip    blood glucose monitoring (FREESTYLE) lancets    DULoxetine (CYMBALTA) 30 MG capsule    DULoxetine (CYMBALTA) 60 MG capsule    gabapentin (NEURONTIN) 100 MG capsule    glipiZIDE (GLUCOTROL) 10 MG tablet    ibuprofen (ADVIL/MOTRIN) 400 MG tablet    methylPREDNISolone (MEDROL DOSEPAK) 4 MG tablet therapy pack    Syringe/Needle, Disp, 18G X 1-1/2\" 3 ML MISC    testosterone cypionate (DEPOTESTOSTERONE) 200 MG/ML injection     No current facility-administered medications for this visit.       Allergies   Allergen Reactions    Bee Venom Anaphylaxis    Lorazepam        Past Medical History:   Diagnosis Date    Arthritis 2004    Depression 2019    Diabetes mellitus (H) 2019    Prediabetes 11/2/2018        Patient Active Problem List   Diagnosis    Diabetes mellitus, type 2 (H)    Morbid obesity (H)    Hypogonadism male    Mild major depression (H)    Right inguinal hernia    Diabetic polyneuropathy associated with type 2 diabetes mellitus (H)    Bulging lumbar disc    Screen for colon cancer       Past Surgical History:   Procedure Laterality Date    ARTHROSCOPY KNEE      bilateral     ARTHROSCOPY SHOULDER  2016    TONSILLECTOMY         Family History   Problem Relation Age of Onset    Lupus Mother     Fibromyalgia Mother     Kidney Disease Mother     Clotting Disorder Mother     Depression Mother     Early Death Mother     Hypertension Father     Coronary " Artery Disease Father     Diabetes Father     Arthritis Father     No Known Problems Sister     No Known Problems Brother     Diabetes Maternal Grandmother     Alcoholism Maternal Grandmother     Prostate Cancer Maternal Grandfather     Alcoholism Maternal Grandfather     Cancer Maternal Grandfather     Colon Cancer Paternal Grandmother     Arthritis Paternal Grandmother     Cancer Paternal Grandmother     Hypertension Paternal Grandmother     Substance Abuse Maternal Aunt        Reviewed past medical, surgical, and family history with patient found on new patient intake packet located in EMR [employer] tab.     ROS: Per below. specifically negative for bowel/bladder dysfunction, balance changes, headache, dizziness, foot drop, fevers, chills, appetite changes, nausea/vomiting, unexplained weight loss. Otherwise 13 systems reviewed are negative. Please see the patient's intake questionnaire from today for details.    Answers submitted by the patient for this visit:  Symptoms you have experienced in the last 30 days (Submitted on 9/21/2023)  General Symptoms: Yes  Skin Symptoms: No  HENT Symptoms: No  EYE SYMPTOMS: No  HEART SYMPTOMS: No  LUNG SYMPTOMS: No  INTESTINAL SYMPTOMS: No  URINARY SYMPTOMS: Yes  REPRODUCTIVE SYMPTOMS: Yes  SKELETAL SYMPTOMS: Yes  BLOOD SYMPTOMS: No  NERVOUS SYSTEM SYMPTOMS: Yes  MENTAL HEALTH SYMPTOMS: No  Please answer the questions below to tell us what conditions you are experiencing: (Submitted on 9/21/2023)  Fever: No  Loss of appetite: No  Weight loss: No  Weight gain: No  Fatigue: Yes  Night sweats: Yes  Chills: No  Increased stress: Yes  Excessive hunger: No  Excessive thirst: No  Feeling hot or cold when others believe the temperature is normal: No  Loss of height: Yes  Post-operative complications: No  Surgical site pain: No  Hallucinations: No  Change in or Loss of Energy: Yes  Hyperactivity: No  Confusion: No  Please answer the questions below to tell us what condition you are  "experiencing: (Submitted on 9/21/2023)  Trouble holding urine or incontinence: Yes  Pain or burning: No  Trouble starting or stopping: No  Increased frequency of urination: Yes  Blood in urine: No  Decreased frequency of urination: No  Frequent nighttime urination: No  Flank pain: No  Difficulty emptying bladder: No  Please answer the questions below to tell us what condition you are experiencing: (Submitted on 9/21/2023)  Scrotal pain or swelling: No  Erectile dysfunction: Yes  Penile discharge: No  Genital ulcers: No  Reduced libido: Yes  Please answer the questions below to tell us what condition you are experiencing: (Submitted on 9/21/2023)  Back pain: Yes  Muscle aches: Yes  Neck pain: Yes  Swollen joints: No  Joint pain: Yes  Bone pain: Yes  Muscle cramps: Yes  Muscle weakness: Yes  Joint stiffness: Yes  Bone fracture: No  Please answer the questions below to tell us what condition you are experiencing: (Submitted on 9/21/2023)  Trouble with coordination: Yes  Dizziness or trouble with balance: Yes  Fainting or black-out spells: No  Memory loss: No  Headache: Yes  Seizures: No  Speech problems: No  Tingling: Yes  Tremor: Yes  Weakness: Yes  Difficulty walking: Yes  Paralysis: No  Numbness: Yes      OBJECTIVE:  /89   Pulse 95   Ht 6' 4\" (1.93 m)   Wt 283 lb (128.4 kg)   SpO2 96%   BMI 34.45 kg/m      PHYSICAL EXAMINATION:    --CONSTITUTIONAL:  Vital signs as above.  Appears quite uncomfortable due to pain.  Shifting frequently  --PSYCHIATRIC:  Appropriate mood and affect. The patient is awake, alert, oriented to person, place, time and answering questions appropriately with clear speech.    --SKIN:  Skin over the face, bilateral lower extremities, and posterior torso is clean, dry, intact without rashes.    --RESPIRATORY: Normal rhythm and effort. No abnormal accessory muscle breathing patterns noted.   --ABDOMINAL:  Non-distended.    --GROSS MOTOR: Gait is quite limited due to pain. arises with " significant pain from a seated position. Limps with difficulty to table. did not attempt toe or heel walk     --LOWER EXTREMITY MOTOR TESTING:  Hip flexion: right 5/5, left 4+/5  Quads: right 5/5, left 5/5  Hamstrings: right 5/5, left 5/5  Dorsiflexion: right 5/5, left 5/5  Plantar flexion: right 5/5, left 5-/5    Great toe MTP extension/EHL: right 5/5, left 5/5    --NEUROLOGICAL:  2/4 patellar reflexes, 0+ left achilles, 1+ right achilles.     Sensation to light touch is decreased throughout the left lower leg, all planes and dorsal and plantar aspects of left foot. Sensation is otherwise intact throughout right lower extremity and miguel proximal lower extremities.     Babinski is negative. No clonus.    --MUSCULOSKELETAL: Lumbar spine inspection reveals no evidence of deformity. Range of motion is significantly limited in all planes, including lumbar flexion, extension, lateral rotation due to pain. Tenderness to palpation of L4-5 area. No paraspinal musculature tenderness.     Straight leg raising is positive on left.    --SACROILIAC JOINT:  One finger point test was negative. positive SI joint tenderness to palpation bilaterally. Would not be able to test SI maneuvers due to severity of patient's pain     --VASCULAR:  Bilateral lower extremities are warm without any discoloration.  There is no pitting edema of the bilateral lower extremities. If anything, patient's calf muscle of left lower leg appears smaller,  less proud than the right.    RESULTS:   Prior medical records from [facility] were reviewed today.    Imaging: Spine imaging was personally reviewed and interpreted today. The images were shown to the patient and the findings were explained using a spine model.      XR Sacrum and Coccyx 2 Views    Result Date: 9/19/2023  EXAM: XR SACRUM AND COCCYX 2 VIEWS LOCATION: [facility] DATE: 9/19/2023 INDICATION: Pain after fall. COMPARISON: None.     IMPRESSION:  Normal joint spaces and alignment. No fracture.        CT Lumbar Spine w/o Contrast    Result Date: 9/19/2023  EXAM: CT LUMBAR SPINE W/O CONTRAST LOCATION: [facility] DATE: 9/19/2023 INDICATION: Back pain. Back injury. COMPARISON: None. TECHNIQUE: Routine CT Lumbar Spine without IV contrast. Multiplanar reformats. Dose reduction techniques were used. FINDINGS: VERTEBRA: Normal vertebral body heights and alignment. No fracture or posttraumatic subluxation. CANAL/FORAMINA: No canal or neural foraminal stenosis. PARASPINAL: No extraspinal abnormality.     IMPRESSION: 1.  No fracture or posttraumatic subluxation.       EXAM: MR LUMBAR SPINE W/O and W CONTRAST  LOCATION: [facility]  DATE/TIME: 4/21/2023 10:18 PM CDT     INDICATION: Low back pain with radiculopathy  COMPARISON: Lumbar spine CT dated 04/09/2023.  CONTRAST: Gadavist 13 mL  TECHNIQUE: Routine Lumbar Spine MRI without and with IV contrast.     FINDINGS:   The spine is imaged from T12-S3. Transitional lumbosacral anatomy with a sacralized L5 vertebral body and rudimentary ribs arising from the T12 vertebral body. Straightening of the usual spinal curvature without significant spondylolisthesis. No evidence   of acute fracture. No suspicious marrow signal alteration. The conus medullaris terminates at T12-L1 interspace. The visualized spinal cord is unremarkable. No pathologic enhancement. The paravertebral structures are unremarkable. The visualized bony   pelvis is unremarkable.     T12-L1: Mild disc height loss and mild facet arthropathy without significant spinal canal stenosis or neural foraminal narrowing.     L1-L2: Normal disc height and mild facet arthropathy without significant spinal canal stenosis or neural foraminal narrowing.     L2-L3: Mild disc height loss with symmetric disc bulge and mild to moderate facet arthropathy results in mild to moderate bilateral neural foraminal narrowing without  significant spinal canal stenosis.     L3-L4: Mild to moderate disc height loss with symmetric disc bulge and shallow central disc protrusion in combination with mild to moderate facet arthropathy results in mild spinal canal stenosis with mild to moderate bilateral neural foraminal   narrowing.     L4-L5: Moderate disc height loss with left asymmetric disc bulge in combination with moderate facet arthropathy results in near complete effacement of the left subarticular space with probable impingement on the descending left L5 nerve root and moderate   bilateral neural foraminal narrowing.     L5-S1: A sacralized L5 vertebral body, mild to moderate disc height loss, and mild to moderate facet arthropathy without significant spinal canal stenosis or neural foraminal narrowing.                                                                      IMPRESSION:     1.  Multilevel degenerative change of the lumbar spine as described, most prominent at L4-L5 where there is near complete effacement of the left subarticular space and probable impingement on the descending left L5 nerve root as well as moderate   bilateral neural foraminal narrowing      Narrative & Impression   EXAM: CT LUMBAR SPINE W/O CONTRAST  LOCATION: [facility]  DATE: 9/19/2023     INDICATION: Back pain. Back injury.  COMPARISON: None.  TECHNIQUE: Routine CT Lumbar Spine without IV contrast. Multiplanar reformats. Dose reduction techniques were used.      FINDINGS:  VERTEBRA: Normal vertebral body heights and alignment. No fracture or posttraumatic subluxation.      CANAL/FORAMINA: No canal or neural foraminal stenosis.     PARASPINAL: No extraspinal abnormality.                                                                      IMPRESSION:  1.  No fracture or posttraumatic subluxation.          EXAM: XR LUMBAR SPINE W BENDING COMP G/E 6 VIEWS  LOCATION: [facility]  DATE/TIME: 4/21/2023 9:10 PM CDT      INDICATION: Back pain  COMPARISON: CT lumbar spine dated 04/09/2023.                                                                      IMPRESSION: Transitional lumbosacral anatomy with a sacralized L5 vertebral body and rudimentary ribs arising from the T12 vertebral body. Straightening of usual lumbar lordosis without significant spinal canal stenosis. Spinal alignment is maintained   with flexion/extension No acute fracture. Scattered multilevel degenerative change including mild to moderate disc height loss at L4-L5 and moderate disc height loss at L5-S1. Pelvic phleboliths.      Bárbara SHIPLEY-C  [facility]  O. [phone]

## 2023-09-25 NOTE — ED TRIAGE NOTES
Left lower back pain, sciatica flair up for about a week. Hx of back problems and some fusion l5S1, and bulged disks L2-L4. He states it hurts so bad it's hard to breath. Pain a 10. He states pain got worse yesterday walking to [employer]. He tried Tylenol and Ibuprofen without relief. He is hypertensive. Shooting pain down left leg new last 3 days.   Triage Assessment       Row Name 09/25/23 0249       Triage Assessment (Adult)    Airway WDL WDL       Respiratory WDL    Respiratory WDL WDL       Skin Circulation/Temperature WDL    Skin Circulation/Temperature WDL WDL       Cardiac WDL    Cardiac WDL WDL       Peripheral/Neurovascular WDL    Peripheral Neurovascular WDL WDL       Cognitive/Neuro/Behavioral WDL    Cognitive/Neuro/Behavioral WDL X

## 2023-09-25 NOTE — ED NOTES
When pt stood up, pain was severe, is better after getting up but appears to still be very painful.  Pt has spine center appt. At 0900.

## 2023-09-25 NOTE — ED PROVIDER NOTES
EMERGENCY DEPARTMENT ENCOUNTER      NAME: Morris Christopher  AGE: 53 year old male  YOB: 1970  MRN: [id number]  EVALUATION DATE & TIME: No admission date for patient encounter.    PCP: Blanca Silver    ED PROVIDER: Columba Martinez M.D.      Chief Complaint   Patient presents with    Back Pain         FINAL IMPRESSION:  1. Acute exacerbation of chronic low back pain          ED COURSE & MEDICAL DECISION MAKING:    ED Course as of 09/25/23 0450   Mon Sep 25, 2023   0316 Patient with chronic low back pain without recent f/u with back care specialists and with known herniated lumbar disc, mechanical fall 6 days ago with CT c-spine negative for acute pathology and with pain primarily to left paraspinal region without fever, focal midline pain, or new neuro anomalies, able to transfer but limited by pain, on gabapentin and with no saddle anesthesia and great toes intact, bilateral patellar reflexes intact, ameanble to NSAID with last dose 8h ago with ketorolac improving back pain historically, CT lumbar spine without acute traumatic pathology identified 6 days ago, and begun on medrol dose pack. He notes IM dilaudid has helped him in the past, given single IM dose in ED and patient does have apointment this morning in a few hours at the spine care center for his back pain, aemanble to treatment here in the ED for pain improvement in interval between now and when he can follow with back specialist in the AM. Patient discharged after being provided with extensive anticipatory guidance and given return precautions, importance of PMD follow-up emphasized.        Pertinent Labs & Imaging studies reviewed. (See chart for details)    3:04 AM I met with the patient to gather history and perform my exam. Plan for discharge discussed.     Medical Decision Making    History:  Supplemental history from: Documented in chart, if applicable and Family Member/Significant Other  External Record(s) reviewed: Documented in  chart, if applicable.    Work Up:  Chart documentation includes differential considered and any EKGs or imaging independently interpreted by provider, where specified.  In additional to work up documented, I considered the following work up: Documented in chart, if applicable.    External consultation:  Discussion of management with another provider: Documented in chart, if applicable    Complicating factors:  Care impacted by chronic illness: Chronic Pain, Diabetes, Hypertension, and Other: morbid obesity  Care affected by social determinants of health: N/A    Disposition considerations: Discharge. I prescribed additional prescription strength medication(s) as charted. See documentation for any additional details.        At the conclusion of the encounter I discussed the results of all of the tests and the disposition. The questions were answered. The patient or family acknowledged understanding and was agreeable with the care plan.     MEDICATIONS GIVEN IN THE EMERGENCY:  Medications   ketorolac (TORADOL) injection 30 mg (30 mg Intramuscular $Given 9/25/23 0354)   HYDROmorphone (PF) (DILAUDID) injection 0.5 mg (0.5 mg Intramuscular $Given 9/25/23 0352)   predniSONE (DELTASONE) tablet 40 mg (40 mg Oral $Given 9/25/23 0356)       NEW PRESCRIPTIONS STARTED AT TODAY'S ER VISIT  New Prescriptions    IBUPROFEN (ADVIL/MOTRIN) 400 MG TABLET    Take 1 tablet (400 mg) by mouth every 6 hours as needed for moderate pain    METHYLPREDNISOLONE (MEDROL DOSEPAK) 4 MG TABLET THERAPY PACK    Follow Package Directions          =================================================================    HPI      Morris Christopher is a 53 year old male with PMHx of HTN, morbid obesity, DM type II with neuropathy, chronic low back pain with known bulging lumbar disc who presents to the ED today via private vehicle with back pain    Per chart review: The patient was seen in this ED on 9/19/23 for a fall. The patient had a low back contusion,  "but was neurovascularly intact. CT lumbar spine was performed and was negative for acute pathology. Patient treated with Toradol.      The patient reports his left-sided low back pain has been getting \"worse and worse all week\" and tonight, the patient \"couldn't even move\" due to the pain. The patient notes it even hurts to have a bowel movement and the pain is \"shooting down\" his left leg. The patient reports he can't put weight on his left leg due to the pain. The patient notes some foot numbness, but notes a history of diabetic neuropathy. The patient has been taking tylenol, ibuprofen, gabapentin, and robaxin without relief. The patient denies bowel or bladder incontinence, fever, or any other symptoms or complaints.     The patient notes he has an appointment with the spine clinic today.     REVIEW OF SYSTEMS   All other systems reviewed and are negative except as noted above in HPI.    PAST MEDICAL HISTORY:  Past Medical History:   Diagnosis Date    Arthritis 2004    Depression 2019    Diabetes mellitus (H) 2019    Prediabetes 11/2/2018       PAST SURGICAL HISTORY:  Past Surgical History:   Procedure Laterality Date    ARTHROSCOPY KNEE      bilateral     ARTHROSCOPY SHOULDER  2016    TONSILLECTOMY         CURRENT MEDICATIONS:    ibuprofen (ADVIL/MOTRIN) 400 MG tablet  methylPREDNISolone (MEDROL DOSEPAK) 4 MG tablet therapy pack  alcohol swab prep pads  BD HYPODERMIC NEEDLE 25G X 1-1/2\" MISC  blood glucose (ACCU-CHEK GUIDE) test strip  blood glucose monitoring (FREESTYLE) lancets  DULoxetine (CYMBALTA) 30 MG capsule  DULoxetine (CYMBALTA) 60 MG capsule  gabapentin (NEURONTIN) 100 MG capsule  glipiZIDE (GLUCOTROL) 10 MG tablet  Syringe/Needle, Disp, 18G X 1-1/2\" 3 ML MISC  testosterone cypionate (DEPOTESTOSTERONE) 200 MG/ML injection        ALLERGIES:  Allergies   Allergen Reactions    Bee Venom Anaphylaxis    Lorazepam        FAMILY HISTORY:  Family History   Problem Relation Age of Onset    Lupus Mother     " "Fibromyalgia Mother     Kidney Disease Mother     Clotting Disorder Mother     Depression Mother     Early Death Mother     Hypertension Father     Coronary Artery Disease Father     Diabetes Father     Arthritis Father     No Known Problems Sister     No Known Problems Brother     Diabetes Maternal Grandmother     Alcoholism Maternal Grandmother     Prostate Cancer Maternal Grandfather     Alcoholism Maternal Grandfather     Cancer Maternal Grandfather     Colon Cancer Paternal Grandmother     Arthritis Paternal Grandmother     Cancer Paternal Grandmother     Hypertension Paternal Grandmother     Substance Abuse Maternal Aunt        SOCIAL HISTORY:   Social History     Socioeconomic History    Marital status:    Tobacco Use    Smoking status: Former    Smokeless tobacco: Current     Types: Chew       VITALS:  Patient Vitals for the past 24 hrs:   BP Temp Temp src Pulse Resp SpO2 Height Weight   09/25/23 0248 (!) 205/88 97.5  F (36.4  C) Temporal 100 20 95 % 1.93 m (6' 4\") 128.4 kg (283 lb)       PHYSICAL EXAM    GENERAL: Awake, alert. Uncomfortable appearing   HEENT: Normocephalic, atraumatic.  Pupils equal, round and reactive.  Conjunctiva normal.  EOMI.  NECK: No stridor or apparent deformity.  PULMONARY: Symmetrical breath sounds without distress.  Lungs clear to auscultation bilaterally without wheezes, rhonchi or rales.  CARDIO: Regular rate and rhythm.  No significant murmur, rub or gallop.  Radial pulses strong and symmetrical.  ABDOMINAL: Abdomen soft, non-distended and non-tender to palpation.  No CVAT, no palpable hepatosplenomegaly.  EXTREMITIES: No lower extremity swelling or edema. Bilateral pedal pulses 2+ and bounding  BACK: midline nontender, left perispinal musculature tenderness, left SI joint tenderness   NEURO: Alert and oriented to person, place and time.  Cranial nerves grossly intact.  No focal motor deficit. No saddle anesthesia, no focal anaesthetic spots in lower extremities. " Bilateral lower extremity reflexes brisk 2+   PSYCH: Normal mood and affect  SKIN: No rashes      I, Liana Gutierrez, am serving as a scribe to document services personally performed by Dr. Columba Martinez based on my observation and the provider's statements to me. I, Columba Martinez MD attest that Liana Gutierrez is acting in a scribe capacity, has observed my performance of the services and has documented them in accordance with my direction.       Columba Martinez MD  09/25/23 2171

## 2023-09-25 NOTE — Clinical Note
Morris Ashwin was seen and treated in our emergency department on 9/25/2023.  He may return to work on 09/28/2023.       If you have any questions or concerns, please don't hesitate to call.      Columba Martinez MD

## 2023-09-26 ENCOUNTER — PATIENT OUTREACH (OUTPATIENT)
Dept: CARE COORDINATION | Facility: CLINIC | Age: 53
End: 2023-09-26
Payer: COMMERCIAL

## 2023-09-26 NOTE — PROGRESS NOTES
Clinic Care Coordination Contact  Community Health Worker Initial Outreach    CHW Initial Information Gathering:  Referral Source: ED Follow-Up  Preferred Hospital: [facility]  [phone]  Preferred Urgent Care: [facility], [phone]  No PCP office visit in Past Year: No       Patient accepts CC: No, due to the patient stating that at this time there are no concerns for CCC to assist with. Patient will be sent Care Coordination introduction letter for future reference.     MARTINE Major  [phone]  Connected Care Resource [facility]

## 2023-09-26 NOTE — LETTER
[facility] CARE COORDINATION      September 26, 2023    Morris Christopher  [address] [address]      Dear Morris,        I am a  clinic community health worker who works with Physician Alina Ref-Primary with the [facility]. I wanted to thank you for spending the time to talk with me.  Below is a description of clinic care coordination and how we can further assist you.       The clinic care coordination team is made up of a registered nurse, , financial resource worker and community health worker who understand the health care system. The goal of clinic care coordination is to help you manage your health and improve access to the health care system. Our team works alongside your provider to assist you in determining your health and social needs. We can help you obtain health care and community resources, providing you with necessary information and education. We can work with you through any barriers and develop a care plan that helps coordinate and strengthen the communication between you and your care team.  Our services are voluntary and are offered without charge to you personally.    If you wish to connect with the Clinic Care Coordination Team, please let your [facility] Primary Care Provider or Clinic Care Team know and they can place a referral. The Clinic Care Coordination team will then reach out by phone to further support you.    We are focused on providing you with the highest-quality healthcare experience possible.    Sincerely,   Your care team with [facility]

## 2023-09-27 ENCOUNTER — HOSPITAL ENCOUNTER (OUTPATIENT)
Dept: MRI IMAGING | Facility: CLINIC | Age: 53
Discharge: HOME | End: 2023-09-27
Attending: NURSE PRACTITIONER | Admitting: NURSE PRACTITIONER
Payer: COMMERCIAL

## 2023-09-27 ENCOUNTER — TELEPHONE (OUTPATIENT)
Dept: PHYSICAL MEDICINE AND REHAB | Facility: CLINIC | Age: 53
End: 2023-09-27
Payer: COMMERCIAL

## 2023-09-27 DIAGNOSIS — M54.16 LEFT LUMBAR RADICULOPATHY: ICD-10-CM

## 2023-09-27 DIAGNOSIS — M54.16 LEFT LUMBAR RADICULOPATHY: Primary | ICD-10-CM

## 2023-09-27 PROCEDURE — 72148 MRI LUMBAR SPINE W/O DYE: CPT

## 2023-09-27 NOTE — TELEPHONE ENCOUNTER
"Phone call to patient to review results and provider's recommendations. Results given and explained. He would like to get the injection as he is not getting any relief with Medrol dosepack or Tylenol. Order placed in Epic for left L4-5 TF JORGE. Injection requirements reviewed in full with patient. Discussed steroids have immunosuppressant properties which could potentially put patient at a higher risk for a worsened course of any infection including COVID. Stated understanding.     Patient did ask about something more for pain. \"I am not working and am using a walker to get around my house right now.\" Explained policy of clinic that appointment needed for medication management of narcotics; \"If she feels that is what will help me. I haven't slept in 3 days from the pain.\"     Transferred to scheduling to make video appointment for follow up and also injection appointment.    "

## 2023-09-27 NOTE — TELEPHONE ENCOUNTER
----- Message from JL Barajas CNP sent at 9/27/2023 12:04 PM CDT -----  Please let Gagan know that his new lumbar MRI shows some progression of his prior L4-5 disc herniation. The disc is now a bit bigger and causing severe compression on the left L5 nerve root. If he has not improved with medications so far, I would recommend a left L4-5 TF JORGE. I can place orders if he agrees.

## 2023-09-28 ENCOUNTER — MYC MEDICAL ADVICE (OUTPATIENT)
Dept: PHYSICAL MEDICINE AND REHAB | Facility: CLINIC | Age: 53
End: 2023-09-28

## 2023-09-28 DIAGNOSIS — M54.16 LEFT LUMBAR RADICULOPATHY: Primary | ICD-10-CM

## 2023-09-28 RX ORDER — OXYCODONE HYDROCHLORIDE 5 MG/1
5-10 TABLET ORAL EVERY 6 HOURS PRN
Qty: 20 TABLET | Refills: 0 | Status: SHIPPED | OUTPATIENT
Start: 2023-09-28 | End: 2023-10-13

## 2023-09-28 RX ORDER — METHOCARBAMOL 500 MG/1
500 TABLET, FILM COATED ORAL 4 TIMES DAILY PRN
Qty: 40 TABLET | Refills: 1 | Status: SHIPPED | OUTPATIENT
Start: 2023-09-28 | End: 2023-10-31

## 2023-09-28 RX ORDER — GABAPENTIN 300 MG/1
300 CAPSULE ORAL 3 TIMES DAILY
Qty: 90 CAPSULE | Refills: 0 | Status: SHIPPED | OUTPATIENT
Start: 2023-09-28 | End: 2023-11-07

## 2023-09-28 NOTE — TELEPHONE ENCOUNTER
Rx for oxycodone sent.  Recommend adding robaxin 500mg QID PRN, which I sent.  Would recommend increasing current gabapentin.  I sent 300mg tabs to start at TID now, then increase to 600mg TID per titration schedule.

## 2023-09-28 NOTE — TELEPHONE ENCOUNTER
Chart reviewed. PSP had noted she would send in a 1 time Rx of Oxycodone with any refills from PCP. However, no new prescription noted.    Phone call to patient to discuss. States he has only been taking Ibuprofen and Tylenol without relief. He does not have any muscle relaxant on list of medications either. Would appreciate the Rx being sent in as he would then save $30 in a co-pay for the not needed appointment of this afternoon; asks that it be cancelled if Rx sent in. Pharmacy to use is the [facility].     Does report he has set up an appointment to establish PCP on 10/10/23.

## 2023-09-28 NOTE — TELEPHONE ENCOUNTER
Phone call to patient to inform him of new prescriptions sent. States he has already picked them up and has a pain pill on board. Encouraged pt to alternate 2 Extra Strength Tylenol tablets with 400-600 mg Ibuprofen with food every 8 hours and use Oxycodone for severe pain. Stated understanding.

## 2023-10-02 ASSESSMENT — PATIENT HEALTH QUESTIONNAIRE - PHQ9
SUM OF ALL RESPONSES TO PHQ QUESTIONS 1-9: 7
10. IF YOU CHECKED OFF ANY PROBLEMS, HOW DIFFICULT HAVE THESE PROBLEMS MADE IT FOR YOU TO DO YOUR WORK, TAKE CARE OF THINGS AT HOME, OR GET ALONG WITH OTHER PEOPLE: SOMEWHAT DIFFICULT
SUM OF ALL RESPONSES TO PHQ QUESTIONS 1-9: 7

## 2023-10-03 ENCOUNTER — OFFICE VISIT (OUTPATIENT)
Dept: FAMILY MEDICINE | Facility: CLINIC | Age: 53
End: 2023-10-03
Payer: COMMERCIAL

## 2023-10-03 VITALS
TEMPERATURE: 97.6 F | DIASTOLIC BLOOD PRESSURE: 88 MMHG | BODY MASS INDEX: 34.46 KG/M2 | HEIGHT: 76 IN | SYSTOLIC BLOOD PRESSURE: 144 MMHG | HEART RATE: 103 BPM | OXYGEN SATURATION: 97 % | WEIGHT: 283 LBS

## 2023-10-03 DIAGNOSIS — M54.16 LEFT LUMBAR RADICULOPATHY: Primary | ICD-10-CM

## 2023-10-03 DIAGNOSIS — R29.898 LEFT LEG WEAKNESS: ICD-10-CM

## 2023-10-03 PROCEDURE — 99417 PROLNG OP E/M EACH 15 MIN: CPT | Performed by: PHYSICIAN ASSISTANT

## 2023-10-03 PROCEDURE — 99215 OFFICE O/P EST HI 40 MIN: CPT | Performed by: PHYSICIAN ASSISTANT

## 2023-10-03 RX ORDER — OXYCODONE HYDROCHLORIDE 5 MG/1
5-10 TABLET ORAL EVERY 4 HOURS PRN
Qty: 30 TABLET | Refills: 0 | Status: SHIPPED | OUTPATIENT
Start: 2023-10-03 | End: 2023-10-16

## 2023-10-03 NOTE — PROGRESS NOTES
Assessment & Plan       ICD-10-CM    1. Left lumbar radiculopathy  M54.16 XR Pelvis and Hip Left 1 View     oxyCODONE (ROXICODONE) 5 MG tablet      2. Left leg weakness  R29.898 XR Pelvis and Hip Left 1 View     oxyCODONE (ROXICODONE) 5 MG tablet        Patient in significant pain - gait altered - using a walker but even with the walker he is trying to avoid as much as possible putting weight on the left leg. He has had chronic back issues in the past but notes this is very different than previous episodes which typically resolve with rest, conservative therapy and time. He did sustain a fall landing hard on the ball/hitch of his car. While they did do xrays in the ED they did not get a full pelvis/hip xray and I have enough concerns with the lack of pain control and his symptoms that we could be missing something so wanted to get a pelvis w/left hip xray in clinic today. Patient, however was not able to lay down for the xray. He was given the number to call and hopefully schedule this at a location where they can do a stand up image.    Follow up - injection scheduled for 10/13 (soonest available given insurance approval). Any additional follow up pending results of xrays     Pain control - limited pain relief with oxycodone but discussed there are not many different/better options outpatient. He is just hoping to get some sleep. Will continue oxycodone at this point. He has no recent history of opioid abuse or misuse that I can see    We discussed concerning signs and features for which I would want him to go back to the ED (pain that isn't controlled, loss of bowel or bladder function, saddle anesthesia)    We completed his forms today - he was hoping to go back to work 2 days after his injection. I think this might be a lofty goal given the level his pain is at currently and that injections often take up to 2 weeks to work. We agreed upon being out of work 2 weeks after the injection and if he is able to go  "back sooner we can adjust as needed        69 minutes spent by me on the date of the encounter doing chart review, review of outside records, review of test results, patient visit, and documentation      MED REC REQUIRED  Post Medication Reconciliation Status: discharge medications reconciled and changed, per note/orders  BMI:   Estimated body mass index is 34.45 kg/m  as calculated from the following:    Height as of this encounter: 1.93 m (6' 4\").    Weight as of this encounter: 128.4 kg (283 lb).       MEDICATIONS:    Continue current medications without change  Refilled oxycodone  He will continue the gabapentin increase as discussed with spine    Salima Zhang PA-C  [facility] RUDI Farah is a 53 year old, presenting for the following health issues:  ER F/U        10/3/2023     2:11 PM   Additional Questions   Roomed by KATELYNN Vega       [facility]       ED/UC Followup:    Facility:  [facility] ER  Date of visit: 9/25/2023  Reason for visit: Low Back Pain  Current Status: It is getting worse. He is set up got cortisone injections on 10/13/23. They want his primary to handle the pain management.     Initial back injury back in April - more consistent with the episodic issues he has had with his low back since his 20's  Did see a spine specialist at that time and surgery not indicated  Did recover with time (as it usually does) and was to go back to work although then got COVID which did delay his return to work    Post COVID was scheduled to go back to work again but then had a fall - tripped over his dogs and fell landing with his left back on the hitch/ball of his car  Injury/fall was on 9/14  Pain became so severe he finally went to the ED again on 9/19 (worked 9/18 and had a very difficult time)    Hasn't been able to use his left leg since  Can't stand on it, numb, weak  If he sits he can get somewhat comfortable but standing or laying down he is in severe " "pain  Currently he is sleeping and spending all of his time in a lawn chair in the middle of his living room as that is the easiest to get in and out of   Not sleeping at all     MRI obtained (not ordered in ED, ordered by his department at work given his level of pain)    Per report                                                                  IMPRESSION:  1.  Transitional thoracolumbar and lumbosacral anatomy.  2.  At L4-L5, left central extrusion which displaces the descending left L5 nerve root appears mildly increased in size compared to the prior MRI.  3.  No additional high-grade spinal canal or neural foraminal stenosis.  4.  New faint Modic type I marrow signal changes anteriorly at the L2-L3 and L3-L4 endplates.    Has been seen back at the spine clinic and they gave him a small prescription for oxycodone to use for pain but deferred back to PCP for ongoing pain management    He is scheduled for an injection on 10/13    Not sleeping - cares for his disabled wife and is barely able to get around the house  Using a walker so he doesn't fall but states putting any pressure on his leg causes excruciating pain in the back, through the hip and behind the knee  Leg feels 'heavy' - like he has a very heavy blanket on it that he can't move off    Gabapentin is being titrated up - currently at 400mg three times daily with goal to get to 600mg three times daily  On cymbalta - changed about a year ago for depression  Taking robaxin    Not managing pain  He did take the oxycodone - started with 2 tabs (10mg) and then took 1 every 4-6 hours - barely dulled the pain    Review of Systems   Remainder of ROS obtained and found to be negative other than that which was documented above        Objective    BP (!) 144/88   Pulse 103   Temp 97.6  F (36.4  C) (Tympanic)   Ht 1.93 m (6' 4\")   Wt 128.4 kg (283 lb)   SpO2 97%   BMI 34.45 kg/m    Body mass index is 34.45 kg/m .  Physical Exam   GENERAL: healthy, alert and no " distress  BACK, HIP, LEG:   Altered gait due to pain, shifted weight to right side  Exam very limited due to pain  Sensation intact although decreased on left side  Able to move left foot normally    Diagnostic Tests:   pending        Answers submitted by the patient for this visit:  Patient Health Questionnaire (Submitted on 10/2/2023)  If you checked off any problems, how difficult have these problems made it for you to do your work, take care of things at home, or get along with other people?: Somewhat difficult  PHQ9 TOTAL SCORE: 7

## 2023-10-13 ENCOUNTER — RADIOLOGY INJECTION OFFICE VISIT (OUTPATIENT)
Dept: PHYSICAL MEDICINE AND REHAB | Facility: CLINIC | Age: 53
End: 2023-10-13
Attending: NURSE PRACTITIONER
Payer: COMMERCIAL

## 2023-10-13 VITALS
TEMPERATURE: 97.9 F | HEART RATE: 86 BPM | RESPIRATION RATE: 16 BRPM | SYSTOLIC BLOOD PRESSURE: 136 MMHG | OXYGEN SATURATION: 98 % | DIASTOLIC BLOOD PRESSURE: 74 MMHG

## 2023-10-13 DIAGNOSIS — E11.9 TYPE 2 DIABETES MELLITUS WITHOUT COMPLICATION, WITHOUT LONG-TERM CURRENT USE OF INSULIN (H): Primary | ICD-10-CM

## 2023-10-13 DIAGNOSIS — M54.16 LEFT LUMBAR RADICULOPATHY: ICD-10-CM

## 2023-10-13 LAB — GLUCOSE SERPL-MCNC: 353 MG/DL (ref 70–99)

## 2023-10-13 PROCEDURE — 82962 GLUCOSE BLOOD TEST: CPT | Performed by: PAIN MEDICINE

## 2023-10-13 PROCEDURE — 64483 NJX AA&/STRD TFRM EPI L/S 1: CPT | Mod: LT | Performed by: PAIN MEDICINE

## 2023-10-13 RX ORDER — LIDOCAINE HYDROCHLORIDE 10 MG/ML
INJECTION, SOLUTION EPIDURAL; INFILTRATION; INTRACAUDAL; PERINEURAL
Status: COMPLETED | OUTPATIENT
Start: 2023-10-13 | End: 2023-10-13

## 2023-10-13 RX ORDER — DEXAMETHASONE SODIUM PHOSPHATE 10 MG/ML
INJECTION, SOLUTION INTRAMUSCULAR; INTRAVENOUS
Status: COMPLETED | OUTPATIENT
Start: 2023-10-13 | End: 2023-10-13

## 2023-10-13 RX ADMIN — LIDOCAINE HYDROCHLORIDE 2 ML: 10 INJECTION, SOLUTION EPIDURAL; INFILTRATION; INTRACAUDAL; PERINEURAL at 08:03

## 2023-10-13 RX ADMIN — DEXAMETHASONE SODIUM PHOSPHATE 10 MG: 10 INJECTION, SOLUTION INTRAMUSCULAR; INTRAVENOUS at 08:03

## 2023-10-13 ASSESSMENT — PAIN SCALES - GENERAL
PAINLEVEL: MILD PAIN (3)
PAINLEVEL: SEVERE PAIN (7)

## 2023-10-13 NOTE — PATIENT INSTRUCTIONS
DISCHARGE INSTRUCTIONS    During office hours (8:00 a.m.- 4:00 p.m.) questions or concerns may be answered  by calling Spine Center Navigation Nurses at  [phone].  Messages received after hours will be returned the following business day.      In the case of an emergency, please dial 911 or seek assistance at the nearest Emergency Room/Urgent Care facility.     All Patients:    You may experience an increase in your symptoms for the first 2 days (It may take anywhere between 2 days- 2 weeks for the steroid to have maximum effect).    You may use ice on the injection site, as frequently as 20 minutes each hour if needed.    You may take your pain medicine.    You may continue taking your regular medication after your injection. If you have had a Medial Branch Block you may resume pain medication once your pain diary is completed.    You may shower. No swimming, tub bath or hot tub for 48 hours.  You may remove your bandaid/bandage as soon as you are home.    You may resume light activities, as tolerated.    Resume your usual diet as tolerated.    It is strongly advised that you do not drive for 1-3 hours post injection.    If you have had oral sedation:  Do not drive for 8 hours post injection.      If you have had IV sedation:  Do not drive for 24 hours post injection.  Do not operate hazardous machinery or make important personal/business decisions for 24 hours.      POSSIBLE STEROID SIDE EFFECTS (If steroid/cortisone was used for your procedure)    -If you experience these symptoms, it should only last for a short period    Swelling of the legs              Skin redness (flushing)     Mouth (oral) irritation   Blood sugar (glucose) levels            Sweats                    Mood changes  Headache  Sleeplessness  Weakened immune system for up to 14 days, which could increase the risk of olivia the COVID-19 virus and/or experiencing more severe symptoms of the disease, if exposed.  Decreased  effectiveness of the flu vaccine if given within 2 weeks of the steroid.         POSSIBLE PROCEDURE SIDE EFFECTS  -Call the Spine Center if you are concerned  Increased Pain           Increased numbness/tingling      Nausea/Vomiting          Bruising/bleeding at site      Redness or swelling                                              Difficulty walking      Weakness           Fever greater than 100.5    *In the event of a severe headache after an epidural steroid injection that is relieved by lying down, please call the [facility] to speak with a clinical staff member*     Please be advised that your blood glucose levels may be increased for the next 5-7 days as a result of the steroid you received with your injection today.  It is recommended that you monitor your blood glucose levels closely over the next week and direct any additional questions regarding your blood glucose management to your primary doctor.

## 2023-10-16 ENCOUNTER — E-VISIT (OUTPATIENT)
Dept: FAMILY MEDICINE | Facility: CLINIC | Age: 53
End: 2023-10-16
Payer: COMMERCIAL

## 2023-10-16 ENCOUNTER — HOSPITAL ENCOUNTER (OUTPATIENT)
Dept: RADIOLOGY | Facility: HOSPITAL | Age: 53
Discharge: HOME | End: 2023-10-16
Attending: PHYSICIAN ASSISTANT | Admitting: PHYSICIAN ASSISTANT
Payer: COMMERCIAL

## 2023-10-16 DIAGNOSIS — R29.898 LEFT LEG WEAKNESS: ICD-10-CM

## 2023-10-16 DIAGNOSIS — M54.16 LEFT LUMBAR RADICULOPATHY: ICD-10-CM

## 2023-10-16 PROCEDURE — 73502 X-RAY EXAM HIP UNI 2-3 VIEWS: CPT

## 2023-10-16 PROCEDURE — 99207 PR NON-BILLABLE SERV PER CHARTING: CPT | Performed by: PHYSICIAN ASSISTANT

## 2023-10-16 RX ORDER — OXYCODONE HYDROCHLORIDE 5 MG/1
5-10 TABLET ORAL EVERY 4 HOURS PRN
Qty: 27 TABLET | Refills: 0 | Status: SHIPPED | OUTPATIENT
Start: 2023-10-16 | End: 2023-10-23

## 2023-10-16 NOTE — TELEPHONE ENCOUNTER
Call placed to patient  Relayed Yonatan's message    Telephone appointment scheduled for 10/17/2023 at 1340 to discuss forms     Patient verbalized understanding  No further questions/concerns    Kulwant Harvey RN

## 2023-10-17 ENCOUNTER — VIRTUAL VISIT (OUTPATIENT)
Dept: FAMILY MEDICINE | Facility: CLINIC | Age: 53
End: 2023-10-17
Payer: COMMERCIAL

## 2023-10-17 DIAGNOSIS — M54.16 LUMBAR RADICULOPATHY: Primary | ICD-10-CM

## 2023-10-17 DIAGNOSIS — R29.898 LEFT LEG WEAKNESS: ICD-10-CM

## 2023-10-17 PROCEDURE — 99442 PR PHYSICIAN TELEPHONE EVALUATION 11-20 MIN: CPT | Performed by: PHYSICIAN ASSISTANT

## 2023-10-17 NOTE — PROGRESS NOTES
"Gagan is a 53 year old who is being evaluated via a billable telephone visit.      What phone number would you like to be contacted at? [phone]  How would you like to obtain your AVS? David    Distant Location (provider location):  On-site    Assessment & Plan       ICD-10-CM    1. Lumbar radiculopathy  M54.16       2. Left leg weakness  R29.898         Reviewed [facility] and [employer] forms that had already been completed.   Estimated return to work date was 10/28 on all forms so unclear why [facility] states he is only good through 10/18. He will call them and verify    Has follow up with spine on 10/30 (soonest they wanted to see him)    Refilled oxycodone yesterday    Still hopeful that pain will start to improve and injection will start working             BMI:   Estimated body mass index is 34.45 kg/m  as calculated from the following:    Height as of 10/3/23: 1.93 m (6' 4\").    Weight as of 10/3/23: 128.4 kg (283 lb).           Salima Zhang PA-C  [facility]    Subjective   Gagan is a 53 year old, presenting for the following health issues:  Forms        10/17/2023     1:33 PM   Additional Questions   Roomed by KATELYNN Vega       HPI     He needs forms refilled out. The ones you filled out for him will  on 10/18/2023.    Things seemed to be getting better leading up to the injection - was able to start walking without the walker and pain was not shooting down his leg    Went in for the shots and immediately had severe pain afterwards  Now back to using his walker  Pain is back down the leg  Can only make it 20 feet before he needs to stop    Frustrated that he can't go back to work          Review of Systems   Remainder of ROS obtained and found to be negative other than that which was documented above        Objective           Vitals:  No vitals were obtained today due to virtual visit.    Physical Exam   alert and no distress  PSYCH: Alert and oriented times 3; coherent speech, normal "   rate and volume, able to articulate logical thoughts, able   to abstract reason, no tangential thoughts, no hallucinations   or delusions  His affect is normal  RESP: No cough, no audible wheezing, able to talk in full sentences  Remainder of exam unable to be completed due to telephone visits      Phone call duration: 15 minutes

## 2023-10-21 ENCOUNTER — HEALTH MAINTENANCE LETTER (OUTPATIENT)
Age: 53
End: 2023-10-21

## 2023-10-23 ENCOUNTER — MYC REFILL (OUTPATIENT)
Dept: FAMILY MEDICINE | Facility: CLINIC | Age: 53
End: 2023-10-23
Payer: COMMERCIAL

## 2023-10-23 DIAGNOSIS — R29.898 LEFT LEG WEAKNESS: ICD-10-CM

## 2023-10-23 DIAGNOSIS — E29.1 HYPOGONADISM MALE: ICD-10-CM

## 2023-10-23 DIAGNOSIS — M54.16 LEFT LUMBAR RADICULOPATHY: ICD-10-CM

## 2023-10-23 RX ORDER — SYRINGE WITH NEEDLE, 1 ML 25GX5/8"
SYRINGE, EMPTY DISPOSABLE MISCELLANEOUS
Qty: 6 EACH | Refills: 3 | Status: CANCELLED | OUTPATIENT
Start: 2023-10-23

## 2023-10-24 RX ORDER — NEEDLES, DISPOSABLE 25GX5/8"
NEEDLE, DISPOSABLE MISCELLANEOUS
Qty: 6 EACH | Refills: 3 | Status: SHIPPED | OUTPATIENT
Start: 2023-10-24

## 2023-10-25 RX ORDER — OXYCODONE HYDROCHLORIDE 5 MG/1
5-10 TABLET ORAL EVERY 4 HOURS PRN
Qty: 27 TABLET | Refills: 0 | Status: SHIPPED | OUTPATIENT
Start: 2023-10-25 | End: 2023-11-01

## 2023-10-27 DIAGNOSIS — R29.898 LEFT LEG WEAKNESS: ICD-10-CM

## 2023-10-27 DIAGNOSIS — M54.16 LUMBAR RADICULOPATHY: Primary | ICD-10-CM

## 2023-10-30 DIAGNOSIS — M54.9 BACK PAIN, UNSPECIFIED BACK LOCATION, UNSPECIFIED BACK PAIN LATERALITY, UNSPECIFIED CHRONICITY: Primary | ICD-10-CM

## 2023-10-31 ENCOUNTER — MYC REFILL (OUTPATIENT)
Dept: PHYSICAL MEDICINE AND REHAB | Facility: CLINIC | Age: 53
End: 2023-10-31
Payer: COMMERCIAL

## 2023-10-31 ENCOUNTER — MYC REFILL (OUTPATIENT)
Dept: FAMILY MEDICINE | Facility: CLINIC | Age: 53
End: 2023-10-31
Payer: COMMERCIAL

## 2023-10-31 DIAGNOSIS — M54.16 LEFT LUMBAR RADICULOPATHY: ICD-10-CM

## 2023-10-31 DIAGNOSIS — R29.898 LEFT LEG WEAKNESS: ICD-10-CM

## 2023-10-31 RX ORDER — OXYCODONE HYDROCHLORIDE 5 MG/1
5-10 TABLET ORAL EVERY 4 HOURS PRN
Qty: 27 TABLET | Refills: 0 | Status: CANCELLED | OUTPATIENT
Start: 2023-10-31

## 2023-10-31 RX ORDER — METHOCARBAMOL 500 MG/1
500 TABLET, FILM COATED ORAL 4 TIMES DAILY PRN
Qty: 40 TABLET | Refills: 1 | Status: SHIPPED | OUTPATIENT
Start: 2023-10-31 | End: 2024-01-16

## 2023-11-01 ENCOUNTER — HOSPITAL ENCOUNTER (OUTPATIENT)
Dept: RADIOLOGY | Facility: HOSPITAL | Age: 53
Discharge: HOME | End: 2023-11-01
Attending: SURGERY | Admitting: SURGERY
Payer: COMMERCIAL

## 2023-11-01 DIAGNOSIS — M54.9 BACK PAIN, UNSPECIFIED BACK LOCATION, UNSPECIFIED BACK PAIN LATERALITY, UNSPECIFIED CHRONICITY: ICD-10-CM

## 2023-11-01 PROCEDURE — 72110 X-RAY EXAM L-2 SPINE 4/>VWS: CPT

## 2023-11-02 ENCOUNTER — OFFICE VISIT (OUTPATIENT)
Dept: NEUROSURGERY | Facility: CLINIC | Age: 53
End: 2023-11-02
Attending: PHYSICIAN ASSISTANT
Payer: COMMERCIAL

## 2023-11-02 ENCOUNTER — MYC MEDICAL ADVICE (OUTPATIENT)
Dept: FAMILY MEDICINE | Facility: CLINIC | Age: 53
End: 2023-11-02

## 2023-11-02 ENCOUNTER — PREP FOR PROCEDURE (OUTPATIENT)
Dept: NEUROSURGERY | Facility: CLINIC | Age: 53
End: 2023-11-02

## 2023-11-02 VITALS — OXYGEN SATURATION: 99 % | HEART RATE: 84 BPM | DIASTOLIC BLOOD PRESSURE: 91 MMHG | SYSTOLIC BLOOD PRESSURE: 167 MMHG

## 2023-11-02 DIAGNOSIS — M54.16 LEFT LUMBAR RADICULOPATHY: ICD-10-CM

## 2023-11-02 DIAGNOSIS — R53.1 WEAKNESS: Primary | ICD-10-CM

## 2023-11-02 DIAGNOSIS — R29.898 LEFT LEG WEAKNESS: ICD-10-CM

## 2023-11-02 DIAGNOSIS — M54.16 LUMBAR RADICULOPATHY: ICD-10-CM

## 2023-11-02 DIAGNOSIS — M54.16 LUMBAR RADICULOPATHY: Primary | ICD-10-CM

## 2023-11-02 PROCEDURE — 99214 OFFICE O/P EST MOD 30 MIN: CPT | Performed by: SURGERY

## 2023-11-02 ASSESSMENT — PAIN SCALES - GENERAL: PAINLEVEL: EXTREME PAIN (8)

## 2023-11-02 NOTE — NURSING NOTE
"Morris Christopher is a 53 year old male who presents for:  Chief Complaint   Patient presents with    Consult     Lumbar  Low back pain, down left leg  W/n/t left leg        Initial Vitals:  BP (!) 167/91   Pulse 84   SpO2 99%  Estimated body mass index is 34.45 kg/m  as calculated from the following:    Height as of 10/3/23: 6' 4\" (1.93 m).    Weight as of 10/3/23: 283 lb (128.4 kg).. There is no height or weight on file to calculate BSA. BP completed using cuff size: regular  Extreme Pain (8)    Nursing Comments: Gagan to follow up with Primary Care provider regarding elevated blood pressure.      Gokul Kesslre  "

## 2023-11-02 NOTE — PROGRESS NOTES
NEUROSURGERY CONSULTATION NOTE      Neurosurgery was asked to see this patient by Salima Zhang* for evaluation of lumbar radiculopathy.       CONSULTATION ASSESSMENT AND PLAN:    52 yo male who presents with low back and left leg pain as well as numbness and tingling and weakness. Lumbar xray shows sacralization of lumbar 5 with normal alignment and no instability. MRI of his lumbar spine shows a left paracentral disc herniation at lumbar 4-5 resulting in L5 nerve impingement which progressed since prior MRI. Due to severity of symptoms including weakness and sensroy loss do not recommend further conservative management. Recommend left lumbar 4-lumbar 5 hemilaminectomy, medial facetectomy, foraminotomies and microdiscectomy. Risks and benefits of lumbar decompression with microdiscectomy discussed including but not limited to infection, hematoma, nerve damage including paralysis, post op radiculitis, durotomy, recurrent disc herniation,  risks associated with the use of general anesthesia, blood clots in the lungs or legs. We also discussed given he has history of more chronic back pain he may likely have residual chronic back pain following the procedure. He appeared to understand and agreed to proceed.     Evelni Grover MD      HPI:  52 yo male who presents with low back and left leg pain as well as numbness and tingling and weakness. Chronic low back pain. Fell onto a trailer hitch and he has been in pain since that time. Left buttock and back into posterolateral  left leg to his ankle. Pain is accompanied by numbness and tingling. Left leg feels weak. Sitting will improve his pain. But sitting too long can cause pain as well. Using walker and cane to help assist with this as well. He was then seen in the emergency room on 9/18 due to severity of symptoms. He denies bowel or bladder loss.     Methocarbamol- no relief  Ibuprofen- very little relief  Medrol dose pack-no improvement  Oxycodone -  will take the edge off  Spinal injection worsened symptoms for 1 week after initial improvement for few hours. Then his symptoms return to his baseline. No improvement  Chronic Cymbalta and gabapentin.     Saw Dr Berrios last spring for his chronic low back pain.     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 300 each 3    blood glucose (ACCU-CHEK GUIDE) test strip Use to test blood sugar 2 times daily or as directed. 100 strip 11    blood glucose monitoring (FREESTYLE) lancets Use to test blood sugar 3 times daily. 300 each 3    DULoxetine (CYMBALTA) 30 MG capsule Take 1 tablet by mouth daily along with a 60 mg tablet for a total of 90 mg daily 90 capsule 3    DULoxetine (CYMBALTA) 60 MG capsule TAKE ONE CAPSULE BY MOUTH ONCE DAILY 90 capsule 1    gabapentin (NEURONTIN) 100 MG capsule Take 2 capsules (200 mg) by mouth 3 times daily 180 capsule 3    gabapentin (NEURONTIN) 300 MG capsule Take 1 capsule (300 mg) by mouth 3 times daily Increase per titration schedule to 600mg three times daily 90 capsule 0    glipiZIDE (GLUCOTROL) 10 MG tablet TAKE 1 TABLET (10 MG) BY MOUTH 2 TIMES DAILY (BEFORE MEALS) 180 tablet 0    methocarbamol (ROBAXIN) 500 MG tablet Take 1 tablet (500 mg) by mouth 4 times daily as needed for muscle spasms 40 tablet 1    naloxone (NARCAN) 4 MG/0.1ML nasal spray Spray 1 spray (4 mg) into one nostril alternating nostrils as needed for opioid reversal every 2-3 minutes until assistance arrives 0.2 mL 0  "   Needle, Disp, (BD HYPODERMIC NEEDLE) 25G X 1-1/2\" MISC USE ONE NEEDLE EVERY 14 DAYS 6 each 3    oxyCODONE (ROXICODONE) 5 MG tablet Take 1-2 tablets (5-10 mg) by mouth every 4 hours as needed for moderate to severe pain 27 tablet 0    Syringe/Needle, Disp, (BD LUER-LOCK SYRINGE) 18G X 1-1/2\" 3 ML MISC 1 Syringe every 14 days Use 1 syringe every 14 days 6 each 3    Syringe/Needle, Disp, 18G X 1-1/2\" 3 ML MISC Use 1 syringe every 14 days 6 each 3    testosterone cypionate (DEPOTESTOSTERONE) 200 MG/ML injection Inject 0.9 mLs (180 mg) into the muscle every 14 days 2 mL 5         ALLERGIES/SENSITIVITIES:     Allergies   Allergen Reactions    Bee Venom Anaphylaxis    Lorazepam        PERTINENT SOCIAL HISTORY:   Social History     Socioeconomic History    Marital status:    Tobacco Use    Smoking status: Former    Smokeless tobacco: Current     Types: Chew     Social Determinants of Health     Financial Resource Strain: Low Risk  (10/1/2023)    Financial Resource Strain     Within the past 12 months, have you or your family members you live with been unable to get utilities (heat, electricity) when it was really needed?: No   Food Insecurity: Low Risk  (10/1/2023)    Food Insecurity     Within the past 12 months, did you worry that your food would run out before you got money to buy more?: No     Within the past 12 months, did the food you bought just not last and you didn t have money to get more?: No   Transportation Needs: Low Risk  (10/1/2023)    Transportation Needs     Within the past 12 months, has lack of transportation kept you from medical appointments, getting your medicines, non-medical meetings or appointments, work, or from getting things that you need?: No   Housing Stability: Low Risk  (10/1/2023)    Housing Stability     Do you have housing? : Yes     Are you worried about losing your housing?: No         FAMILY HISTORY:  Family History   Problem Relation Age of Onset    Lupus Mother     " Fibromyalgia Mother     Kidney Disease Mother     Clotting Disorder Mother     Depression Mother     Early Death Mother     Hypertension Father     Coronary Artery Disease Father     Diabetes Father     Arthritis Father     No Known Problems Sister     No Known Problems Brother     Diabetes Maternal Grandmother     Alcoholism Maternal Grandmother     Prostate Cancer Maternal Grandfather     Alcoholism Maternal Grandfather     Cancer Maternal Grandfather     Colon Cancer Paternal Grandmother     Arthritis Paternal Grandmother     Cancer Paternal Grandmother     Hypertension Paternal Grandmother     Substance Abuse Maternal Aunt         PHYSICAL EXAM:   Constitutional: BP (!) 167/91   Pulse 84   SpO2 99%      Mental Status: A & O in no acute distress.  Affect is appropriate.  Speech is fluent.  Recent and remote memory are intact.  Attention span and concentration are normal.     Motor: Normal bulk and tone all muscle groups of upper and lower extremities.    Strength: Left hip flexion 4+/5 as well as DF and EHL 4+/5     Sensory: Sensation intact bilaterally to light touch except diminished left L5 distribution     Coordination; antalgic stiff gait ambulates with a cane      Reflexes; supinator, biceps, triceps, knee/ ankle jerk hypo throughout . No rollins's    IMAGING:  I personally reviewed all radiographic images         Cc:   Salima Zhang

## 2023-11-02 NOTE — LETTER
11/2/2023         RE: Morris Christopher  [address] [facility] [address]        Dear Colleague,    Thank you for referring your patient, Morris Christopher, to the [facility]. Please see a copy of my visit note below.    NEUROSURGERY CONSULTATION NOTE      Neurosurgery was asked to see this patient by Salima Zhang* for evaluation of lumbar radiculopathy.       CONSULTATION ASSESSMENT AND PLAN:    54 yo male who presents with low back and left leg pain as well as numbness and tingling and weakness. Lumbar xray shows sacralization of lumbar 5 with normal alignment and no instability. MRI of his lumbar spine shows a left paracentral disc herniation at lumbar 4-5 resulting in L5 nerve impingement which progressed since prior MRI. Due to severity of symptoms including weakness and sensroy loss do not recommend further conservative management. Recommend left lumbar 4-lumbar 5 hemilaminectomy, medial facetectomy, foraminotomies and microdiscectomy. Risks and benefits of lumbar decompression with microdiscectomy discussed including but not limited to infection, hematoma, nerve damage including paralysis, post op radiculitis, durotomy, recurrent disc herniation,  risks associated with the use of general anesthesia, blood clots in the lungs or legs. We also discussed given he has history of more chronic back pain he may likely have residual chronic back pain following the procedure. He appeared to understand and agreed to proceed.     Evelin Grover MD      HPI:  54 yo male who presents with low back and left leg pain as well as numbness and tingling and weakness. Chronic low back pain. Fell onto a trailer hitch and he has been in pain since that time. Left buttock and back into posterolateral  left leg to his ankle. Pain is accompanied by numbness and tingling. Left leg feels weak. Sitting will improve his pain. But sitting too long can cause pain as well. Using  walker and cane to help assist with this as well. He was then seen in the emergency room on 9/18 due to severity of symptoms. He denies bowel or bladder loss.     Methocarbamol- no relief  Ibuprofen- very little relief  Medrol dose pack-no improvement  Oxycodone - will take the edge off  Spinal injection worsened symptoms for 1 week after initial improvement for few hours. Then his symptoms return to his baseline. No improvement  Chronic Cymbalta and gabapentin.     Saw Dr Berrios last spring for his chronic low back pain.     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 300 each 3     blood glucose (ACCU-CHEK GUIDE) test strip Use to test blood sugar 2 times daily or as directed. 100 strip 11     blood glucose monitoring (FREESTYLE) lancets Use to test blood sugar 3 times daily. 300 each 3     DULoxetine (CYMBALTA) 30 MG capsule Take 1 tablet by mouth daily along with a 60 mg tablet for a total of 90 mg daily 90 capsule 3     DULoxetine (CYMBALTA) 60 MG capsule TAKE ONE CAPSULE BY MOUTH ONCE DAILY 90 capsule 1     gabapentin (NEURONTIN) 100 MG capsule Take 2 capsules (200 mg) by mouth 3 times daily 180 capsule 3     gabapentin (NEURONTIN) 300 MG capsule Take 1 capsule (300 mg) by mouth 3 times daily Increase per titration schedule to 600mg three times daily 90 capsule 0     glipiZIDE (GLUCOTROL) 10 MG tablet TAKE 1 TABLET (10 MG) BY MOUTH 2 TIMES DAILY (BEFORE MEALS) 180 tablet 0     methocarbamol (ROBAXIN) 500  "MG tablet Take 1 tablet (500 mg) by mouth 4 times daily as needed for muscle spasms 40 tablet 1     naloxone (NARCAN) 4 MG/0.1ML nasal spray Spray 1 spray (4 mg) into one nostril alternating nostrils as needed for opioid reversal every 2-3 minutes until assistance arrives 0.2 mL 0     Needle, Disp, (BD HYPODERMIC NEEDLE) 25G X 1-1/2\" MISC USE ONE NEEDLE EVERY 14 DAYS 6 each 3     oxyCODONE (ROXICODONE) 5 MG tablet Take 1-2 tablets (5-10 mg) by mouth every 4 hours as needed for moderate to severe pain 27 tablet 0     Syringe/Needle, Disp, (BD LUER-LOCK SYRINGE) 18G X 1-1/2\" 3 ML MISC 1 Syringe every 14 days Use 1 syringe every 14 days 6 each 3     Syringe/Needle, Disp, 18G X 1-1/2\" 3 ML MISC Use 1 syringe every 14 days 6 each 3     testosterone cypionate (DEPOTESTOSTERONE) 200 MG/ML injection Inject 0.9 mLs (180 mg) into the muscle every 14 days 2 mL 5         ALLERGIES/SENSITIVITIES:     Allergies   Allergen Reactions     Bee Venom Anaphylaxis     Lorazepam        PERTINENT SOCIAL HISTORY:   Social History     Socioeconomic History     Marital status:    Tobacco Use     Smoking status: Former     Smokeless tobacco: Current     Types: Chew     Social Determinants of Health     Financial Resource Strain: Low Risk  (10/1/2023)    Financial Resource Strain      Within the past 12 months, have you or your family members you live with been unable to get utilities (heat, electricity) when it was really needed?: No   Food Insecurity: Low Risk  (10/1/2023)    Food Insecurity      Within the past 12 months, did you worry that your food would run out before you got money to buy more?: No      Within the past 12 months, did the food you bought just not last and you didn t have money to get more?: No   Transportation Needs: Low Risk  (10/1/2023)    Transportation Needs      Within the past 12 months, has lack of transportation kept you from medical appointments, getting your medicines, non-medical meetings or " appointments, work, or from getting things that you need?: No   Housing Stability: Low Risk  (10/1/2023)    Housing Stability      Do you have housing? : Yes      Are you worried about losing your housing?: No         FAMILY HISTORY:  Family History   Problem Relation Age of Onset     Lupus Mother      Fibromyalgia Mother      Kidney Disease Mother      Clotting Disorder Mother      Depression Mother      Early Death Mother      Hypertension Father      Coronary Artery Disease Father      Diabetes Father      Arthritis Father      No Known Problems Sister      No Known Problems Brother      Diabetes Maternal Grandmother      Alcoholism Maternal Grandmother      Prostate Cancer Maternal Grandfather      Alcoholism Maternal Grandfather      Cancer Maternal Grandfather      Colon Cancer Paternal Grandmother      Arthritis Paternal Grandmother      Cancer Paternal Grandmother      Hypertension Paternal Grandmother      Substance Abuse Maternal Aunt         PHYSICAL EXAM:   Constitutional: BP (!) 167/91   Pulse 84   SpO2 99%      Mental Status: A & O in no acute distress.  Affect is appropriate.  Speech is fluent.  Recent and remote memory are intact.  Attention span and concentration are normal.     Motor: Normal bulk and tone all muscle groups of upper and lower extremities.    Strength: Left hip flexion 4+/5 as well as DF and EHL 4+/5     Sensory: Sensation intact bilaterally to light touch except diminished left L5 distribution     Coordination; antalgic stiff gait ambulates with a cane      Reflexes; supinator, biceps, triceps, knee/ ankle jerk hypo throughout . No rollins's    IMAGING:  I personally reviewed all radiographic images         Cc:   Salima Zhang             Again, thank you for allowing me to participate in the care of your patient.        Sincerely,        Evelni Grover MD

## 2023-11-02 NOTE — PATIENT INSTRUCTIONS
Recommend left lumbar 4-5 microdiscectomy      Please review COMPLETE information about your proposed surgery, pre-operative requirements, post-operative course and expectations - available in a folder provided to you in clinic!    Your surgery scheduler will call you within 3 business days to begin the process of scheduling your surgery and appointments.     Pre-Operative    Pre-operative physical / Lab work with primary care physician within 30 days of surgical date. We prefer the pre-op exam to be done 2 weeks prior to surgery. We also prefer pre-op lab work be done at [facility] outpatient lab, 2 weeks prior to surgery.     If all pre-op appointments/test are not completed prior to your surgery date, you will be asked to reschedule your surgery.           As part of preparation for your upcoming procedure your primary care doctor may order a test to rule out a COVID-19 infection. This is no longer a requirement prior to surgery.     Readiness Visits    Prior to surgery, you may have a telephone or in person readiness visit with our RN team to discuss your upcomming surgery, results of your pre-op physical, and lab work.   If you will require a collar/neck brace after surgery, you will be fitted for one at your readiness visit prior to surgery (scheduled by the surgery scheduler).     Shower procedure    Hibiclens shower: Use one packet the night before surgery and one packet the morning of surgery for a whole body shower. Avoid face, hair, and genitals.      Eating/Drinking    Stop all solid foods 8 hours before surgery.  Stop all clear liquids 2 hours prior to arrival time     Clear liquids include water, clear juice, black coffee, or clear tea without milk, Gatorade, clear soda.     Medications - please refer to the pre-operative medication instructions sheet in your folder    Hold Aspirin, NSAIDs (Advil/Ibuprofen, Indocin, Naproxen,Nuprin,Relafen/Nabumetone, Diclofenac,Meloxicam, Aleve,  Celebrex) and all vitamins and supplements x 7-10 days prior to surgical date  You can take Tylenol (Acetaminophen) for pain up until the date of your surgery   Do not exceed 3,000 mg per day   Any other medications prescribed, please discuss with your primary care provider at your pre-operative physical. Please discuss when/if it is safe for you to stop taking blood thinners with your primary care provider.   We will NOT provide pain medications prior to surgery. We will prescribe post-op pain medications for up to 6 weeks after surgery.       FMLA/Short-term disability    If you are currently employed, you will likely need to be off work for 4-6 weeks for post-op recovery and healing.  Please fax any FMLA/short term disability paperwork to [phone], mail it into the clinic, drop it off in person, or send via a [facility] message.   You may also call our clinic with the date in which you'd like to return to work, and we can provide a work letter to your employer  We will support Short-Term Disability up to 12 weeks, beginning the date of your surgery. We do NOT support Long-Term Disability/Social Security Benefits.     Pain Management after surgery    Dealing with pain    As your body heals, you might feel a stabbing, burning, or aching pain. You may also have some numbness.  Everyone feels pain differently, we may ask you to rate your pain using a pain scale. This will let us know how much pain you feel.   Keep in mind that medicine won't take away all of your pain. It helps to try other ways to relax and ease pain.     Things to help with pain    After surgery, we will give you medicine for your pain. These medications work well, but they can make you drowsy, itchy, or sick to your stomach, and constipated. Try to take narcotics with food if they cause nausea.   For mild to moderate pain, you can take medication such as Tylenol or Ibuprofen. These can be used with narcotics and muscle relaxants. *If you have had  a fusion: do NOT use NSAIDs for 6 months after surgery.   Do NOT drive while taking narcotic pain medication  Do NOT drink alcohol while using narcotic pain medication  You can utilize ice as needed (no longer than 20 minutes at one time) you may apply this over your covered incision  Utilize heat for muscle spasms, do not apply heat over your incision  If a muscle relaxer is prescribed, please do NOT take it at the same time as your narcotic pain medication. Take them at least 90 minutes apart.   You may also use pain cream/patches on sore muscles. Do NOT apply these on your incision. Patches may be cut in 1/2 if needed.     *After your surgery, if you will be staying in-patient, a nursing team will be monitoring you closely throughout your stay and communicate your health status to your surgeon and other providers.  You will be seen by Advanced Practice Providers (e.g., nurse practitioners, clinic nurse specialists, and physician assistants) who will check on you regularly to assess the status of your surgical recovery.     Incision Care    Look at your incision site every day. You  may need a mirror, or family member to help you.   Do not submerge your incision in water such as pools, hot tubs, baths for at least 6 weeks or until incision is healed  You may get your incision wet in the shower. Allow water and soap to run over incision, and gently pat dry.   Remove the dressing the day after you are discharged from the hospital. Keep the incision clean and dry at all times. This may require several bandage changes.   Contact us right away if you have:   Fever over 101 degrees farenheit  Green or yellow drainage (pus) from your incision or increased bloody drainage   Redness, swelling, or warmth at your surgery site   Notify the clinic [phone].    Activity Restrictions    For the first 6 weeks, no lifting,pushing, or pulling > 5-10 pounds, no bending, twisting.  Use the stairs in moderation   Walking: Walking  is the best way to start exercise after surgery. Take short frequent walks. You may gradually increase the distance as tolerated. If you feel pain, decrease your activity, but DO NOT stop walking. Walking will help you regain strength.  Avoid prolonged positioning for longer than 30 minutes (change positions frequently while awake)  No contact sports until after follow up visit  No high impact activities such as; running/jogging, snowmobile or 4 aragon riding or any other recreational vehicles until deemed safe by your surgeon/care team.   Please call the clinic if you develop any of the following symptoms:  Swelling and/or warmth in one or both legs  Pain or tenderness in your leg, ankle, foot, or arm   Red or discolored/pale skin     Post-Op Follow Up Appointments    We will call you to schedule these appointments after your discharge from the hospital.   Incision assessment within 2 weeks with a Registered Nurse   6 week post-op with a Nurse Practitioner/Physician Assistant. Your surgeon will be available on this day.

## 2023-11-03 ENCOUNTER — TELEPHONE (OUTPATIENT)
Dept: NEUROSURGERY | Facility: CLINIC | Age: 53
End: 2023-11-03
Payer: COMMERCIAL

## 2023-11-03 NOTE — TELEPHONE ENCOUNTER
Patient is scheduled for surgery on 11/29/23. I gave the patient surgery details, and he verbalized understanding.

## 2023-11-05 RX ORDER — OXYCODONE HYDROCHLORIDE 5 MG/1
5-10 TABLET ORAL EVERY 4 HOURS PRN
Qty: 60 TABLET | Refills: 0 | Status: SHIPPED | OUTPATIENT
Start: 2023-11-05 | End: 2023-11-28

## 2023-11-07 ENCOUNTER — MYC REFILL (OUTPATIENT)
Dept: FAMILY MEDICINE | Facility: CLINIC | Age: 53
End: 2023-11-07
Payer: COMMERCIAL

## 2023-11-07 ENCOUNTER — MYC REFILL (OUTPATIENT)
Dept: PHARMACY | Facility: CLINIC | Age: 53
End: 2023-11-07
Payer: COMMERCIAL

## 2023-11-07 DIAGNOSIS — E11.9 TYPE 2 DIABETES MELLITUS WITHOUT COMPLICATION, WITHOUT LONG-TERM CURRENT USE OF INSULIN (H): ICD-10-CM

## 2023-11-07 DIAGNOSIS — E29.1 HYPOGONADISM MALE: ICD-10-CM

## 2023-11-08 RX ORDER — BLOOD SUGAR DIAGNOSTIC
STRIP MISCELLANEOUS
Qty: 100 STRIP | Refills: 1 | Status: SHIPPED | OUTPATIENT
Start: 2023-11-08 | End: 2024-01-16

## 2023-11-10 ENCOUNTER — TELEPHONE (OUTPATIENT)
Dept: FAMILY MEDICINE | Facility: CLINIC | Age: 53
End: 2023-11-10
Payer: COMMERCIAL

## 2023-11-10 DIAGNOSIS — E11.9 TYPE 2 DIABETES MELLITUS WITHOUT COMPLICATION, WITHOUT LONG-TERM CURRENT USE OF INSULIN (H): Primary | ICD-10-CM

## 2023-11-10 RX ORDER — LANCETS
EACH MISCELLANEOUS
Qty: 100 EACH | Refills: 6 | Status: SHIPPED | OUTPATIENT
Start: 2023-11-10 | End: 2024-01-16

## 2023-11-10 NOTE — TELEPHONE ENCOUNTER
Hello,    This is [facility]. Patient's Accu-Chek is no longer covered under their insurance. Is it possible to get a replacement prescription that is covered under insurance?    If you have any questions, give us a miguel lat [phone].    Thank you,    Abby Galdamez, PhT  [facility]

## 2023-11-13 ENCOUNTER — OFFICE VISIT (OUTPATIENT)
Dept: FAMILY MEDICINE | Facility: CLINIC | Age: 53
End: 2023-11-13
Payer: COMMERCIAL

## 2023-11-13 VITALS
HEART RATE: 117 BPM | WEIGHT: 299 LBS | OXYGEN SATURATION: 95 % | SYSTOLIC BLOOD PRESSURE: 156 MMHG | HEIGHT: 76 IN | BODY MASS INDEX: 36.41 KG/M2 | TEMPERATURE: 97 F | DIASTOLIC BLOOD PRESSURE: 92 MMHG

## 2023-11-13 DIAGNOSIS — E11.65 TYPE 2 DIABETES MELLITUS WITH HYPERGLYCEMIA, WITHOUT LONG-TERM CURRENT USE OF INSULIN (H): ICD-10-CM

## 2023-11-13 DIAGNOSIS — R29.898 LEFT LEG WEAKNESS: ICD-10-CM

## 2023-11-13 DIAGNOSIS — Z01.818 PREOP GENERAL PHYSICAL EXAM: Primary | ICD-10-CM

## 2023-11-13 DIAGNOSIS — M54.16 LUMBAR RADICULOPATHY: ICD-10-CM

## 2023-11-13 LAB
ANION GAP SERPL CALCULATED.3IONS-SCNC: 13 MMOL/L (ref 7–15)
APTT PPP: 28 SECONDS (ref 22–38)
BUN SERPL-MCNC: 18.8 MG/DL (ref 6–20)
CALCIUM SERPL-MCNC: 9.4 MG/DL (ref 8.6–10)
CHLORIDE SERPL-SCNC: 99 MMOL/L (ref 98–107)
CREAT SERPL-MCNC: 0.85 MG/DL (ref 0.67–1.17)
DEPRECATED HCO3 PLAS-SCNC: 25 MMOL/L (ref 22–29)
EGFRCR SERPLBLD CKD-EPI 2021: >90 ML/MIN/1.73M2
ERYTHROCYTE [DISTWIDTH] IN BLOOD BY AUTOMATED COUNT: 12.2 % (ref 10–15)
GLUCOSE SERPL-MCNC: 312 MG/DL (ref 70–99)
HBA1C MFR BLD: 9.1 % (ref 0–5.6)
HCT VFR BLD AUTO: 47.6 % (ref 40–53)
HGB BLD-MCNC: 16.9 G/DL (ref 13.3–17.7)
INR PPP: 0.89 (ref 0.85–1.15)
MCH RBC QN AUTO: 31 PG (ref 26.5–33)
MCHC RBC AUTO-ENTMCNC: 35.5 G/DL (ref 31.5–36.5)
MCV RBC AUTO: 87 FL (ref 78–100)
PLATELET # BLD AUTO: 284 10E3/UL (ref 150–450)
POTASSIUM SERPL-SCNC: 4.4 MMOL/L (ref 3.4–5.3)
RBC # BLD AUTO: 5.45 10E6/UL (ref 4.4–5.9)
SODIUM SERPL-SCNC: 137 MMOL/L (ref 135–145)
WBC # BLD AUTO: 6.3 10E3/UL (ref 4–11)

## 2023-11-13 PROCEDURE — 83036 HEMOGLOBIN GLYCOSYLATED A1C: CPT | Performed by: PHYSICIAN ASSISTANT

## 2023-11-13 PROCEDURE — 85027 COMPLETE CBC AUTOMATED: CPT | Performed by: PHYSICIAN ASSISTANT

## 2023-11-13 PROCEDURE — 93000 ELECTROCARDIOGRAM COMPLETE: CPT | Performed by: PHYSICIAN ASSISTANT

## 2023-11-13 PROCEDURE — 80048 BASIC METABOLIC PNL TOTAL CA: CPT | Performed by: PHYSICIAN ASSISTANT

## 2023-11-13 PROCEDURE — 99214 OFFICE O/P EST MOD 30 MIN: CPT | Mod: 25 | Performed by: PHYSICIAN ASSISTANT

## 2023-11-13 PROCEDURE — 36415 COLL VENOUS BLD VENIPUNCTURE: CPT | Performed by: PHYSICIAN ASSISTANT

## 2023-11-13 PROCEDURE — 85610 PROTHROMBIN TIME: CPT | Performed by: PHYSICIAN ASSISTANT

## 2023-11-13 PROCEDURE — 85730 THROMBOPLASTIN TIME PARTIAL: CPT | Performed by: PHYSICIAN ASSISTANT

## 2023-11-13 RX ORDER — OXYCODONE HYDROCHLORIDE 10 MG/1
10 TABLET ORAL EVERY 8 HOURS PRN
Qty: 45 TABLET | Refills: 0 | Status: SHIPPED | OUTPATIENT
Start: 2023-11-13 | End: 2023-12-12

## 2023-11-13 ASSESSMENT — PATIENT HEALTH QUESTIONNAIRE - PHQ9
SUM OF ALL RESPONSES TO PHQ QUESTIONS 1-9: 11
SUM OF ALL RESPONSES TO PHQ QUESTIONS 1-9: 11
10. IF YOU CHECKED OFF ANY PROBLEMS, HOW DIFFICULT HAVE THESE PROBLEMS MADE IT FOR YOU TO DO YOUR WORK, TAKE CARE OF THINGS AT HOME, OR GET ALONG WITH OTHER PEOPLE: VERY DIFFICULT

## 2023-11-13 NOTE — PATIENT INSTRUCTIONS
Preparing for Your Surgery  Getting started  A nurse will call you to review your health history and instructions. They will give you an arrival time based on your scheduled surgery time. Please be ready to share:  Your doctor's clinic name and phone number  Your medical, surgical, and anesthesia history  A list of allergies and sensitivities  A list of medicines, including herbal treatments and over-the-counter drugs  Whether the patient has a legal guardian (ask how to send us the papers in advance)  Please tell us if you're pregnant--or if there's any chance you might be pregnant. Some surgeries may injure a fetus (unborn baby), so they require a pregnancy test. Surgeries that are safe for a fetus don't always need a test, and you can choose whether to have one.   If you have a child who's having surgery, please ask for a copy of Preparing for Your Child's Surgery.    Preparing for surgery  Within 10 to 30 days of surgery: Have a pre-op exam (sometimes called an H&P, or History and Physical). This can be done at a clinic or pre-operative center.  If you're having a , you may not need this exam. Talk to your care team.  At your pre-op exam, talk to your care team about all medicines you take. If you need to stop any medicines before surgery, ask when to start taking them again.  We do this for your safety. Many medicines can make you bleed too much during surgery. Some change how well surgery (anesthesia) drugs work.  Call your insurance company to let them know you're having surgery. (If you don't have insurance, call [phone].)  Call your clinic if there's any change in your health. This includes signs of a cold or flu (sore throat, runny nose, cough, rash, fever). It also includes a scrape or scratch near the surgery site.  If you have questions on the day of surgery, call your hospital or surgery center.  Eating and drinking guidelines  For your safety: Unless your surgeon tells you otherwise,  follow the guidelines below.  Eat and drink as usual until 8 hours before you arrive for surgery. After that, no food or milk.  Drink clear liquids until 2 hours before you arrive. These are liquids you can see through, like water, Gatorade, and Propel Water. They also include plain black coffee and tea (no cream or milk), candy, and breath mints. You can spit out gum when you arrive.  If you drink alcohol: Stop drinking it the night before surgery.  If your care team tells you to take medicine on the morning of surgery, it's okay to take it with a sip of water.  Preventing infection  Shower or bathe the night before and morning of your surgery. Follow the instructions your clinic gave you. (If no instructions, use regular soap.)  Don't shave or clip hair near your surgery site. We'll remove the hair if needed.  Don't smoke or vape the morning of surgery. You may chew nicotine gum up to 2 hours before surgery. A nicotine patch is okay.  Note: Some surgeries require you to completely quit smoking and nicotine. Check with your surgeon.  Your care team will make every effort to keep you safe from infection. We will:  Clean our hands often with soap and water (or an alcohol-based hand rub).  Clean the skin at your surgery site with a special soap that kills germs.  Give you a special gown to keep you warm. (Cold raises the risk of infection.)  Wear special hair covers, masks, gowns and gloves during surgery.  Give antibiotic medicine, if prescribed. Not all surgeries need antibiotics.  What to bring on the day of surgery  Photo ID and insurance card  Copy of your health care directive, if you have one  Glasses and hearing aids (bring cases)  You can't wear contacts during surgery  Inhaler and eye drops, if you use them (tell us about these when you arrive)  CPAP machine or breathing device, if you use them  A few personal items, if spending the night  If you have . . .  A pacemaker, ICD (cardiac defibrillator) or other  implant: Bring the ID card.  An implanted stimulator: Bring the remote control.  A legal guardian: Bring a copy of the certified (court-stamped) guardianship papers.  Please remove any jewelry, including body piercings. Leave jewelry and other valuables at home.  If you're going home the day of surgery  You must have a responsible adult drive you home. They should stay with you overnight as well.  If you don't have someone to stay with you, and you aren't safe to go home alone, we may keep you overnight. Insurance often won't pay for this.  After surgery  If it's hard to control your pain or you need more pain medicine, please call your surgeon's office.  Questions?   If you have any questions for your care team, list them here: _________________________________________________________________________________________________________________________________________________________________________ ____________________________________ ____________________________________ ____________________________________  For informational purposes only. Not to replace the advice of your health care provider. Copyright   2003, 2019 [facility]. All rights reserved. Clinically reviewed by Tessa Curry MD. [employer] 388975 - REV 12/22.

## 2023-11-13 NOTE — PROGRESS NOTES
[facility]  [address] [facility] [address]  Phone: [phone]  Primary Provider: Susan Loaiza  Pre-op Performing Provider: SUSAN LOAIZA      PREOPERATIVE EVALUATION:  Today's date: 11/13/2023    Gagan is a 53 year old male who presents for a preoperative evaluation.      11/13/2023     1:27 PM   Additional Questions   Roomed by KATELYNN Vega       Surgical Information:  Surgery/Procedure: left lumbar 4-lumbar 5 hemilaminectomy, medial facetectomy, foraminotomies and microdiscectomy   Surgery Location: [facility]  Surgeon: Dr. Grover  Surgery Date: 11/29/2023  Time of Surgery: 3:35pm  Where patient plans to recover: At home with family  Fax number for surgical facility: Note does not need to be faxed, will be available electronically in Epic.    Assessment & Plan     The proposed surgical procedure is considered INTERMEDIATE risk.    (Z01.818) Preop general physical exam  (primary encounter diagnosis)  Comment:   Plan: EKG 12-lead complete w/read - Clinics,         Hemoglobin A1c            (R29.898) Left leg weakness  Comment:   Plan: EKG 12-lead complete w/read - Clinics,         Hemoglobin A1c, oxyCODONE IR (ROXICODONE) 10 MG        tablet            (M54.16) Lumbar radiculopathy  Comment:   Plan: EKG 12-lead complete w/read - Clinics,         Hemoglobin A1c, oxyCODONE IR (ROXICODONE) 10 MG        tablet            (E11.65) Type 2 diabetes mellitus with hyperglycemia, without long-term current use of insulin (H)  Comment: patient admittedly states diet has not been great. Also has been on prednisone for his back which has elevated his blood sugars.  Plan: Hemoglobin A1c        ADDENDUM: A1c elevated at 9.1. will increase dulaglutide from 1.5mg to 3mg            - No identified additional risk factors other than previously addressed    Antiplatelet or Anticoagulation Medication Instructions:   - Patient is on no antiplatelet or  anticoagulation medications.    Additional Medication Instructions:   - sulfonylurea (e.g. glyburide, glipizide): HOLD day of surgery    RECOMMENDATION:  APPROVAL GIVEN to proceed with proposed procedure, without further diagnostic evaluation.            Subjective       HPI related to upcoming procedure: low back pain with radiculopathy and leg weakness        11/13/2023     1:25 PM   Preop Questions   1. Have you ever had a heart attack or stroke? No   2. Have you ever had surgery on your heart or blood vessels, such as a stent placement, a coronary artery bypass, or surgery on an artery in your head, neck, heart, or legs? No   3. Do you have chest pain with activity? No   4. Do you have a history of  heart failure? No   5. Do you currently have a cold, bronchitis or symptoms of other infection? No   6. Do you have a cough, shortness of breath, or wheezing? No   7. Do you or anyone in your family have previous history of blood clots? YES - mom, not patient   8. Do you or does anyone in your family have a serious bleeding problem such as prolonged bleeding following surgeries or cuts? No   9. Have you ever had problems with anemia or been told to take iron pills? No   10. Have you had any abnormal blood loss such as black, tarry or bloody stools? No   11. Have you ever had a blood transfusion? No   12. Are you willing to have a blood transfusion if it is medically needed before, during, or after your surgery? Yes   13. Have you or any of your relatives ever had problems with anesthesia? No   14. Do you have sleep apnea, excessive snoring or daytime drowsiness? YES    14a. Do you have a CPAP machine? No   15. Do you have any artifical heart valves or other implanted medical devices like a pacemaker, defibrillator, or continuous glucose monitor? No   16. Do you have artificial joints? No   17. Are you allergic to latex? No       Health Care Directive:  Patient does not have a Health Care Directive or Living Will:  patient declined     Preoperative Review of :   reviewed - controlled substances reflected in medication list.      Status of Chronic Conditions:  See problem list for active medical problems.  Problems all longstanding and stable, except as noted/documented.  See ROS for pertinent symptoms related to these conditions.    Review of Systems  CONSTITUTIONAL: NEGATIVE for fever, chills, change in weight  INTEGUMENTARY/SKIN: NEGATIVE for worrisome rashes, moles or lesions  EYES: NEGATIVE for vision changes or irritation  ENT/MOUTH: NEGATIVE for ear, mouth and throat problems  RESP: NEGATIVE for significant cough or SOB  CV: NEGATIVE for chest pain, palpitations or peripheral edema  GI: NEGATIVE for nausea, abdominal pain, heartburn, or change in bowel habits  : NEGATIVE for frequency, dysuria, or hematuria  MUSCULOSKELETAL: NEGATIVE for significant arthralgias or myalgia  NEURO: NEGATIVE for weakness, dizziness or paresthesias  ENDOCRINE: NEGATIVE for temperature intolerance, skin/hair changes  HEME: NEGATIVE for bleeding problems  PSYCHIATRIC: NEGATIVE for changes in mood or affect    Patient Active Problem List    Diagnosis Date Noted    Diabetic polyneuropathy associated with type 2 diabetes mellitus (H) 09/11/2023     Priority: Medium    Bulging lumbar disc 05/01/2023     Priority: Medium    Right inguinal hernia 04/14/2023     Priority: Medium    Diabetes mellitus, type 2 (H) 01/05/2022     Priority: Medium    Morbid obesity (H) 01/05/2022     Priority: Medium    Mild major depression (H24) 03/05/2021     Priority: Medium    Screen for colon cancer 06/05/2020     Priority: Medium    Hypogonadism male 11/02/2016     Priority: Medium      Past Medical History:   Diagnosis Date    Arthritis 2004    Depression 2019    Diabetes mellitus (H) 2019    Prediabetes 11/2/2018     Past Surgical History:   Procedure Laterality Date    ARTHROSCOPY KNEE      bilateral     ARTHROSCOPY SHOULDER  2016    TONSILLECTOMY    "    Current Outpatient Medications   Medication Sig Dispense Refill    alcohol swab prep pads Use to swab area of injection/mark as directed. 300 each 3    blood glucose (ACCU-CHEK GUIDE) test strip Use to test blood sugar 2 times daily or as directed. 100 strip 1    blood glucose (NO BRAND SPECIFIED) test strip Use to test blood sugar 2 times daily or as directed. To accompany: Blood Glucose Monitor Brands: per insurance. 100 strip 6    blood glucose monitoring (FREESTYLE) lancets Use to test blood sugar 3 times daily. 300 each 3    blood glucose monitoring (NO BRAND SPECIFIED) meter device kit Use to test blood sugar 2 times daily or as directed. Preferred blood glucose meter OR supplies to accompany: Blood Glucose Monitor Brands: per insurance. 1 kit 0    DULoxetine (CYMBALTA) 30 MG capsule Take 1 tablet by mouth daily along with a 60 mg tablet for a total of 90 mg daily 90 capsule 3    DULoxetine (CYMBALTA) 60 MG capsule TAKE ONE CAPSULE BY MOUTH ONCE DAILY 90 capsule 1    gabapentin (NEURONTIN) 300 MG capsule Take 2 capsules (600 mg) by mouth 3 times daily 180 capsule 1    glipiZIDE (GLUCOTROL) 10 MG tablet TAKE 1 TABLET (10 MG) BY MOUTH 2 TIMES DAILY (BEFORE MEALS) 180 tablet 0    methocarbamol (ROBAXIN) 500 MG tablet Take 1 tablet (500 mg) by mouth 4 times daily as needed for muscle spasms 40 tablet 1    Needle, Disp, (BD HYPODERMIC NEEDLE) 25G X 1-1/2\" MISC USE ONE NEEDLE EVERY 14 DAYS 6 each 3    oxyCODONE (ROXICODONE) 5 MG tablet Take 1-2 tablets (5-10 mg) by mouth every 4 hours as needed for moderate to severe pain 60 tablet 0    oxyCODONE IR (ROXICODONE) 10 MG tablet Take 1 tablet (10 mg) by mouth every 8 hours as needed for severe pain 45 tablet 0    Syringe/Needle, Disp, (BD LUER-LOCK SYRINGE) 18G X 1-1/2\" 3 ML MISC 1 Syringe every 14 days Use 1 syringe every 14 days 6 each 3    Syringe/Needle, Disp, 18G X 1-1/2\" 3 ML MISC Use 1 syringe every 14 days 6 each 3    testosterone cypionate " (DEPOTESTOSTERONE) 200 MG/ML injection Inject 0.9 mLs (180 mg) into the muscle every 14 days 2 mL 5    thin (NO BRAND SPECIFIED) lancets Use with lanceting device. To accompany: Blood Glucose Monitor Brands: per insurance. 100 each 6    alcohol swab prep pads Use to swab area of injection/mark as directed. 100 each 3    blood glucose (FREESTYLE LITE) test strip Use to test blood sugar 2 times daily. 100 strip 6    blood glucose calibration (FREESTYLE CONTROL) solution Use to calibrate blood glucose monitor as directed. 1 each 3    blood glucose monitoring (FREESTYLE LITE) meter device kit Use to test blood sugar 2 times daily. 1 kit 0    blood glucose monitoring (FREESTYLE) lancets Use to test blood sugar 2 times daily. 100 each 6    Dulaglutide 3 MG/0.5ML SOPN Inject 3 mg Subcutaneous every 7 days 0.5 mL 3    naloxone (NARCAN) 4 MG/0.1ML nasal spray Spray 1 spray (4 mg) into one nostril alternating nostrils as needed for opioid reversal every 2-3 minutes until assistance arrives (Patient not taking: Reported on 11/13/2023) 0.2 mL 0       Allergies   Allergen Reactions    Bee Venom Anaphylaxis    Lorazepam         Social History     Tobacco Use    Smoking status: Former    Smokeless tobacco: Current     Types: Chew   Substance Use Topics    Alcohol use: Not on file     Family History   Problem Relation Age of Onset    Lupus Mother     Fibromyalgia Mother     Kidney Disease Mother     Clotting Disorder Mother     Depression Mother     Early Death Mother     Hypertension Father     Coronary Artery Disease Father     Diabetes Father     Arthritis Father     No Known Problems Sister     No Known Problems Brother     Diabetes Maternal Grandmother     Alcoholism Maternal Grandmother     Prostate Cancer Maternal Grandfather     Alcoholism Maternal Grandfather     Cancer Maternal Grandfather     Colon Cancer Paternal Grandmother     Arthritis Paternal Grandmother     Cancer Paternal Grandmother     Hypertension Paternal  "Grandmother     Substance Abuse Maternal Aunt      History   Drug Use Not on file         Objective     BP (!) 156/92   Pulse 117   Temp 97  F (36.1  C) (Tympanic)   Ht 1.93 m (6' 4\")   Wt 135.6 kg (299 lb)   SpO2 95%   BMI 36.40 kg/m      Physical Exam    GENERAL APPEARANCE: healthy, alert and no distress     EYES: EOMI,  PERRL     HENT: ear canals and TM's normal and nose and mouth without ulcers or lesions     NECK: no adenopathy, no asymmetry, masses, or scars and thyroid normal to palpation     RESP: lungs clear to auscultation - no rales, rhonchi or wheezes     CV: regular rates and rhythm, normal S1 S2, no S3 or S4 and no murmur, click or rub     ABDOMEN:  soft, nontender, no HSM or masses and bowel sounds normal     MS: extremities normal- no gross deformities noted, no evidence of inflammation in joints, FROM in all extremities.     SKIN: no suspicious lesions or rashes     NEURO: Normal strength and tone, sensory exam grossly normal, mentation intact and speech normal     PSYCH: mentation appears normal. and affect normal/bright     LYMPHATICS: No cervical adenopathy    Recent Labs   Lab Test 08/16/23  1310 04/09/23  0119 07/28/22  1443   HGB 17.8* 20.1* 19.7*    341 267   * 134* 136   POTASSIUM 4.2 4.2 4.8   CR 1.04 1.27* 0.98   A1C 8.1*  --  6.9*        Diagnostics:  Labs pending at this time.  Results will be reviewed when available.   EKG: appears normal, NSR, normal axis, normal intervals, no acute ST/T changes c/w ischemia, no LVH by voltage criteria, unchanged from previous tracings    Revised Cardiac Risk Index (RCRI):  The patient has the following serious cardiovascular risks for perioperative complications:   - No serious cardiac risks = 0 points     RCRI Interpretation: 0 points: Class I (very low risk - 0.4% complication rate)         Signed Electronically by: Salima Zhang PA-C  Copy of this evaluation report is provided to requesting physician.      "

## 2023-11-13 NOTE — H&P (VIEW-ONLY)
[facility]  [address] [facility] [facility] [facility] [address]  Phone: [phone]  Primary Provider: Susan Loaiza  Pre-op Performing Provider: SUSAN LOAIZA      PREOPERATIVE EVALUATION:  Today's date: 11/13/2023    Gagan is a 53 year old male who presents for a preoperative evaluation.      11/13/2023     1:27 PM   Additional Questions   Roomed by KATELYNN Vega       Surgical Information:  Surgery/Procedure: left lumbar 4-lumbar 5 hemilaminectomy, medial facetectomy, foraminotomies and microdiscectomy   Surgery Location: [facility]  Surgeon: Dr. Grover  Surgery Date: 11/29/2023  Time of Surgery: 3:35pm  Where patient plans to recover: At home with family  Fax number for surgical facility: Note does not need to be faxed, will be available electronically in Epic.    Assessment & Plan     The proposed surgical procedure is considered INTERMEDIATE risk.    (Z01.818) Preop general physical exam  (primary encounter diagnosis)  Comment:   Plan: EKG 12-lead complete w/read - Clinics,         Hemoglobin A1c            (R29.898) Left leg weakness  Comment:   Plan: EKG 12-lead complete w/read - Clinics,         Hemoglobin A1c, oxyCODONE IR (ROXICODONE) 10 MG        tablet            (M54.16) Lumbar radiculopathy  Comment:   Plan: EKG 12-lead complete w/read - Clinics,         Hemoglobin A1c, oxyCODONE IR (ROXICODONE) 10 MG        tablet            (E11.65) Type 2 diabetes mellitus with hyperglycemia, without long-term current use of insulin (H)  Comment: patient admittedly states diet has not been great. Also has been on prednisone for his back which has elevated his blood sugars.  Plan: Hemoglobin A1c        ADDENDUM: A1c elevated at 9.1. will increase dulaglutide from 1.5mg to 3mg            - No identified additional risk factors other than previously addressed    Antiplatelet or Anticoagulation Medication Instructions:   - Patient is on no antiplatelet or  anticoagulation medications.    Additional Medication Instructions:   - sulfonylurea (e.g. glyburide, glipizide): HOLD day of surgery    RECOMMENDATION:  APPROVAL GIVEN to proceed with proposed procedure, without further diagnostic evaluation.            Subjective       HPI related to upcoming procedure: low back pain with radiculopathy and leg weakness        11/13/2023     1:25 PM   Preop Questions   1. Have you ever had a heart attack or stroke? No   2. Have you ever had surgery on your heart or blood vessels, such as a stent placement, a coronary artery bypass, or surgery on an artery in your head, neck, heart, or legs? No   3. Do you have chest pain with activity? No   4. Do you have a history of  heart failure? No   5. Do you currently have a cold, bronchitis or symptoms of other infection? No   6. Do you have a cough, shortness of breath, or wheezing? No   7. Do you or anyone in your family have previous history of blood clots? YES - mom, not patient   8. Do you or does anyone in your family have a serious bleeding problem such as prolonged bleeding following surgeries or cuts? No   9. Have you ever had problems with anemia or been told to take iron pills? No   10. Have you had any abnormal blood loss such as black, tarry or bloody stools? No   11. Have you ever had a blood transfusion? No   12. Are you willing to have a blood transfusion if it is medically needed before, during, or after your surgery? Yes   13. Have you or any of your relatives ever had problems with anesthesia? No   14. Do you have sleep apnea, excessive snoring or daytime drowsiness? YES    14a. Do you have a CPAP machine? No   15. Do you have any artifical heart valves or other implanted medical devices like a pacemaker, defibrillator, or continuous glucose monitor? No   16. Do you have artificial joints? No   17. Are you allergic to latex? No       Health Care Directive:  Patient does not have a Health Care Directive or Living Will:  patient declined     Preoperative Review of :   reviewed - controlled substances reflected in medication list.      Status of Chronic Conditions:  See problem list for active medical problems.  Problems all longstanding and stable, except as noted/documented.  See ROS for pertinent symptoms related to these conditions.    Review of Systems  CONSTITUTIONAL: NEGATIVE for fever, chills, change in weight  INTEGUMENTARY/SKIN: NEGATIVE for worrisome rashes, moles or lesions  EYES: NEGATIVE for vision changes or irritation  ENT/MOUTH: NEGATIVE for ear, mouth and throat problems  RESP: NEGATIVE for significant cough or SOB  CV: NEGATIVE for chest pain, palpitations or peripheral edema  GI: NEGATIVE for nausea, abdominal pain, heartburn, or change in bowel habits  : NEGATIVE for frequency, dysuria, or hematuria  MUSCULOSKELETAL: NEGATIVE for significant arthralgias or myalgia  NEURO: NEGATIVE for weakness, dizziness or paresthesias  ENDOCRINE: NEGATIVE for temperature intolerance, skin/hair changes  HEME: NEGATIVE for bleeding problems  PSYCHIATRIC: NEGATIVE for changes in mood or affect    Patient Active Problem List    Diagnosis Date Noted    Diabetic polyneuropathy associated with type 2 diabetes mellitus (H) 09/11/2023     Priority: Medium    Bulging lumbar disc 05/01/2023     Priority: Medium    Right inguinal hernia 04/14/2023     Priority: Medium    Diabetes mellitus, type 2 (H) 01/05/2022     Priority: Medium    Morbid obesity (H) 01/05/2022     Priority: Medium    Mild major depression (H24) 03/05/2021     Priority: Medium    Screen for colon cancer 06/05/2020     Priority: Medium    Hypogonadism male 11/02/2016     Priority: Medium      Past Medical History:   Diagnosis Date    Arthritis 2004    Depression 2019    Diabetes mellitus (H) 2019    Prediabetes 11/2/2018     Past Surgical History:   Procedure Laterality Date    ARTHROSCOPY KNEE      bilateral     ARTHROSCOPY SHOULDER  2016    TONSILLECTOMY    "    Current Outpatient Medications   Medication Sig Dispense Refill    alcohol swab prep pads Use to swab area of injection/mark as directed. 300 each 3    blood glucose (ACCU-CHEK GUIDE) test strip Use to test blood sugar 2 times daily or as directed. 100 strip 1    blood glucose (NO BRAND SPECIFIED) test strip Use to test blood sugar 2 times daily or as directed. To accompany: Blood Glucose Monitor Brands: per insurance. 100 strip 6    blood glucose monitoring (FREESTYLE) lancets Use to test blood sugar 3 times daily. 300 each 3    blood glucose monitoring (NO BRAND SPECIFIED) meter device kit Use to test blood sugar 2 times daily or as directed. Preferred blood glucose meter OR supplies to accompany: Blood Glucose Monitor Brands: per insurance. 1 kit 0    DULoxetine (CYMBALTA) 30 MG capsule Take 1 tablet by mouth daily along with a 60 mg tablet for a total of 90 mg daily 90 capsule 3    DULoxetine (CYMBALTA) 60 MG capsule TAKE ONE CAPSULE BY MOUTH ONCE DAILY 90 capsule 1    gabapentin (NEURONTIN) 300 MG capsule Take 2 capsules (600 mg) by mouth 3 times daily 180 capsule 1    glipiZIDE (GLUCOTROL) 10 MG tablet TAKE 1 TABLET (10 MG) BY MOUTH 2 TIMES DAILY (BEFORE MEALS) 180 tablet 0    methocarbamol (ROBAXIN) 500 MG tablet Take 1 tablet (500 mg) by mouth 4 times daily as needed for muscle spasms 40 tablet 1    Needle, Disp, (BD HYPODERMIC NEEDLE) 25G X 1-1/2\" MISC USE ONE NEEDLE EVERY 14 DAYS 6 each 3    oxyCODONE (ROXICODONE) 5 MG tablet Take 1-2 tablets (5-10 mg) by mouth every 4 hours as needed for moderate to severe pain 60 tablet 0    oxyCODONE IR (ROXICODONE) 10 MG tablet Take 1 tablet (10 mg) by mouth every 8 hours as needed for severe pain 45 tablet 0    Syringe/Needle, Disp, (BD LUER-LOCK SYRINGE) 18G X 1-1/2\" 3 ML MISC 1 Syringe every 14 days Use 1 syringe every 14 days 6 each 3    Syringe/Needle, Disp, 18G X 1-1/2\" 3 ML MISC Use 1 syringe every 14 days 6 each 3    testosterone cypionate " (DEPOTESTOSTERONE) 200 MG/ML injection Inject 0.9 mLs (180 mg) into the muscle every 14 days 2 mL 5    thin (NO BRAND SPECIFIED) lancets Use with lanceting device. To accompany: Blood Glucose Monitor Brands: per insurance. 100 each 6    alcohol swab prep pads Use to swab area of injection/mark as directed. 100 each 3    blood glucose (FREESTYLE LITE) test strip Use to test blood sugar 2 times daily. 100 strip 6    blood glucose calibration (FREESTYLE CONTROL) solution Use to calibrate blood glucose monitor as directed. 1 each 3    blood glucose monitoring (FREESTYLE LITE) meter device kit Use to test blood sugar 2 times daily. 1 kit 0    blood glucose monitoring (FREESTYLE) lancets Use to test blood sugar 2 times daily. 100 each 6    Dulaglutide 3 MG/0.5ML SOPN Inject 3 mg Subcutaneous every 7 days 0.5 mL 3    naloxone (NARCAN) 4 MG/0.1ML nasal spray Spray 1 spray (4 mg) into one nostril alternating nostrils as needed for opioid reversal every 2-3 minutes until assistance arrives (Patient not taking: Reported on 11/13/2023) 0.2 mL 0       Allergies   Allergen Reactions    Bee Venom Anaphylaxis    Lorazepam         Social History     Tobacco Use    Smoking status: Former    Smokeless tobacco: Current     Types: Chew   Substance Use Topics    Alcohol use: Not on file     Family History   Problem Relation Age of Onset    Lupus Mother     Fibromyalgia Mother     Kidney Disease Mother     Clotting Disorder Mother     Depression Mother     Early Death Mother     Hypertension Father     Coronary Artery Disease Father     Diabetes Father     Arthritis Father     No Known Problems Sister     No Known Problems Brother     Diabetes Maternal Grandmother     Alcoholism Maternal Grandmother     Prostate Cancer Maternal Grandfather     Alcoholism Maternal Grandfather     Cancer Maternal Grandfather     Colon Cancer Paternal Grandmother     Arthritis Paternal Grandmother     Cancer Paternal Grandmother     Hypertension Paternal  "Grandmother     Substance Abuse Maternal Aunt      History   Drug Use Not on file         Objective     BP (!) 156/92   Pulse 117   Temp 97  F (36.1  C) (Tympanic)   Ht 1.93 m (6' 4\")   Wt 135.6 kg (299 lb)   SpO2 95%   BMI 36.40 kg/m      Physical Exam    GENERAL APPEARANCE: healthy, alert and no distress     EYES: EOMI,  PERRL     HENT: ear canals and TM's normal and nose and mouth without ulcers or lesions     NECK: no adenopathy, no asymmetry, masses, or scars and thyroid normal to palpation     RESP: lungs clear to auscultation - no rales, rhonchi or wheezes     CV: regular rates and rhythm, normal S1 S2, no S3 or S4 and no murmur, click or rub     ABDOMEN:  soft, nontender, no HSM or masses and bowel sounds normal     MS: extremities normal- no gross deformities noted, no evidence of inflammation in joints, FROM in all extremities.     SKIN: no suspicious lesions or rashes     NEURO: Normal strength and tone, sensory exam grossly normal, mentation intact and speech normal     PSYCH: mentation appears normal. and affect normal/bright     LYMPHATICS: No cervical adenopathy    Recent Labs   Lab Test 08/16/23  1310 04/09/23  0119 07/28/22  1443   HGB 17.8* 20.1* 19.7*    341 267   * 134* 136   POTASSIUM 4.2 4.2 4.8   CR 1.04 1.27* 0.98   A1C 8.1*  --  6.9*        Diagnostics:  Labs pending at this time.  Results will be reviewed when available.   EKG: appears normal, NSR, normal axis, normal intervals, no acute ST/T changes c/w ischemia, no LVH by voltage criteria, unchanged from previous tracings    Revised Cardiac Risk Index (RCRI):  The patient has the following serious cardiovascular risks for perioperative complications:   - No serious cardiac risks = 0 points     RCRI Interpretation: 0 points: Class I (very low risk - 0.4% complication rate)         Signed Electronically by: Salima Zhang PA-C  Copy of this evaluation report is provided to requesting physician.      "

## 2023-11-14 ENCOUNTER — MYC MEDICAL ADVICE (OUTPATIENT)
Dept: FAMILY MEDICINE | Facility: CLINIC | Age: 53
End: 2023-11-14
Payer: COMMERCIAL

## 2023-11-14 ENCOUNTER — TELEPHONE (OUTPATIENT)
Dept: FAMILY MEDICINE | Facility: CLINIC | Age: 53
End: 2023-11-14
Payer: COMMERCIAL

## 2023-11-14 DIAGNOSIS — E11.65 TYPE 2 DIABETES MELLITUS WITH HYPERGLYCEMIA, WITHOUT LONG-TERM CURRENT USE OF INSULIN (H): Primary | ICD-10-CM

## 2023-11-14 NOTE — TELEPHONE ENCOUNTER
Accu-Chek Guide is not covered by insurance. Can we get a Meter, Lancets and Test Strips for the Freestyle instead? That is preferred by insurance. Thank you.

## 2023-11-15 RX ORDER — BLOOD-GLUCOSE METER
KIT MISCELLANEOUS
Qty: 100 STRIP | Refills: 6 | Status: SHIPPED | OUTPATIENT
Start: 2023-11-15 | End: 2024-01-16

## 2023-11-15 RX ORDER — LANCETS 28 GAUGE
EACH MISCELLANEOUS
Qty: 100 EACH | Refills: 6 | Status: SHIPPED | OUTPATIENT
Start: 2023-11-15 | End: 2024-01-16

## 2023-11-15 RX ORDER — GLUCOSAMINE HCL/CHONDROITIN SU 500-400 MG
CAPSULE ORAL
Qty: 100 EACH | Refills: 3 | Status: SHIPPED | OUTPATIENT
Start: 2023-11-15 | End: 2023-11-28

## 2023-11-15 RX ORDER — BLOOD-GLUCOSE METER
KIT MISCELLANEOUS
Qty: 1 KIT | Refills: 0 | Status: SHIPPED | OUTPATIENT
Start: 2023-11-15 | End: 2024-01-16

## 2023-11-15 RX ORDER — BLOOD-GLUCOSE CONTROL, NORMAL
EACH MISCELLANEOUS
Qty: 1 EACH | Refills: 3 | Status: SHIPPED | OUTPATIENT
Start: 2023-11-15 | End: 2024-01-16

## 2023-11-25 ENCOUNTER — TELEPHONE (OUTPATIENT)
Dept: FAMILY MEDICINE | Facility: CLINIC | Age: 53
End: 2023-11-25
Payer: COMMERCIAL

## 2023-11-25 NOTE — TELEPHONE ENCOUNTER
.Prior Authorization Retail Medication Request    Medication/Dose: Testosterone Cyp 200mg requires a PA  Diagnosis and ICD code (if different than what is on RX):  N/A  New/renewal/insurance change PA/secondary ins. PA:  Previously Tried and Failed:  N/A  Rationale:  N/A    Insurance   Primary: [employer]  Insurance ID:  [id number]    Secondary (if applicable):N/A  Insurance ID:  N/A    Pharmacy Information (if different than what is on RX)  Name:  [facility] [place]  Phone:  [phone]  Fax:[phone]

## 2023-11-26 ENCOUNTER — MYC MEDICAL ADVICE (OUTPATIENT)
Dept: NEUROSURGERY | Facility: CLINIC | Age: 53
End: 2023-11-26
Payer: COMMERCIAL

## 2023-11-27 NOTE — TELEPHONE ENCOUNTER
Faxed  workability form  November 27, 2023 to [facility] Absence Management Team at fax number [phone]    Right Fax confirmed at 8:37 AM    Sekou Avila RN

## 2023-11-28 ENCOUNTER — TELEPHONE (OUTPATIENT)
Dept: NEUROSURGERY | Facility: CLINIC | Age: 53
End: 2023-11-28
Payer: COMMERCIAL

## 2023-11-28 NOTE — TELEPHONE ENCOUNTER
Called patient, discussed surgery, post-op course, expectations, follow up plan.    Reviewed H&P from 11/13/2023 - cleared for surgery   Labs, EKG - WNL    MRI done on 09/27/2023 - in Nil    To OR as planned.     Check in - 0930    Nothing to eat or drink after midnight the night before surgery.     Reviewed with patient: Arrive 2 hours prior to scheduled surgery.    Continue to refrain from NSAIDS (Ibuprofen, Aleve, Naprosyn), ASA, Over the counter herbal medications or supplements, anti-coagulants and blood thinners.     Patient confirmed they have help/assistance in place at home upon discharge    Instructions: using a washcloth and a bottle of provided Hibiclens, wash your body, avoiding your face and genitals. Preferably, shower the night before surgery and the morning of surgery using a half a bottle each time for your whole body shower.        Patient was informed that we will provide up to 6 weeks prescription of pain medication for post-operative pain.      Instructed patient about the following: After your surgery, if you will be staying in-patient, a nursing provider team will be monitoring you closely throughout your stay and communicate your health status to your surgeon and other providers.  You will be seen by Advanced Practice Providers (e.g., nurse practitioners, clinic nurse specialist, and physician assistants) who will check on you regularly to assess the status of your surgery.     Zakiya Paris RN, CNRN

## 2023-11-29 ENCOUNTER — HOSPITAL ENCOUNTER (OUTPATIENT)
Facility: HOSPITAL | Age: 53
Discharge: HOME | End: 2023-11-29
Attending: SURGERY | Admitting: SURGERY
Payer: COMMERCIAL

## 2023-11-29 ENCOUNTER — APPOINTMENT (OUTPATIENT)
Dept: RADIOLOGY | Facility: HOSPITAL | Age: 53
End: 2023-11-29
Attending: PHYSICIAN ASSISTANT
Payer: COMMERCIAL

## 2023-11-29 ENCOUNTER — ANESTHESIA (OUTPATIENT)
Dept: SURGERY | Facility: HOSPITAL | Age: 53
End: 2023-11-29
Payer: COMMERCIAL

## 2023-11-29 ENCOUNTER — ANESTHESIA EVENT (OUTPATIENT)
Dept: SURGERY | Facility: HOSPITAL | Age: 53
End: 2023-11-29
Payer: COMMERCIAL

## 2023-11-29 VITALS
HEART RATE: 109 BPM | TEMPERATURE: 97.2 F | BODY MASS INDEX: 35.02 KG/M2 | RESPIRATION RATE: 20 BRPM | WEIGHT: 287.7 LBS | OXYGEN SATURATION: 94 % | SYSTOLIC BLOOD PRESSURE: 131 MMHG | DIASTOLIC BLOOD PRESSURE: 90 MMHG

## 2023-11-29 DIAGNOSIS — M51.16 LUMBAR DISC HERNIATION WITH RADICULOPATHY: Primary | ICD-10-CM

## 2023-11-29 LAB
GLUCOSE BLDC GLUCOMTR-MCNC: 228 MG/DL (ref 70–99)
GLUCOSE BLDC GLUCOMTR-MCNC: 236 MG/DL (ref 70–99)
GLUCOSE BLDC GLUCOMTR-MCNC: 263 MG/DL (ref 70–99)

## 2023-11-29 PROCEDURE — 272N000001 HC OR GENERAL SUPPLY STERILE: Performed by: SURGERY

## 2023-11-29 PROCEDURE — 258N000003 HC RX IP 258 OP 636: Performed by: STUDENT IN AN ORGANIZED HEALTH CARE EDUCATION/TRAINING PROGRAM

## 2023-11-29 PROCEDURE — 999N000141 HC STATISTIC PRE-PROCEDURE NURSING ASSESSMENT: Performed by: SURGERY

## 2023-11-29 PROCEDURE — 82962 GLUCOSE BLOOD TEST: CPT

## 2023-11-29 PROCEDURE — 250N000011 HC RX IP 250 OP 636: Performed by: PHYSICIAN ASSISTANT

## 2023-11-29 PROCEDURE — 710N000009 HC RECOVERY PHASE 1, LEVEL 1, PER MIN: Performed by: SURGERY

## 2023-11-29 PROCEDURE — 250N000009 HC RX 250: Performed by: STUDENT IN AN ORGANIZED HEALTH CARE EDUCATION/TRAINING PROGRAM

## 2023-11-29 PROCEDURE — 250N000009 HC RX 250: Performed by: SURGERY

## 2023-11-29 PROCEDURE — 710N000012 HC RECOVERY PHASE 2, PER MINUTE: Performed by: SURGERY

## 2023-11-29 PROCEDURE — 370N000017 HC ANESTHESIA TECHNICAL FEE, PER MIN: Performed by: SURGERY

## 2023-11-29 PROCEDURE — 999N000063 XR CROSSTABLE LATERAL LUMBAR SPINE PORTABLE

## 2023-11-29 PROCEDURE — 250N000011 HC RX IP 250 OP 636: Performed by: STUDENT IN AN ORGANIZED HEALTH CARE EDUCATION/TRAINING PROGRAM

## 2023-11-29 PROCEDURE — 250N000013 HC RX MED GY IP 250 OP 250 PS 637: Performed by: PHYSICIAN ASSISTANT

## 2023-11-29 PROCEDURE — 250N000025 HC SEVOFLURANE, PER MIN: Performed by: SURGERY

## 2023-11-29 PROCEDURE — 360N000077 HC SURGERY LEVEL 4, PER MIN: Performed by: SURGERY

## 2023-11-29 PROCEDURE — 250N000009 HC RX 250: Performed by: PHYSICIAN ASSISTANT

## 2023-11-29 RX ORDER — ONDANSETRON 4 MG/1
4 TABLET, ORALLY DISINTEGRATING ORAL EVERY 30 MIN PRN
Status: DISCONTINUED | OUTPATIENT
Start: 2023-11-29 | End: 2023-11-29 | Stop reason: HOSPADM

## 2023-11-29 RX ORDER — LIDOCAINE HYDROCHLORIDE 10 MG/ML
INJECTION, SOLUTION INFILTRATION; PERINEURAL PRN
Status: DISCONTINUED | OUTPATIENT
Start: 2023-11-29 | End: 2023-11-29

## 2023-11-29 RX ORDER — HYDROMORPHONE HCL IN WATER/PF 6 MG/30 ML
0.2 PATIENT CONTROLLED ANALGESIA SYRINGE INTRAVENOUS
Status: DISCONTINUED | OUTPATIENT
Start: 2023-11-29 | End: 2023-11-29 | Stop reason: HOSPADM

## 2023-11-29 RX ORDER — HYDROMORPHONE HCL IN WATER/PF 6 MG/30 ML
0.2 PATIENT CONTROLLED ANALGESIA SYRINGE INTRAVENOUS EVERY 5 MIN PRN
Status: DISCONTINUED | OUTPATIENT
Start: 2023-11-29 | End: 2023-11-29 | Stop reason: HOSPADM

## 2023-11-29 RX ORDER — OXYCODONE HYDROCHLORIDE 5 MG/1
10 TABLET ORAL EVERY 4 HOURS PRN
Status: DISCONTINUED | OUTPATIENT
Start: 2023-11-29 | End: 2023-11-29 | Stop reason: HOSPADM

## 2023-11-29 RX ORDER — ACETAMINOPHEN 500 MG
1000 TABLET ORAL EVERY MORNING
COMMUNITY
End: 2024-01-16

## 2023-11-29 RX ORDER — NALOXONE HYDROCHLORIDE 0.4 MG/ML
0.4 INJECTION, SOLUTION INTRAMUSCULAR; INTRAVENOUS; SUBCUTANEOUS
Status: DISCONTINUED | OUTPATIENT
Start: 2023-11-29 | End: 2023-11-29 | Stop reason: HOSPADM

## 2023-11-29 RX ORDER — GABAPENTIN 300 MG/1
300 CAPSULE ORAL
Status: COMPLETED | OUTPATIENT
Start: 2023-11-29 | End: 2023-11-29

## 2023-11-29 RX ORDER — FENTANYL CITRATE 50 UG/ML
25 INJECTION, SOLUTION INTRAMUSCULAR; INTRAVENOUS EVERY 5 MIN PRN
Status: DISCONTINUED | OUTPATIENT
Start: 2023-11-29 | End: 2023-11-29 | Stop reason: HOSPADM

## 2023-11-29 RX ORDER — OXYCODONE HYDROCHLORIDE 5 MG/1
5 TABLET ORAL EVERY 4 HOURS PRN
Qty: 42 TABLET | Refills: 0 | Status: SHIPPED | OUTPATIENT
Start: 2023-11-29 | End: 2024-01-16

## 2023-11-29 RX ORDER — ONDANSETRON 2 MG/ML
4 INJECTION INTRAMUSCULAR; INTRAVENOUS EVERY 30 MIN PRN
Status: DISCONTINUED | OUTPATIENT
Start: 2023-11-29 | End: 2023-11-29 | Stop reason: HOSPADM

## 2023-11-29 RX ORDER — NALOXONE HYDROCHLORIDE 0.4 MG/ML
0.2 INJECTION, SOLUTION INTRAMUSCULAR; INTRAVENOUS; SUBCUTANEOUS
Status: DISCONTINUED | OUTPATIENT
Start: 2023-11-29 | End: 2023-11-29 | Stop reason: HOSPADM

## 2023-11-29 RX ORDER — HYDROMORPHONE HCL IN WATER/PF 6 MG/30 ML
0.4 PATIENT CONTROLLED ANALGESIA SYRINGE INTRAVENOUS
Status: DISCONTINUED | OUTPATIENT
Start: 2023-11-29 | End: 2023-11-29 | Stop reason: HOSPADM

## 2023-11-29 RX ORDER — IBUPROFEN 200 MG
800 TABLET ORAL
Status: ON HOLD | COMMUNITY
End: 2023-11-29

## 2023-11-29 RX ORDER — LIDOCAINE HYDROCHLORIDE AND EPINEPHRINE 10; 10 MG/ML; UG/ML
INJECTION, SOLUTION INFILTRATION; PERINEURAL PRN
Status: DISCONTINUED | OUTPATIENT
Start: 2023-11-29 | End: 2023-11-29 | Stop reason: HOSPADM

## 2023-11-29 RX ORDER — ONDANSETRON 4 MG/1
4 TABLET, ORALLY DISINTEGRATING ORAL EVERY 6 HOURS PRN
Status: DISCONTINUED | OUTPATIENT
Start: 2023-11-29 | End: 2023-11-29 | Stop reason: HOSPADM

## 2023-11-29 RX ORDER — OXYCODONE HYDROCHLORIDE 5 MG/1
5 TABLET ORAL
Status: DISCONTINUED | OUTPATIENT
Start: 2023-11-29 | End: 2023-11-29 | Stop reason: HOSPADM

## 2023-11-29 RX ORDER — DEXAMETHASONE SODIUM PHOSPHATE 10 MG/ML
INJECTION, SOLUTION INTRAMUSCULAR; INTRAVENOUS PRN
Status: DISCONTINUED | OUTPATIENT
Start: 2023-11-29 | End: 2023-11-29

## 2023-11-29 RX ORDER — OXYCODONE HYDROCHLORIDE 5 MG/1
5 TABLET ORAL EVERY 4 HOURS PRN
Qty: 42 TABLET | Refills: 0 | Status: SHIPPED | OUTPATIENT
Start: 2023-11-29 | End: 2023-11-29

## 2023-11-29 RX ORDER — SODIUM CHLORIDE, SODIUM LACTATE, POTASSIUM CHLORIDE, CALCIUM CHLORIDE 600; 310; 30; 20 MG/100ML; MG/100ML; MG/100ML; MG/100ML
INJECTION, SOLUTION INTRAVENOUS CONTINUOUS
Status: DISCONTINUED | OUTPATIENT
Start: 2023-11-29 | End: 2023-11-29 | Stop reason: HOSPADM

## 2023-11-29 RX ORDER — LIDOCAINE 40 MG/G
CREAM TOPICAL
Status: DISCONTINUED | OUTPATIENT
Start: 2023-11-29 | End: 2023-11-29 | Stop reason: HOSPADM

## 2023-11-29 RX ORDER — HYDROMORPHONE HCL IN WATER/PF 6 MG/30 ML
0.4 PATIENT CONTROLLED ANALGESIA SYRINGE INTRAVENOUS EVERY 5 MIN PRN
Status: DISCONTINUED | OUTPATIENT
Start: 2023-11-29 | End: 2023-11-29 | Stop reason: HOSPADM

## 2023-11-29 RX ORDER — PROPOFOL 10 MG/ML
INJECTION, EMULSION INTRAVENOUS PRN
Status: DISCONTINUED | OUTPATIENT
Start: 2023-11-29 | End: 2023-11-29

## 2023-11-29 RX ORDER — PROCHLORPERAZINE MALEATE 10 MG
10 TABLET ORAL EVERY 6 HOURS PRN
Status: DISCONTINUED | OUTPATIENT
Start: 2023-11-29 | End: 2023-11-29 | Stop reason: HOSPADM

## 2023-11-29 RX ORDER — FENTANYL CITRATE 50 UG/ML
INJECTION, SOLUTION INTRAMUSCULAR; INTRAVENOUS PRN
Status: DISCONTINUED | OUTPATIENT
Start: 2023-11-29 | End: 2023-11-29

## 2023-11-29 RX ORDER — ACETAMINOPHEN 325 MG/1
975 TABLET ORAL EVERY 8 HOURS
Status: DISCONTINUED | OUTPATIENT
Start: 2023-11-29 | End: 2023-11-29 | Stop reason: HOSPADM

## 2023-11-29 RX ORDER — CEFAZOLIN SODIUM/WATER 3 G/30 ML
3 SYRINGE (ML) INTRAVENOUS SEE ADMIN INSTRUCTIONS
Status: DISCONTINUED | OUTPATIENT
Start: 2023-11-29 | End: 2023-11-29 | Stop reason: HOSPADM

## 2023-11-29 RX ORDER — ACETAMINOPHEN 325 MG/1
650 TABLET ORAL EVERY 4 HOURS PRN
Status: DISCONTINUED | OUTPATIENT
Start: 2023-12-02 | End: 2023-11-29 | Stop reason: HOSPADM

## 2023-11-29 RX ORDER — METHOCARBAMOL 500 MG/1
500 TABLET, FILM COATED ORAL EVERY 6 HOURS PRN
Qty: 42 TABLET | Refills: 0 | Status: SHIPPED | OUTPATIENT
Start: 2023-11-29 | End: 2024-01-16

## 2023-11-29 RX ORDER — ONDANSETRON 2 MG/ML
INJECTION INTRAMUSCULAR; INTRAVENOUS PRN
Status: DISCONTINUED | OUTPATIENT
Start: 2023-11-29 | End: 2023-11-29

## 2023-11-29 RX ORDER — OXYCODONE HYDROCHLORIDE 5 MG/1
5 TABLET ORAL EVERY 4 HOURS PRN
Status: DISCONTINUED | OUTPATIENT
Start: 2023-11-29 | End: 2023-11-29 | Stop reason: HOSPADM

## 2023-11-29 RX ORDER — ONDANSETRON 2 MG/ML
4 INJECTION INTRAMUSCULAR; INTRAVENOUS EVERY 6 HOURS PRN
Status: DISCONTINUED | OUTPATIENT
Start: 2023-11-29 | End: 2023-11-29 | Stop reason: HOSPADM

## 2023-11-29 RX ORDER — FENTANYL CITRATE 50 UG/ML
50 INJECTION, SOLUTION INTRAMUSCULAR; INTRAVENOUS EVERY 5 MIN PRN
Status: DISCONTINUED | OUTPATIENT
Start: 2023-11-29 | End: 2023-11-29 | Stop reason: HOSPADM

## 2023-11-29 RX ORDER — CEFAZOLIN SODIUM/WATER 3 G/30 ML
3 SYRINGE (ML) INTRAVENOUS
Status: COMPLETED | OUTPATIENT
Start: 2023-11-29 | End: 2023-11-29

## 2023-11-29 RX ORDER — OXYCODONE HYDROCHLORIDE 5 MG/1
10 TABLET ORAL
Status: DISCONTINUED | OUTPATIENT
Start: 2023-11-29 | End: 2023-11-29 | Stop reason: HOSPADM

## 2023-11-29 RX ADMIN — OXYCODONE HYDROCHLORIDE 5 MG: 5 TABLET ORAL at 14:15

## 2023-11-29 RX ADMIN — SUGAMMADEX 300 MG: 100 INJECTION, SOLUTION INTRAVENOUS at 13:10

## 2023-11-29 RX ADMIN — FENTANYL CITRATE 50 MCG: 50 INJECTION INTRAMUSCULAR; INTRAVENOUS at 11:36

## 2023-11-29 RX ADMIN — LIDOCAINE HYDROCHLORIDE 50 MG: 10 INJECTION, SOLUTION INFILTRATION; PERINEURAL at 11:23

## 2023-11-29 RX ADMIN — HYDROMORPHONE HYDROCHLORIDE 0.5 MG: 1 INJECTION, SOLUTION INTRAMUSCULAR; INTRAVENOUS; SUBCUTANEOUS at 11:57

## 2023-11-29 RX ADMIN — SODIUM CHLORIDE, POTASSIUM CHLORIDE, SODIUM LACTATE AND CALCIUM CHLORIDE: 600; 310; 30; 20 INJECTION, SOLUTION INTRAVENOUS at 10:30

## 2023-11-29 RX ADMIN — HYDROMORPHONE HYDROCHLORIDE 0.5 MG: 1 INJECTION, SOLUTION INTRAMUSCULAR; INTRAVENOUS; SUBCUTANEOUS at 12:19

## 2023-11-29 RX ADMIN — ONDANSETRON 4 MG: 2 INJECTION INTRAMUSCULAR; INTRAVENOUS at 12:35

## 2023-11-29 RX ADMIN — Medication 3 G: at 11:07

## 2023-11-29 RX ADMIN — FENTANYL CITRATE 50 MCG: 50 INJECTION INTRAMUSCULAR; INTRAVENOUS at 11:23

## 2023-11-29 RX ADMIN — ROCURONIUM BROMIDE 60 MG: 50 INJECTION, SOLUTION INTRAVENOUS at 11:23

## 2023-11-29 RX ADMIN — ROCURONIUM BROMIDE 20 MG: 50 INJECTION, SOLUTION INTRAVENOUS at 12:30

## 2023-11-29 RX ADMIN — SODIUM CHLORIDE, POTASSIUM CHLORIDE, SODIUM LACTATE AND CALCIUM CHLORIDE: 600; 310; 30; 20 INJECTION, SOLUTION INTRAVENOUS at 13:08

## 2023-11-29 RX ADMIN — PROPOFOL 200 MG: 10 INJECTION, EMULSION INTRAVENOUS at 11:23

## 2023-11-29 RX ADMIN — DEXAMETHASONE SODIUM PHOSPHATE 4 MG: 10 INJECTION, SOLUTION INTRAMUSCULAR; INTRAVENOUS at 11:45

## 2023-11-29 RX ADMIN — ROCURONIUM BROMIDE 40 MG: 50 INJECTION, SOLUTION INTRAVENOUS at 11:47

## 2023-11-29 ASSESSMENT — ACTIVITIES OF DAILY LIVING (ADL)
ADLS_ACUITY_SCORE: 35

## 2023-11-29 NOTE — OP NOTE
NEUROSURGERY OPERATIVE REPORT     DATE OF SERVICE: 11/29/2023    PREOPERATIVE DIAGNOSES:  Lumbar disc herniation  Lumbar radiculopathy      POSTOPERATIVE DIAGNOSES:  Same     PROCEDURES:  1.  Left lumbar 4-lumbar 5 hemilaminectomy, medial facetectomy, foraminotomies and microdiscectomy   2.  Use of operating room microscope.    SURGEON:  Evelin Grover MD    ASSISTANT: Linda Gabriel PA-C     INDICATIONS:  Morris Christopher is a 54 yo male who presents with low back and left leg pain as well as numbness and tingling and weakness. Lumbar xray shows sacralization of lumbar 5 with normal alignment and no instability. MRI of his lumbar spine shows a left paracentral disc herniation at lumbar 4-5 resulting in L5 nerve impingement which progressed since prior MRI. Due to severity of symptoms including weakness and sensroy loss do not recommend further conservative management. Recommend left lumbar 4-lumbar 5 hemilaminectomy, medial facetectomy, foraminotomies and microdiscectomy. Risks and benefits of lumbar decompression with microdiscectomy discussed including but not limited to infection, hematoma, nerve damage including paralysis, post op radiculitis, durotomy, recurrent disc herniation,  risks associated with the use of general anesthesia, blood clots in the lungs or legs. We also discussed given he has history of more chronic back pain he may likely have residual chronic back pain following the procedure. He appeared to understand and agreed to proceed.      PROCEDURE:  After obtaining informed consent, the patient was brought to the operating room with pneumatic stockings in place.  IV antibiotics was administered.  He was intubated under general endotracheal anesthesia.  He was turned into the radiolucent laminectomy frame with all pressure points well padded.   He was prepped and draped in the usual sterile fashion.  A preincisional infiltration of local anesthetic was performed along the midline and the  incision was opened sharply and extended down to the fascia.  The fascia was opened in one layer with monopolar electrocautery.  A subperiosteal dissection was undertaken to expose the lamina at left lumbar 4-5. We verified levels with a lateral x-ray.      Once levels were verified, we then proceeded to remove the inferior portion of the lamina of left lumbar 4 and the  superior portion of the lamina of lumbar 5 with a Midas drill.   We drilled down to the ligamentum elevated the ligamentum from the underlying thecal sac and removed it with a combination of Kerrisons and curettes.  We brought in the operating room microscope for this and all microdissection.  We next drilled a partial medial facetectomy and foraminotomies opening up the lateral recess and expose the underlying impinged nerve root.  We exposed the annulus of the disc space identified the nerve and cut the annulus as needed to evacuate the disc bulge to release impingement on the nerve.  We used a combination of pituitaries and curettes to remove sufficient disc and then used blunt hooks under the thecal sac and in the foramen to verify that there was no further disc material to milk out into the dissection site.  Once the nerve appeared decompressed we used a  ball tip probe to verify no further pressure either in the canal or in the foramina.  The lateral recess was nicely decompressed.       An assistant was needed for positioning, retraction and closure.    We then proceeded to copiously irrigate the incision with antibiotic-containing saline and achieved hemostasis.   The incision was closed in layers with interrupted 0 Vicryl to approximate the muscle and fascia, inverted 2-0 Vicryl to approximate the subcutaneous tissues and Monocryl to approximate the skin edges.  Dermabond was then placed. Sponge and needle counts were correct prior to closure x2.  Sterile dressings were placed.      Patient tolerated the procedure well, was taken to the  recovery room where he was extubated and found to be at his neurological baseline with no new deficits appreciated.    ESTIMATED BLOOD LOSS: 20 ml    SPECIMENS: none    FINDINGS: large disc herniation    Evelin Grover MD    Cc: Salima Zhang

## 2023-11-29 NOTE — TELEPHONE ENCOUNTER
Prior Authorization Not Needed per Insurance    Medication: TESTOSTERONE CYPIONATE 200 MG/ML IM SOLN  Insurance Company: [employer] - Phone [phone] Fax [phone]  Expected CoPay: $    Pharmacy Filling the Rx: [facility] [address]  Pharmacy Notified: y  Patient Notified: y    Pharmacy confirmed pain claim.

## 2023-11-29 NOTE — BRIEF OP NOTE
[facility] Brief Operative Note    Pre-operative diagnosis: Lumbar radiculopathy [M54.16]   Post-operative diagnosis same   Procedure: Procedure(s):  left lumbar 4-lumbar 5 hemilaminectomy, medial facetectomy, foraminotomies and microdiscectomy   Surgeon(s): Surgeon(s) and Role:     * Evelin Grover MD - Primary     * Linda Gabriel PA-C   Estimated blood loss: 20 mL    Specimens: * No specimens in log *   Findings: Large disc herniation

## 2023-11-29 NOTE — ANESTHESIA PROCEDURE NOTES
Airway       Patient location during procedure: OR       Procedure Start/Stop Times: 11/29/2023 11:26 AM  Staff -        Anesthesiologist:  King Mora DO       CRNA: Mary Jo Guerrero APRN CRNA       Performed By: CRNA  Consent for Airway        Urgency: elective  Indications and Patient Condition       Indications for airway management: ez-procedural       Induction type:intravenous       Mask difficulty assessment: 3 - difficult mask (inadequate, unstable, or two providers) +/- NMBA    Final Airway Details       Final airway type: endotracheal airway       Successful airway: ETT - single  Endotracheal Airway Details        ETT size (mm): 8.0       Cuffed: yes       Successful intubation technique: direct laryngoscopy       DL Blade Type: MAC 4       Grade View of Cords: 3       Adjucts: stylet       Position: Right       Measured from: gums/teeth       Secured at (cm): 24    Post intubation assessment        Placement verified by: capnometry, equal breath sounds and chest rise        Number of attempts at approach: 1       Number of other approaches attempted: 0       Secured with: tape       Ease of procedure: easy       Dentition: Intact and Unchanged (poor dentition noted pre-laryngoscopy. Broken and chipped teeth with sharp edges.)       Dental guard used and removed. Dental Guard Type: Standard White.    Medication(s) Administered   Medication Administration Time: 11/29/2023 11:26 AM

## 2023-11-29 NOTE — PHARMACY-ADMISSION MEDICATION HISTORY
Pharmacist Admission Medication History    Admission medication history is complete. The information provided in this note is only as accurate as the sources available at the time of the update.    Information Source(s): Patient, Clinic records, and CareEverywhere/SureScripts via in-person    Pertinent Information: Trulicity dose recently increased. New dose to begin this Friday. Patient stopped taking oxycodone a few days ago because it wasn't really helping. He started using APAP and ibuprofen since stopping oxycodone.        Allergies reviewed with patient and updates made in EHR: yes    Medication History Completed By: Gilbert Daniel [facility] 11/29/2023 10:35 AM    PTA Med List   Medication Sig Last Dose    acetaminophen (TYLENOL) 500 MG tablet Take 1,000 mg by mouth every morning 11/29/2023    diphenhydrAMINE-acetaminophen (TYLENOL PM)  MG tablet Take 2 tablets by mouth at bedtime 11/28/2023    Dulaglutide 3 MG/0.5ML SOPN Inject 3 mg Subcutaneous every 7 days 11/24/2023    DULoxetine (CYMBALTA) 30 MG capsule Take 1 tablet by mouth daily along with a 60 mg tablet for a total of 90 mg daily 11/29/2023    DULoxetine (CYMBALTA) 60 MG capsule TAKE ONE CAPSULE BY MOUTH ONCE DAILY 11/29/2023    gabapentin (NEURONTIN) 300 MG capsule Take 2 capsules (600 mg) by mouth 3 times daily 11/29/2023 at 0800    glipiZIDE (GLUCOTROL) 10 MG tablet TAKE 1 TABLET (10 MG) BY MOUTH 2 TIMES DAILY (BEFORE MEALS) 11/28/2023 at pm    ibuprofen (ADVIL/MOTRIN) 200 MG tablet Take 800 mg by mouth daily (with lunch) 11/28/2023    methocarbamol (ROBAXIN) 500 MG tablet Take 1 tablet (500 mg) by mouth 4 times daily as needed for muscle spasms Past Week    oxyCODONE IR (ROXICODONE) 10 MG tablet Take 1 tablet (10 mg) by mouth every 8 hours as needed for severe pain Past Week    testosterone cypionate (DEPOTESTOSTERONE) 200 MG/ML injection Inject 0.9 mLs (180 mg) into the muscle every 14 days 11/24/2023

## 2023-11-29 NOTE — ANESTHESIA POSTPROCEDURE EVALUATION
Patient: Morris Christopher    Procedure: Procedure(s):  left lumbar 4-lumbar 5 hemilaminectomy, medial facetectomy, foraminotomies and microdiscectomy       Anesthesia Type:  General    Note:  Disposition: Outpatient   Postop Pain Control: Uneventful            Sign Out: Well controlled pain   PONV: No   Neuro/Psych: Uneventful            Sign Out: Acceptable/Baseline neuro status   Airway/Respiratory: Uneventful            Sign Out: Acceptable/Baseline resp. status   CV/Hemodynamics: Uneventful            Sign Out: Acceptable CV status; No obvious hypovolemia; No obvious fluid overload   Other NRE: NONE   DID A NON-ROUTINE EVENT OCCUR? No    Event details/Postop Comments:  Patient recovering comfortably.        Last vitals:  Vitals Value Taken Time   /90 11/29/23 1415   Temp 36.2  C (97.2  F) 11/29/23 1325   Pulse 106 11/29/23 1417   Resp 13 11/29/23 1417   SpO2 93 % 11/29/23 1417   Vitals shown include unfiled device data.    Electronically Signed By: King Mora DO  November 29, 2023  3:52 PM

## 2023-11-29 NOTE — INTERVAL H&P NOTE
"I have reviewed the surgical (or preoperative) H&P that is linked to this encounter, and examined the patient. There are no significant changes    Clinical Conditions Present on Arrival:  Clinically Significant Risk Factors Present on Admission                  # DMII: A1C = 9.1 % (Ref range: 0.0 - 5.6 %) within past 6 months  # Obesity: Estimated body mass index is 35.02 kg/m  as calculated from the following:    Height as of 11/13/23: 6' 4\" (1.93 m).    Weight as of this encounter: 287 lb 11.2 oz (130.5 kg).       "

## 2023-11-29 NOTE — ANESTHESIA PREPROCEDURE EVALUATION
Anesthesia Pre-Procedure Evaluation    Patient: Morris Christopher   MRN: [id number] : 1970        Procedure : Procedure(s):  left lumbar 4-lumbar 5 hemilaminectomy, medial facetectomy, foraminotomies and microdiscectomy          Past Medical History:   Diagnosis Date     Arthritis 2004     Bulging lumbar disc      Depression 2019     Diabetes mellitus (H) 2019     Hypogonadism male      Inguinal hernia, right      Morbid obesity (H)      Prediabetes 2018     Type 2 diabetes mellitus with diabetic polyneuropathy (H)       Past Surgical History:   Procedure Laterality Date     ARTHROSCOPY KNEE      bilateral      ARTHROSCOPY SHOULDER  2016     TONSILLECTOMY        Allergies   Allergen Reactions     Bee Venom Anaphylaxis     Lorazepam Other (See Comments)     Decreased respirations      Social History     Tobacco Use     Smoking status: Former     Smokeless tobacco: Current     Types: Chew   Substance Use Topics     Alcohol use: Not Currently      Wt Readings from Last 1 Encounters:   23 130.5 kg (287 lb 11.2 oz)        Anesthesia Evaluation            ROS/MED HX  ENT/Pulmonary:       Neurologic:       Cardiovascular:     (+)  hypertension- -   -  - -                                      METS/Exercise Tolerance:     Hematologic:       Musculoskeletal:       GI/Hepatic:     (+) GERD,                   Renal/Genitourinary:       Endo:     (+)  type II DM,             Obesity,       Psychiatric/Substance Use:     (+) psychiatric history depression       Infectious Disease:       Malignancy:       Other:            Physical Exam    Airway        Mallampati: III   TM distance: > 3 FB   Neck ROM: full   Mouth opening: > 3 cm    Respiratory Devices and Support         Dental       (+) Multiple visibly decayed, broken teeth      Cardiovascular             Pulmonary               OUTSIDE LABS:  CBC:   Lab Results   Component Value Date    WBC 6.3 2023    WBC 8.4 2023    HGB 16.9  "11/13/2023    HGB 17.8 (H) 08/16/2023    HCT 47.6 11/13/2023    HCT 48.4 08/16/2023     11/13/2023     08/16/2023     BMP:   Lab Results   Component Value Date     11/13/2023     (L) 08/16/2023    POTASSIUM 4.4 11/13/2023    POTASSIUM 4.2 08/16/2023    CHLORIDE 99 11/13/2023    CHLORIDE 98 08/16/2023    CO2 25 11/13/2023    CO2 26 08/16/2023    BUN 18.8 11/13/2023    BUN 22.5 (H) 08/16/2023    CR 0.85 11/13/2023    CR 1.04 08/16/2023     (H) 11/29/2023     (H) 11/29/2023     COAGS:   Lab Results   Component Value Date    PTT 28 11/13/2023    INR 0.89 11/13/2023     POC: No results found for: \"BGM\", \"HCG\", \"HCGS\"  HEPATIC:   Lab Results   Component Value Date    ALBUMIN 4.3 08/16/2023    PROTTOTAL 6.8 08/16/2023    ALT 33 08/16/2023    AST 29 08/16/2023    ALKPHOS 95 08/16/2023    BILITOTAL 0.7 08/16/2023     OTHER:   Lab Results   Component Value Date    A1C 9.1 (H) 11/13/2023    AILYN 9.4 11/13/2023    LIPASE 13 04/11/2019    TSH 1.09 07/28/2022    SED 6 04/09/2023       Anesthesia Plan    ASA Status:  3       Anesthesia Type: General.     - Airway: ETT              Consents    Anesthesia Plan(s) and associated risks, benefits, and realistic alternatives discussed. Questions answered and patient/representative(s) expressed understanding.     - Discussed: Risks, Benefits and Alternatives for BOTH SEDATION and the PROCEDURE were discussed     - Discussed with:  Patient            Postoperative Care    Pain management: IV analgesics, Oral pain medications.   PONV prophylaxis: Ondansetron (or other 5HT-3)     Comments:             King Mora, DO    I have reviewed the pertinent notes and labs in the chart from the past 30 days and (re)examined the patient.  Any updates or changes from those notes are reflected in this note.              # DMII: A1C = 9.1 % (Ref range: 0.0 - 5.6 %) within past 6 months  # Obesity: Estimated body mass index is 35.02 kg/m  as calculated " "from the following:    Height as of 11/13/23: 1.93 m (6' 4\").    Weight as of this encounter: 130.5 kg (287 lb 11.2 oz).      "

## 2023-12-12 ENCOUNTER — ALLIED HEALTH/NURSE VISIT (OUTPATIENT)
Dept: NEUROSURGERY | Facility: CLINIC | Age: 53
End: 2023-12-12
Payer: COMMERCIAL

## 2023-12-12 VITALS — DIASTOLIC BLOOD PRESSURE: 74 MMHG | HEART RATE: 68 BPM | RESPIRATION RATE: 18 BRPM | SYSTOLIC BLOOD PRESSURE: 130 MMHG

## 2023-12-12 DIAGNOSIS — M54.16 LUMBAR RADICULOPATHY: Primary | ICD-10-CM

## 2023-12-12 PROCEDURE — 99207 PR NO CHARGE NURSE ONLY: CPT

## 2023-12-12 RX ORDER — METHOCARBAMOL 500 MG/1
500 TABLET, FILM COATED ORAL 4 TIMES DAILY PRN
Qty: 40 TABLET | Refills: 0 | Status: SHIPPED | OUTPATIENT
Start: 2023-12-12 | End: 2024-01-16

## 2023-12-12 NOTE — PROGRESS NOTES
POSTOPERATIVE FOLLOW-UP NURSE VISIT NOTE    Procedure: left lumbar 4-lumbar 5 hemilaminectomy, medial facetectomy, foraminotomies and microdiscectomy     Date: 11/29/23    Surgeon: Dr. Grover    Pre-op symptoms: low back and left leg pain as well as numbness and tingling and weakness     Post-op symptoms: reports much improved back pain, endorses intermittent ache in bilateral posterior thigh, denies sensory or motor issues in LE    Pain and medications:   Tylenol prn  Methocarbamol 500 mg PO Q6: - refilled  Oxycodone 5 mg PO Q4: - stopped taking    Bracing compliance: n/a    Medications refilled: Robaxin 500 mg    Imaging ordered: N/A    Wound:  Vicryl, Monocryl, Dermabond      Surgical wound WNL - CDI, no signs of infection or skin breakdown.  Incision well-healed: good skin approximation, no redness or visible/palpable edema, no tenderness to palpation.  PT. AF, denies fever, chills or sweats.  Pt. reports that the symptoms are improved from pre-op.      Zakiya Paris RN, CNRN

## 2023-12-12 NOTE — PATIENT INSTRUCTIONS
A dressing is not required.    Keep the wound clean.    Wash your hands before touching the wound.  Ensure that anyone assisting you in the care of your wound washes her/his hands before touching the wound. Good handwashing can decrease the risk of serious infection.    If you are unable to see your wound, have someone check the wound daily for redness, swelling,or drainage. A small amount of drainage is normal.    You may shower.  Pat the wound dry. Do not rub.    No tub baths until the wound is well healed.  Usually 5-6 weeks.     If you develop redness, swelling, drainage, or temp 101 or greater, call our clinic.       * No lifting, pushing or pulling greater than 5-10 pound (this is about a gallon of milk) for the first 6 weeks after surgery .  *No repetitive bending, twisting, or jarring activities for 6 weeks.  *No overhead work  *No aerobic or strenuous activity  *No activities with increased risk of falls  *You may move about your home as tolerated  *You may walk up and down stairs as tolerated  *You may increase your activity slowly over the next 6 weeks    WALKING PROGRAM: As you can tolerate, walk daily-start with 5-10 minutes of continuous walking. This is in addition to the walking that you do as part of your daily activities. Increase the time that you walk by 5 minutes every couple of days. Do not exceed 30-45 minutes of continuous walking until seen in follow-up. Walking is the best exercise after surgery.  **Listen to your body, if you find that you are more painful or fatigued, you may need to proceed more slowly.    **Do not smoke or expose yourself to second hand smoke. Cigarette smoke can delay healing and cause complications.     DRIVING:  We recommend that you do not drive while taking medications for pain or muscle spasms. Always read and follow the advice on your prescription bottle. If you have questions, speak with your pharmacist.  We recommend that you do not drive while wearing a brace,  as it could limit your range of motion.    WORK: If you plan to return to work before your 6 week post-op appointment, call and discuss with one of the nurses in the neurosurgery office.

## 2024-01-04 ENCOUNTER — MYC MEDICAL ADVICE (OUTPATIENT)
Dept: FAMILY MEDICINE | Facility: CLINIC | Age: 54
End: 2024-01-04
Payer: COMMERCIAL

## 2024-01-04 DIAGNOSIS — Z12.5 SCREENING FOR PROSTATE CANCER: ICD-10-CM

## 2024-01-04 DIAGNOSIS — Z13.6 CARDIOVASCULAR SCREENING; LDL GOAL LESS THAN 100: ICD-10-CM

## 2024-01-04 DIAGNOSIS — E11.9 TYPE 2 DIABETES MELLITUS WITHOUT COMPLICATION, WITHOUT LONG-TERM CURRENT USE OF INSULIN (H): ICD-10-CM

## 2024-01-04 DIAGNOSIS — F32.0 MILD MAJOR DEPRESSION (H): ICD-10-CM

## 2024-01-04 DIAGNOSIS — E55.9 VITAMIN D DEFICIENCY: ICD-10-CM

## 2024-01-04 DIAGNOSIS — R79.89 ELEVATED FERRITIN: ICD-10-CM

## 2024-01-04 DIAGNOSIS — E29.1 HYPOGONADISM MALE: Primary | ICD-10-CM

## 2024-01-13 ASSESSMENT — ENCOUNTER SYMPTOMS
FEVER: 0
EYE PAIN: 0
DIARRHEA: 0
NAUSEA: 0
MYALGIAS: 1
NERVOUS/ANXIOUS: 0
FREQUENCY: 0
ABDOMINAL PAIN: 0
WEAKNESS: 1
CONSTIPATION: 0
SHORTNESS OF BREATH: 1
HEMATURIA: 0
HEARTBURN: 0
HEADACHES: 0
HEMATOCHEZIA: 0
JOINT SWELLING: 0
PALPITATIONS: 0
SORE THROAT: 0
DYSURIA: 0
PARESTHESIAS: 1
ARTHRALGIAS: 1
DIZZINESS: 0
CHILLS: 0
COUGH: 0

## 2024-01-16 ENCOUNTER — OFFICE VISIT (OUTPATIENT)
Dept: NEUROSURGERY | Facility: CLINIC | Age: 54
End: 2024-01-16
Payer: COMMERCIAL

## 2024-01-16 ENCOUNTER — LAB (OUTPATIENT)
Dept: LAB | Facility: CLINIC | Age: 54
End: 2024-01-16
Payer: COMMERCIAL

## 2024-01-16 VITALS — OXYGEN SATURATION: 98 % | HEART RATE: 95 BPM | DIASTOLIC BLOOD PRESSURE: 90 MMHG | SYSTOLIC BLOOD PRESSURE: 138 MMHG

## 2024-01-16 DIAGNOSIS — R79.89 ELEVATED FERRITIN: ICD-10-CM

## 2024-01-16 DIAGNOSIS — E29.1 HYPOGONADISM MALE: ICD-10-CM

## 2024-01-16 DIAGNOSIS — E55.9 VITAMIN D DEFICIENCY: ICD-10-CM

## 2024-01-16 DIAGNOSIS — F32.0 MILD MAJOR DEPRESSION (H): ICD-10-CM

## 2024-01-16 DIAGNOSIS — E11.9 TYPE 2 DIABETES MELLITUS WITHOUT COMPLICATION, WITHOUT LONG-TERM CURRENT USE OF INSULIN (H): ICD-10-CM

## 2024-01-16 DIAGNOSIS — Z13.6 CARDIOVASCULAR SCREENING; LDL GOAL LESS THAN 100: ICD-10-CM

## 2024-01-16 DIAGNOSIS — Z12.5 SCREENING FOR PROSTATE CANCER: ICD-10-CM

## 2024-01-16 DIAGNOSIS — M54.16 LUMBAR RADICULOPATHY: Primary | ICD-10-CM

## 2024-01-16 LAB
BASOPHILS # BLD AUTO: 0 10E3/UL (ref 0–0.2)
BASOPHILS NFR BLD AUTO: 0 %
EOSINOPHIL # BLD AUTO: 0.2 10E3/UL (ref 0–0.7)
EOSINOPHIL NFR BLD AUTO: 2 %
ERYTHROCYTE [DISTWIDTH] IN BLOOD BY AUTOMATED COUNT: 11.9 % (ref 10–15)
HBA1C MFR BLD: 8.8 % (ref 0–5.6)
HCT VFR BLD AUTO: 50.3 % (ref 40–53)
HGB BLD-MCNC: 18.7 G/DL (ref 13.3–17.7)
IMM GRANULOCYTES # BLD: 0 10E3/UL
IMM GRANULOCYTES NFR BLD: 1 %
LYMPHOCYTES # BLD AUTO: 2.1 10E3/UL (ref 0.8–5.3)
LYMPHOCYTES NFR BLD AUTO: 27 %
MCH RBC QN AUTO: 31.7 PG (ref 26.5–33)
MCHC RBC AUTO-ENTMCNC: 37.2 G/DL (ref 31.5–36.5)
MCV RBC AUTO: 85 FL (ref 78–100)
MONOCYTES # BLD AUTO: 0.7 10E3/UL (ref 0–1.3)
MONOCYTES NFR BLD AUTO: 9 %
NEUTROPHILS # BLD AUTO: 4.7 10E3/UL (ref 1.6–8.3)
NEUTROPHILS NFR BLD AUTO: 62 %
PLATELET # BLD AUTO: 301 10E3/UL (ref 150–450)
RBC # BLD AUTO: 5.89 10E6/UL (ref 4.4–5.9)
WBC # BLD AUTO: 7.7 10E3/UL (ref 4–11)

## 2024-01-16 PROCEDURE — 84100 ASSAY OF PHOSPHORUS: CPT

## 2024-01-16 PROCEDURE — 85025 COMPLETE CBC W/AUTO DIFF WBC: CPT

## 2024-01-16 PROCEDURE — 82306 VITAMIN D 25 HYDROXY: CPT

## 2024-01-16 PROCEDURE — 80061 LIPID PANEL: CPT

## 2024-01-16 PROCEDURE — 83735 ASSAY OF MAGNESIUM: CPT

## 2024-01-16 PROCEDURE — 99024 POSTOP FOLLOW-UP VISIT: CPT | Performed by: PHYSICIAN ASSISTANT

## 2024-01-16 PROCEDURE — 36415 COLL VENOUS BLD VENIPUNCTURE: CPT

## 2024-01-16 PROCEDURE — 82728 ASSAY OF FERRITIN: CPT

## 2024-01-16 PROCEDURE — 84403 ASSAY OF TOTAL TESTOSTERONE: CPT

## 2024-01-16 PROCEDURE — 80053 COMPREHEN METABOLIC PANEL: CPT

## 2024-01-16 PROCEDURE — 83036 HEMOGLOBIN GLYCOSYLATED A1C: CPT

## 2024-01-16 PROCEDURE — G0103 PSA SCREENING: HCPCS

## 2024-01-16 ASSESSMENT — PAIN SCALES - GENERAL: PAINLEVEL: MILD PAIN (2)

## 2024-01-16 NOTE — PROGRESS NOTES
NEUROSURGERY FOLLOW UP  NOTE 1/16/2024     Mr. Christopher is a 53 year old male who comes today in follow-up. postoperative Left lumbar 4-lumbar 5 hemilaminectomy, medial facetectomy, foraminotomies and microdiscectomy on 11/29/2023 by Dr. Grover     Presently patient states that he is doing very well. He has slight stiffness of his low back but denies any sharp pain. He states that his previous severe radicular leg pain has completely resolved. He notes continued lower extremity fatigue but feels that he is out of condition and just has not been doing much activity since surgery.    Preoperative low back and left leg pain as well as numbness and tingling and weakness       PHYSICAL EXAM:   BP (!) 138/90   Pulse 95   SpO2 98%      Mental Status: A & O in no acute distress.  Affect is appropriate.  Speech is fluent.  Recent and remote memory are intact.  Attention span and concentration are normal.     Motor:  Normal bulk and tone all muscle groups of lower extremities.     Sensory: Sensation intact bilaterally to light touch in LE     Incision: well healed without erythema, induration, or drainage        CONSULTATION ASSESSMENT AND PLAN:    Patient has been advised to begin physical therapy focusing on lumbar mobility and lower extremity strength. He can gradually get back into his normal activities by adding 5 pounds per week from a lifting perspective until back to his normal amount. He will remain out of work until February 19th while working with therapy and can return without restrictions at that time. Will follow up on on as needed basis and understands to contact the office should any symptoms arise.      Linda Gabriel PA-C  [facility] Neurosurgery  O: [phone]

## 2024-01-16 NOTE — LETTER
1/16/2024         RE: Morris Christopher  [address] [facility] [address]        Dear Colleague,    Thank you for referring your patient, Morris Christopher, to the [facility]. Please see a copy of my visit note below.    NEUROSURGERY FOLLOW UP  NOTE 1/16/2024     Mr. Christopher is a 53 year old male who comes today in follow-up. postoperative Left lumbar 4-lumbar 5 hemilaminectomy, medial facetectomy, foraminotomies and microdiscectomy on 11/29/2023 by Dr. Grover     Presently patient states that he is doing very well. He has no current complaints of neck pain and denies any radicular upper or lower extremity pain numbness tingling or weakness. He is very pleased with his progress since surgery. He denies any gait instability or changes in bowel or bladder habits.   Preoperative low back and left leg pain as well as numbness and tingling and weakness       PHYSICAL EXAM:   BP (!) 138/90   Pulse 95   SpO2 98%      Mental Status: A & O in no acute distress.  Affect is appropriate.  Speech is fluent.  Recent and remote memory are intact.  Attention span and concentration are normal.     Motor:  Normal bulk and tone all muscle groups of lower extremities.     Sensory: Sensation intact bilaterally to light touch in LE     Incision: well healed without erythema, induration, or drainage        CONSULTATION ASSESSMENT AND PLAN:    Patient has been advised to begin physical therapy focusing on lumbar mobility and lower extremity strength. He can gradually get back into his normal activities by adding 5 pounds per week from a lifting perspective until back to his normal amount. He will remain out of work until February 19th while working with therapy and can return without restrictions at that time. Will follow up on on as needed basis and understands to contact the office should any symptoms arise.      Linda Gabriel PA-C  [facility] Neurosurgery  O: [phone]             Again, thank you for allowing me to participate in the care of your patient.        Sincerely,        Linda Gabriel PA-C

## 2024-01-16 NOTE — PATIENT INSTRUCTIONS
Patient has been advised to begin physical therapy focusing on lumbar mobility and lower extremity strength. He can gradually get back into his normal activities by adding 5 pounds per week from a lifting perspective until back to his normal amount. He will remain out of work until February 19th while working with therapy and can return without restrictions at that time. Will follow up on on as needed basis and understands to contact the office should any symptoms arise.

## 2024-01-16 NOTE — NURSING NOTE
"Morris Christopher is a 53 year old male who presents for:  Chief Complaint   Patient presents with    RECHECK        Initial Vitals:  BP (!) 138/90   Pulse 95   SpO2 98%  Estimated body mass index is 35.02 kg/m  as calculated from the following:    Height as of 11/13/23: 6' 4\" (1.93 m).    Weight as of 11/29/23: 287 lb 11.2 oz (130.5 kg).. There is no height or weight on file to calculate BSA. BP completed using cuff size: regular  Mild Pain (2)    Demetris Hernandez    "

## 2024-01-17 ENCOUNTER — MYC REFILL (OUTPATIENT)
Dept: FAMILY MEDICINE | Facility: CLINIC | Age: 54
End: 2024-01-17
Payer: COMMERCIAL

## 2024-01-17 DIAGNOSIS — E11.9 TYPE 2 DIABETES MELLITUS WITHOUT COMPLICATION, WITHOUT LONG-TERM CURRENT USE OF INSULIN (H): ICD-10-CM

## 2024-01-17 DIAGNOSIS — E11.42 DIABETIC POLYNEUROPATHY ASSOCIATED WITH TYPE 2 DIABETES MELLITUS (H): ICD-10-CM

## 2024-01-17 DIAGNOSIS — E29.1 HYPOGONADISM MALE: ICD-10-CM

## 2024-01-17 DIAGNOSIS — E11.65 TYPE 2 DIABETES MELLITUS WITH HYPERGLYCEMIA, WITHOUT LONG-TERM CURRENT USE OF INSULIN (H): ICD-10-CM

## 2024-01-17 LAB
ALBUMIN SERPL BCG-MCNC: 4.5 G/DL (ref 3.5–5.2)
ALP SERPL-CCNC: 92 U/L (ref 40–150)
ALT SERPL W P-5'-P-CCNC: 31 U/L (ref 0–70)
ANION GAP SERPL CALCULATED.3IONS-SCNC: 10 MMOL/L (ref 7–15)
AST SERPL W P-5'-P-CCNC: 21 U/L (ref 0–45)
BILIRUB SERPL-MCNC: 1.3 MG/DL
BUN SERPL-MCNC: 11.6 MG/DL (ref 6–20)
CALCIUM SERPL-MCNC: 9.6 MG/DL (ref 8.6–10)
CHLORIDE SERPL-SCNC: 101 MMOL/L (ref 98–107)
CHOLEST SERPL-MCNC: 249 MG/DL
CREAT SERPL-MCNC: 1.04 MG/DL (ref 0.67–1.17)
DEPRECATED HCO3 PLAS-SCNC: 26 MMOL/L (ref 22–29)
EGFRCR SERPLBLD CKD-EPI 2021: 85 ML/MIN/1.73M2
FASTING STATUS PATIENT QL REPORTED: ABNORMAL
FERRITIN SERPL-MCNC: 811 NG/ML (ref 31–409)
GLUCOSE SERPL-MCNC: 214 MG/DL (ref 70–99)
HDLC SERPL-MCNC: 48 MG/DL
LDLC SERPL CALC-MCNC: 165 MG/DL
MAGNESIUM SERPL-MCNC: 1.9 MG/DL (ref 1.7–2.3)
NONHDLC SERPL-MCNC: 201 MG/DL
PHOSPHATE SERPL-MCNC: 3.1 MG/DL (ref 2.5–4.5)
POTASSIUM SERPL-SCNC: 4.9 MMOL/L (ref 3.4–5.3)
PROT SERPL-MCNC: 7.4 G/DL (ref 6.4–8.3)
PSA SERPL DL<=0.01 NG/ML-MCNC: 0.36 NG/ML (ref 0–3.5)
SODIUM SERPL-SCNC: 137 MMOL/L (ref 135–145)
TRIGL SERPL-MCNC: 180 MG/DL
VIT D+METAB SERPL-MCNC: 11 NG/ML (ref 20–50)

## 2024-01-17 RX ORDER — NEEDLES, DISPOSABLE 25GX5/8"
NEEDLE, DISPOSABLE MISCELLANEOUS
Qty: 6 EACH | Refills: 3 | OUTPATIENT
Start: 2024-01-17

## 2024-01-17 RX ORDER — TESTOSTERONE CYPIONATE 200 MG/ML
180 INJECTION, SOLUTION INTRAMUSCULAR
Qty: 2 ML | Refills: 5 | OUTPATIENT
Start: 2024-01-17

## 2024-01-17 RX ORDER — GLIPIZIDE 10 MG/1
10 TABLET ORAL
Qty: 180 TABLET | Refills: 0 | Status: SHIPPED | OUTPATIENT
Start: 2024-01-17 | End: 2024-04-10

## 2024-01-17 RX ORDER — GLUCOSAMINE HCL/CHONDROITIN SU 500-400 MG
CAPSULE ORAL
Qty: 300 EACH | Refills: 3 | Status: SHIPPED | OUTPATIENT
Start: 2024-01-17

## 2024-01-17 RX ORDER — NEEDLES, DISPOSABLE 25GX5/8"
1 NEEDLE, DISPOSABLE MISCELLANEOUS
Qty: 6 EACH | Refills: 3 | Status: SHIPPED | OUTPATIENT
Start: 2024-01-17 | End: 2024-06-14

## 2024-01-18 LAB — TESTOST SERPL-MCNC: 522 NG/DL (ref 240–950)

## 2024-01-19 ENCOUNTER — OFFICE VISIT (OUTPATIENT)
Dept: FAMILY MEDICINE | Facility: CLINIC | Age: 54
End: 2024-01-19
Payer: COMMERCIAL

## 2024-01-19 VITALS
HEART RATE: 101 BPM | TEMPERATURE: 97 F | DIASTOLIC BLOOD PRESSURE: 74 MMHG | SYSTOLIC BLOOD PRESSURE: 128 MMHG | HEIGHT: 76 IN | OXYGEN SATURATION: 96 % | WEIGHT: 288 LBS | BODY MASS INDEX: 35.07 KG/M2

## 2024-01-19 DIAGNOSIS — F32.0 MILD MAJOR DEPRESSION (H): ICD-10-CM

## 2024-01-19 DIAGNOSIS — E78.5 HYPERLIPIDEMIA LDL GOAL <70: ICD-10-CM

## 2024-01-19 DIAGNOSIS — F11.20 CONTINUOUS OPIOID DEPENDENCE (H): ICD-10-CM

## 2024-01-19 DIAGNOSIS — E66.01 MORBID OBESITY (H): ICD-10-CM

## 2024-01-19 DIAGNOSIS — E55.9 VITAMIN D DEFICIENCY: ICD-10-CM

## 2024-01-19 DIAGNOSIS — Z12.11 SCREEN FOR COLON CANCER: ICD-10-CM

## 2024-01-19 DIAGNOSIS — Z00.00 ROUTINE GENERAL MEDICAL EXAMINATION AT A HEALTH CARE FACILITY: Primary | ICD-10-CM

## 2024-01-19 DIAGNOSIS — Z23 NEED FOR PROPHYLACTIC VACCINATION AND INOCULATION AGAINST INFLUENZA: ICD-10-CM

## 2024-01-19 DIAGNOSIS — E11.42 DIABETIC POLYNEUROPATHY ASSOCIATED WITH TYPE 2 DIABETES MELLITUS (H): ICD-10-CM

## 2024-01-19 DIAGNOSIS — E11.65 TYPE 2 DIABETES MELLITUS WITH HYPERGLYCEMIA, WITHOUT LONG-TERM CURRENT USE OF INSULIN (H): ICD-10-CM

## 2024-01-19 PROCEDURE — 90686 IIV4 VACC NO PRSV 0.5 ML IM: CPT | Performed by: PHYSICIAN ASSISTANT

## 2024-01-19 PROCEDURE — 91320 SARSCV2 VAC 30MCG TRS-SUC IM: CPT | Performed by: PHYSICIAN ASSISTANT

## 2024-01-19 PROCEDURE — 90480 ADMN SARSCOV2 VAC 1/ONLY CMP: CPT | Performed by: PHYSICIAN ASSISTANT

## 2024-01-19 PROCEDURE — 99214 OFFICE O/P EST MOD 30 MIN: CPT | Mod: 24 | Performed by: PHYSICIAN ASSISTANT

## 2024-01-19 PROCEDURE — 99396 PREV VISIT EST AGE 40-64: CPT | Mod: 24 | Performed by: PHYSICIAN ASSISTANT

## 2024-01-19 PROCEDURE — 90471 IMMUNIZATION ADMIN: CPT | Performed by: PHYSICIAN ASSISTANT

## 2024-01-19 RX ORDER — DULAGLUTIDE 1.5 MG/.5ML
INJECTION, SOLUTION SUBCUTANEOUS
COMMUNITY
Start: 2023-09-28 | End: 2024-04-11

## 2024-01-19 RX ORDER — CITALOPRAM HYDROBROMIDE 20 MG/1
TABLET ORAL
Qty: 90 TABLET | Refills: 1 | Status: SHIPPED | OUTPATIENT
Start: 2024-01-19 | End: 2024-08-27

## 2024-01-19 RX ORDER — ERGOCALCIFEROL 1.25 MG/1
50000 CAPSULE, LIQUID FILLED ORAL WEEKLY
Qty: 12 CAPSULE | Refills: 0 | Status: SHIPPED | OUTPATIENT
Start: 2024-01-19 | End: 2024-04-06

## 2024-01-19 ASSESSMENT — PATIENT HEALTH QUESTIONNAIRE - PHQ9
10. IF YOU CHECKED OFF ANY PROBLEMS, HOW DIFFICULT HAVE THESE PROBLEMS MADE IT FOR YOU TO DO YOUR WORK, TAKE CARE OF THINGS AT HOME, OR GET ALONG WITH OTHER PEOPLE: VERY DIFFICULT
SUM OF ALL RESPONSES TO PHQ QUESTIONS 1-9: 19
SUM OF ALL RESPONSES TO PHQ QUESTIONS 1-9: 19

## 2024-01-19 ASSESSMENT — ENCOUNTER SYMPTOMS
JOINT SWELLING: 0
SORE THROAT: 0
DYSURIA: 0
PALPITATIONS: 0
MYALGIAS: 1
ABDOMINAL PAIN: 0
COUGH: 0
ARTHRALGIAS: 1
CONSTIPATION: 0
HEADACHES: 0
CHILLS: 0
NAUSEA: 0
DIARRHEA: 0
FREQUENCY: 0
FEVER: 0
DIZZINESS: 0
NERVOUS/ANXIOUS: 0
WEAKNESS: 1
EYE PAIN: 0
HEMATURIA: 0

## 2024-01-19 NOTE — PROGRESS NOTES
"Preventive Care Visit  [facility] RUDI Zhang PA-C, Family Medicine  Jan 19, 2024      SUBJECTIVE:   Gagan is a 53 year old, presenting for the following:  Physical, Flu Shot, and Imm/Inj (Flu Shot)        1/19/2024     1:39 PM   Additional Questions   Roomed by KATELYNN Vega     Healthy Habits:     Getting at least 3 servings of Calcium per day:  Yes    Bi-annual eye exam:  Yes    Dental care twice a year:  NO    Sleep apnea or symptoms of sleep apnea:  Daytime drowsiness    Diet:  Diabetic    Frequency of exercise:  None    Taking medications regularly:  Yes    Medication side effects:  None    Additional concerns today:  Yes    From a pain perspective patient is doing really good  Can't believe he had to deal with the pain for as long as he did until someone agreed to surgery  He is happy with results  Still has neuropathy in both feet but that has been ongoing and likely component of diabetic neuropathy    Financially he is stressed right now as [employer] has not paid even for the last 4 weeks despite approving his short term disability  He can barely afford food for him or his dogs    Emotionally he is not doing well and tears up in clinic today  He had been the primary caregiver for his wife who has advanced kidney disease and several other health problems  Because of his back he realized he was not able to take care of her  They are thinking she probably doesn't have much time left so they made the hard decision to have her move back to [place] to be with her kids  He knows that it will be better for her to re establish some relationships with her children before she passes but he is feeling guilty for not being able to take care of her    He stopped the cymbalta - he didn't like how he felt on it. If he missed a dose he would feel really \"dark\" which is not typical for him and he didn't like that  Prior to cymbalta he was on sertraline. Doesn't remember negative side effects per se but also " "doesn't remember it helping all that much    Social History     Tobacco Use    Smoking status: Former    Smokeless tobacco: Current     Types: Chew   Substance Use Topics    Alcohol use: Not Currently           1/13/2024     3:03 PM   Alcohol Use   Prescreen: >3 drinks/day or >7 drinks/week? No       Last PSA:   Prostate Specific Antigen Screen   Date Value Ref Range Status   01/16/2024 0.36 0.00 - 3.50 ng/mL Final   03/05/2021 0.4 0.0 - 3.5 ng/mL Final       Reviewed orders with patient. Reviewed health maintenance and updated orders accordingly - Yes  BP Readings from Last 3 Encounters:   01/19/24 128/74   01/16/24 (!) 138/90   12/12/23 130/74    Wt Readings from Last 3 Encounters:   01/19/24 130.6 kg (288 lb)   11/29/23 130.5 kg (287 lb 11.2 oz)   11/13/23 135.6 kg (299 lb)                    Reviewed and updated as needed this visit by clinical staff    Allergies  Meds              Reviewed and updated as needed this visit by Provider    Allergies                 Review of Systems   Constitutional:  Negative for chills and fever.   HENT:  Negative for congestion, ear pain, hearing loss and sore throat.    Eyes:  Negative for pain and visual disturbance.   Respiratory:  Negative for cough.    Cardiovascular:  Negative for chest pain and palpitations.   Gastrointestinal:  Negative for abdominal pain, constipation, diarrhea and nausea.   Genitourinary:  Negative for dysuria, frequency, genital sores, hematuria and penile discharge.   Musculoskeletal:  Positive for arthralgias and myalgias. Negative for joint swelling.   Skin:  Negative for rash.   Neurological:  Positive for weakness. Negative for dizziness and headaches.   Psychiatric/Behavioral:  The patient is not nervous/anxious.        OBJECTIVE:   /74   Pulse 101   Temp 97  F (36.1  C) (Tympanic)   Ht 1.93 m (6' 4\")   Wt 130.6 kg (288 lb)   SpO2 96%   BMI 35.06 kg/m     Estimated body mass index is 35.06 kg/m  as calculated from the " "following:    Height as of this encounter: 1.93 m (6' 4\").    Weight as of this encounter: 130.6 kg (288 lb).  Physical Exam  GENERAL: alert and no distress  EYES: Eyes grossly normal to inspection, PERRL and conjunctivae and sclerae normal  HENT: ear canals and TM's normal, nose and mouth without ulcers or lesions  NECK: no adenopathy, no asymmetry, masses, or scars  RESP: lungs clear to auscultation - no rales, rhonchi or wheezes  CV: regular rate and rhythm, normal S1 S2, no S3 or S4, no murmur, click or rub, no peripheral edema  ABDOMEN: soft, nontender, no hepatosplenomegaly, no masses and bowel sounds normal  MS: no gross musculoskeletal defects noted, no edema  SKIN: no suspicious lesions or rashes  NEURO: Normal strength and tone, mentation intact and speech normal  PSYCH: mentation appears normal, affect normal/bright  LYMPH: no cervical, supraclavicular, axillary, or inguinal adenopathy  Diabetic foot exam: normal DP and PT pulses, no trophic changes or ulcerative lesions, and abnormal sensory exam  Diagnostic Test Results:  Reviewed labs done earlier in the week    ASSESSMENT/PLAN:   (Z00.00) Routine general medical examination at a health care facility  (primary encounter diagnosis)  Comment:   Plan:     (Z12.11) Screen for colon cancer  Comment:   Plan: Colonoscopy Screening  Referral            (Z23) Need for prophylactic vaccination and inoculation against influenza  Comment:   Plan:     (E55.9) Vitamin D deficiency  Comment: low vitamin D level - will correct with 12 weeks of high dose D  Plan: vitamin D2 (ERGOCALCIFEROL) 25173 units (1250         mcg) capsule            (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 Has tried 1 selective serotonin reuptake inhibitor (zoloft) and 1 SNRI (cymbalta). Cymbalta was likely tried given pain issues with neuropathy and low back. Debated another ssri like celexa vs wellbutrin. Do have some concerns with wellbutrin and " "increased irritability given he is already noting those symptoms so will try celexa   Plan: citalopram (CELEXA) 20 MG tablet            (E78.5) Hyperlipidemia LDL goal <70  Comment:   Plan: reviewed recent lipid panel     (E11.65) Type 2 diabetes mellitus with hyperglycemia, without long-term current use of insulin (H)  Comment:   Plan: on trulicty and glipizide although not consistent with glipizide. A1c still above goal but down a little which is somewhat surprising given last few months of back pain and prednisone use so hopefully now that back issue is resolved the A1c will trend down further    (E66.01) Morbid obesity (H)  Comment:   Plan: on trulicity which is helping and will be able to start increasing activity    (E11.42) Diabetic polyneuropathy associated with type 2 diabetes mellitus (H)  Comment:   Plan: stable. He really doesn't like taking medications so will monitor symptoms for now    (F11.20) Continuous opioid dependence (H) - F11.2  Comment:   Plan: previously with back issues - now off all medications        Patient has been advised of split billing requirements and indicates understanding: Yes      Counseling  Reviewed preventive health counseling, as reflected in patient instructions      BMI  Estimated body mass index is 35.06 kg/m  as calculated from the following:    Height as of this encounter: 1.93 m (6' 4\").    Weight as of this encounter: 130.6 kg (288 lb).   Weight management plan: on trulicity      He reports that he has quit smoking. His smokeless tobacco use includes chew.          Signed Electronically by: Salima Zhang PA-C  "

## 2024-01-20 PROBLEM — E78.5 HYPERLIPIDEMIA LDL GOAL <70: Status: ACTIVE | Noted: 2024-01-20

## 2024-01-20 PROBLEM — F11.20 CONTINUOUS OPIOID DEPENDENCE (H): Status: ACTIVE | Noted: 2024-01-20

## 2024-01-20 RX ORDER — ATORVASTATIN CALCIUM 20 MG/1
20 TABLET, FILM COATED ORAL DAILY
COMMUNITY
Start: 2024-01-20 | End: 2024-06-14

## 2024-01-26 ENCOUNTER — DOCUMENTATION ONLY (OUTPATIENT)
Dept: FAMILY MEDICINE | Facility: CLINIC | Age: 54
End: 2024-01-26
Payer: COMMERCIAL

## 2024-01-26 DIAGNOSIS — R79.89 ELEVATED FERRITIN, HEMOGLOBIN, AND RED BLOOD CELL COUNT (H): Primary | ICD-10-CM

## 2024-01-26 DIAGNOSIS — R71.8 ELEVATED FERRITIN, HEMOGLOBIN, AND RED BLOOD CELL COUNT (H): Primary | ICD-10-CM

## 2024-01-26 DIAGNOSIS — D58.2 ELEVATED FERRITIN, HEMOGLOBIN, AND RED BLOOD CELL COUNT (H): Primary | ICD-10-CM

## 2024-02-02 ENCOUNTER — LAB (OUTPATIENT)
Dept: LAB | Facility: CLINIC | Age: 54
End: 2024-02-02
Payer: COMMERCIAL

## 2024-02-02 DIAGNOSIS — R71.8 ELEVATED FERRITIN, HEMOGLOBIN, AND RED BLOOD CELL COUNT (H): ICD-10-CM

## 2024-02-02 DIAGNOSIS — R79.89 ELEVATED FERRITIN, HEMOGLOBIN, AND RED BLOOD CELL COUNT (H): ICD-10-CM

## 2024-02-02 DIAGNOSIS — D58.2 ELEVATED FERRITIN, HEMOGLOBIN, AND RED BLOOD CELL COUNT (H): ICD-10-CM

## 2024-02-02 LAB
BASOPHILS # BLD AUTO: 0 10E3/UL (ref 0–0.2)
BASOPHILS NFR BLD AUTO: 0 %
EOSINOPHIL # BLD AUTO: 0.2 10E3/UL (ref 0–0.7)
EOSINOPHIL NFR BLD AUTO: 3 %
ERYTHROCYTE [DISTWIDTH] IN BLOOD BY AUTOMATED COUNT: 12.5 % (ref 10–15)
FERRITIN SERPL-MCNC: 642 NG/ML (ref 31–409)
HCT VFR BLD AUTO: 51.8 % (ref 40–53)
HGB BLD-MCNC: 18.5 G/DL (ref 13.3–17.7)
IMM GRANULOCYTES # BLD: 0 10E3/UL
IMM GRANULOCYTES NFR BLD: 0 %
LYMPHOCYTES # BLD AUTO: 3 10E3/UL (ref 0.8–5.3)
LYMPHOCYTES NFR BLD AUTO: 41 %
MCH RBC QN AUTO: 31.9 PG (ref 26.5–33)
MCHC RBC AUTO-ENTMCNC: 35.7 G/DL (ref 31.5–36.5)
MCV RBC AUTO: 89 FL (ref 78–100)
MONOCYTES # BLD AUTO: 0.8 10E3/UL (ref 0–1.3)
MONOCYTES NFR BLD AUTO: 11 %
NEUTROPHILS # BLD AUTO: 3.3 10E3/UL (ref 1.6–8.3)
NEUTROPHILS NFR BLD AUTO: 45 %
PLATELET # BLD AUTO: 278 10E3/UL (ref 150–450)
RBC # BLD AUTO: 5.8 10E6/UL (ref 4.4–5.9)
WBC # BLD AUTO: 7.3 10E3/UL (ref 4–11)

## 2024-02-02 PROCEDURE — 85025 COMPLETE CBC W/AUTO DIFF WBC: CPT

## 2024-02-02 PROCEDURE — 36415 COLL VENOUS BLD VENIPUNCTURE: CPT

## 2024-02-02 PROCEDURE — 82728 ASSAY OF FERRITIN: CPT

## 2024-02-14 ENCOUNTER — MYC MEDICAL ADVICE (OUTPATIENT)
Dept: FAMILY MEDICINE | Facility: CLINIC | Age: 54
End: 2024-02-14
Payer: COMMERCIAL

## 2024-02-14 DIAGNOSIS — E29.1 HYPOGONADISM MALE: ICD-10-CM

## 2024-02-14 DIAGNOSIS — M51.369 BULGING LUMBAR DISC: Primary | ICD-10-CM

## 2024-02-14 DIAGNOSIS — M62.830 BACK MUSCLE SPASM: ICD-10-CM

## 2024-02-15 RX ORDER — METHOCARBAMOL 500 MG/1
500 TABLET, FILM COATED ORAL 4 TIMES DAILY PRN
Qty: 60 TABLET | Refills: 3 | Status: SHIPPED | OUTPATIENT
Start: 2024-02-15 | End: 2024-05-23

## 2024-02-19 RX ORDER — TESTOSTERONE CYPIONATE 200 MG/ML
180 INJECTION, SOLUTION INTRAMUSCULAR
Qty: 2 ML | Refills: 5 | Status: SHIPPED | OUTPATIENT
Start: 2024-02-19 | End: 2024-08-20

## 2024-03-28 ENCOUNTER — TELEPHONE (OUTPATIENT)
Dept: FAMILY MEDICINE | Facility: CLINIC | Age: 54
End: 2024-03-28
Payer: COMMERCIAL

## 2024-03-28 DIAGNOSIS — E11.65 TYPE 2 DIABETES MELLITUS WITH HYPERGLYCEMIA, WITHOUT LONG-TERM CURRENT USE OF INSULIN (H): ICD-10-CM

## 2024-03-28 RX ORDER — DULAGLUTIDE 3 MG/.5ML
3 INJECTION, SOLUTION SUBCUTANEOUS
Qty: 2 ML | Refills: 2 | Status: SHIPPED | OUTPATIENT
Start: 2024-03-28 | End: 2024-08-05

## 2024-04-01 NOTE — TELEPHONE ENCOUNTER
PA Initiation    Medication: TESTOSTERONE CYPIONATE 200 MG/ML IM SOLN  Insurance Company: [employer] - Phone [phone] Fax [phone]  Pharmacy Filling the Rx: [place], [place] - [facility]  Filling Pharmacy Phone: [phone]  Filling Pharmacy Fax: [phone]  Start Date: 4/1/2024

## 2024-04-01 NOTE — TELEPHONE ENCOUNTER
Prior Authorization Approval    Medication: TESTOSTERONE CYPIONATE 200 MG/ML IM SOLN  Authorization Effective Date: 4/1/2024  Authorization Expiration Date: 4/1/2025  Approved Dose/Quantity:   Reference #:     Insurance Company: [employer] [phone] Fax [phone]  Expected CoPay: $    CoPay Card Available:      Financial Assistance Needed:   Which Pharmacy is filling the prescription: [facility] [address]  Pharmacy Notified: YES  Patient Notified: **Instructed pharmacy to notify patient when script is ready to /ship.**

## 2024-04-09 DIAGNOSIS — E11.42 DIABETIC POLYNEUROPATHY ASSOCIATED WITH TYPE 2 DIABETES MELLITUS (H): ICD-10-CM

## 2024-04-09 DIAGNOSIS — E11.9 TYPE 2 DIABETES MELLITUS WITHOUT COMPLICATION, WITHOUT LONG-TERM CURRENT USE OF INSULIN (H): ICD-10-CM

## 2024-04-10 RX ORDER — GLIPIZIDE 10 MG/1
10 TABLET ORAL
Qty: 180 TABLET | Refills: 0 | Status: SHIPPED | OUTPATIENT
Start: 2024-04-10 | End: 2024-07-09

## 2024-04-11 ENCOUNTER — VIRTUAL VISIT (OUTPATIENT)
Dept: FAMILY MEDICINE | Facility: CLINIC | Age: 54
End: 2024-04-11
Payer: COMMERCIAL

## 2024-04-11 DIAGNOSIS — S39.012A STRAIN OF LUMBAR REGION, INITIAL ENCOUNTER: Primary | ICD-10-CM

## 2024-04-11 DIAGNOSIS — Z98.890 H/O LUMBOSACRAL SPINE SURGERY: ICD-10-CM

## 2024-04-11 PROCEDURE — 99213 OFFICE O/P EST LOW 20 MIN: CPT | Mod: 95 | Performed by: PHYSICIAN ASSISTANT

## 2024-04-11 ASSESSMENT — ANXIETY QUESTIONNAIRES
4. TROUBLE RELAXING: SEVERAL DAYS
GAD7 TOTAL SCORE: 5
GAD7 TOTAL SCORE: 5
3. WORRYING TOO MUCH ABOUT DIFFERENT THINGS: SEVERAL DAYS
8. IF YOU CHECKED OFF ANY PROBLEMS, HOW DIFFICULT HAVE THESE MADE IT FOR YOU TO DO YOUR WORK, TAKE CARE OF THINGS AT HOME, OR GET ALONG WITH OTHER PEOPLE?: SOMEWHAT DIFFICULT
7. FEELING AFRAID AS IF SOMETHING AWFUL MIGHT HAPPEN: NOT AT ALL
IF YOU CHECKED OFF ANY PROBLEMS ON THIS QUESTIONNAIRE, HOW DIFFICULT HAVE THESE PROBLEMS MADE IT FOR YOU TO DO YOUR WORK, TAKE CARE OF THINGS AT HOME, OR GET ALONG WITH OTHER PEOPLE: SOMEWHAT DIFFICULT
7. FEELING AFRAID AS IF SOMETHING AWFUL MIGHT HAPPEN: NOT AT ALL
1. FEELING NERVOUS, ANXIOUS, OR ON EDGE: NOT AT ALL
5. BEING SO RESTLESS THAT IT IS HARD TO SIT STILL: NOT AT ALL
6. BECOMING EASILY ANNOYED OR IRRITABLE: MORE THAN HALF THE DAYS
2. NOT BEING ABLE TO STOP OR CONTROL WORRYING: SEVERAL DAYS
GAD7 TOTAL SCORE: 5

## 2024-04-11 ASSESSMENT — PATIENT HEALTH QUESTIONNAIRE - PHQ9
SUM OF ALL RESPONSES TO PHQ QUESTIONS 1-9: 9
10. IF YOU CHECKED OFF ANY PROBLEMS, HOW DIFFICULT HAVE THESE PROBLEMS MADE IT FOR YOU TO DO YOUR WORK, TAKE CARE OF THINGS AT HOME, OR GET ALONG WITH OTHER PEOPLE: SOMEWHAT DIFFICULT
SUM OF ALL RESPONSES TO PHQ QUESTIONS 1-9: 9

## 2024-04-11 NOTE — LETTER
Morris Christopher, male, 1970  [address] [facility] [address]      Date of Treatment: 4/11/2024    Date of Accident: 4/7/2024    History: Was assisting an ED patient into bed. Patient weight ~350lb and when she went to step up into the bed she grabbed onto patient putting all her weight onto him                        Patient reports pain over low back, sides (not spine) with muscle tightening and spasms thatn extend into his buttocks. Pain worsens the longer he is on his feet.     Has h/o recent lumbar spine surgery on 11/29/2023      Diagnosis:   1. Strain of lumbar region, initial encounter    2. H/O lumbosacral spine surgery        Work Related:  yes    Lab Results:   N/A    The employee is UNABLE to return to work through 4/17/2024        Other restrictions: None    FOLLOW-UP  Will follow up on 4/16/24 to determine ability to return to work and if any restrictions are needed.    MD:  Salima Zhang PA-C

## 2024-04-11 NOTE — PROGRESS NOTES
Gagan is a 54 year old who is being evaluated via a billable video visit.    How would you like to obtain your AVS? Alfredohart  If the video visit is dropped, the invitation should be resent by: Text to cell phone: [phone]  Will anyone else be joining your video visit? No      Assessment & Plan       ICD-10-CM    1. Strain of lumbar region, initial encounter  S39.012A       2. H/O lumbosacral spine surgery  Z98.890         H/o lumbar surgery as noted below on 11/29/23 - still recovering in terms of strength but back to work and was doing well until recent incident. 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 She weighed about 350lbs    Doesn't feel it is related to recent surgery   Feels more along the sides and muscular       Felt a little better the following day when he went to work  The longer he is on his feet the mores spasms he gets    Pain is along both sides, outside around hip  The more he moves, it gets tight, spasms and moves in the buttocks    A little weakness in the legs - doesn't think it is new. Still building strength from surgery     Alternating ibuprofen and  tylenol every 2-3 hours  Methocarbamol for muscle spasms    Pain is tolerable until it gets tight  Resting will ease the spasms along with the muscle relaxer    Was an overnight shift Saturday to Sunday morning  Occurred an hour before shift was over  Was not able to go back Sunday, Tuesday or Wednesday    Had picked up a bunch of shifts (Sat/Sun) Off, Mon and Tuesday Thursday would be next scheduled shift      11/29/23 - S/p left L4/5 hemilaminectomy, medial facetectomy, foraminotomies, and microdiscectomy          Review of Systems  Remainder of ROS obtained and found to be negative other than that which was documented above        Objective           Vitals:  No vitals were obtained today due to virtual visit.    Physical Exam   GENERAL: alert and no distress  EYES: Eyes grossly normal to inspection.  No discharge or erythema, or obvious scleral/conjunctival abnormalities.  RESP: No audible wheeze, cough, or visible cyanosis.    SKIN: Visible skin clear. No significant rash, abnormal pigmentation or lesions.  NEURO: Cranial nerves grossly intact.  Mentation and speech appropriate for age.  PSYCH: Appropriate affect, tone, and pace of words          Video-Visit Details    Type of service:  Video Visit   Originating Location (pt. Location): Home    Distant Location (provider location):  On-site  Platform used for Video Visit: Jorge  Signed Electronically by: Salima Zhang PA-C    6:46-7:03

## 2024-04-12 ENCOUNTER — MYC MEDICAL ADVICE (OUTPATIENT)
Dept: FAMILY MEDICINE | Facility: CLINIC | Age: 54
End: 2024-04-12
Payer: COMMERCIAL

## 2024-04-16 ENCOUNTER — VIRTUAL VISIT (OUTPATIENT)
Dept: FAMILY MEDICINE | Facility: CLINIC | Age: 54
End: 2024-04-16
Payer: OTHER MISCELLANEOUS

## 2024-04-16 DIAGNOSIS — S39.012D STRAIN OF LUMBAR REGION, SUBSEQUENT ENCOUNTER: Primary | ICD-10-CM

## 2024-04-16 DIAGNOSIS — Z98.890 H/O LUMBOSACRAL SPINE SURGERY: ICD-10-CM

## 2024-04-16 PROCEDURE — 99213 OFFICE O/P EST LOW 20 MIN: CPT | Mod: 95 | Performed by: PHYSICIAN ASSISTANT

## 2024-04-16 NOTE — LETTER
REPORT OF WORK COMP    [facility]  [address]  [facility] [address]  [phone]      PATIENT DATA    Employee Name: Morris Christopher      : 1970     #: [id number]    Work related injury: Yes  Claim number: [id number]   Employed elsewhere? No    Today's date: 2024  Date of injury: 2024  Date of first visit: 2024    PROVIDER EVALUATION: Please fill in as needed.  Please give copy to employee for employer.    1. Diagnosis: Strain of lumbar region; h/o lumbosacral spine surgery 2023    2. Treatment: Rest, work restrictions to avoid further injury, over the counter NSAIDS/tylenol, muscle relaxers prn.  3. Medication: robaxin prn, tylenol and/or ibuprofen prn  NOTE: When ordering a medication, [place] Rules require Work Comp or WC on prescriptions.    4. No work from 2024 (initial visit) to 2024.  5. Return to work date: 2024   ** WITH RESTRICTIONS? 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

## 2024-04-16 NOTE — PROGRESS NOTES
Gagan is a 54 year old who is being evaluated via a billable video visit.    How would you like to obtain your AVS? MyChart  If the video visit is dropped, the invitation should be resent by: Text to cell phone: [phone]  Will anyone else be joining your video visit? No      Assessment & Plan       ICD-10-CM    1. Strain of lumbar region, subsequent encounter  S39.012D       2. H/O lumbosacral spine surgery  Z98.890         Improving but slowly although not surprising given recent surgery and recovery ongoing still from that  Per conversation with spine surgeon, no change in care other than to limit activities that aggravate the back further  Patient would like to try and go back to light duty next week   Workability letter completed and in my chart for patient to print and given to employer  Follow up in 2 weeks to determine readiness to go back to full duty      Subjective   Gagan is a 54 year old, presenting for the following health issues:  Work Comp        4/16/2024    11:26 AM   Additional Questions   Roomed by KATELYNN Vega     History of Present Illness       Back Pain:  He presents for follow up of back pain. Patient's back pain is a new problem.    Original cause of back pain: work related injury  First noticed back pain: in the last week  Patient feels back pain: constantlyLocation of back pain:  Right lower back, left lower back, right side of waist and left side of waist  Description of back pain: dull ache, shooting and other  Back pain spreads: right buttocks and left buttocks    Since patient first noticed back pain, pain is: always present, but gets better and worse  Does back pain interfere with his job:  Yes  On a scale of 1-10 (10 being the worst), patient describes pain as:  7  What makes back pain worse: bending, certain positions, sitting and twisting   Acupuncture: not tried  Acetaminophen: helpful  Activity or exercise: not helpful  Chiropractor:  Not tried  Cold: helpful  Heat:  helpful  Massage: not tried  Muscle relaxants: helpful  NSAIDS: helpful  Opioids: not tried  Physical Therapy: not tried  Rest: helpful  Steroid Injection: not tried  Stretching: not helpful  Surgery: not tried  TENS unit: not tried  Topical pain relievers: not helpful  Other healthcare providers patient is seeing for back pain: NoneHe consumes 0 sweetened beverage(s) daily.He exercises with enough effort to increase his heart rate 10 to 19 minutes per day.  He exercises with enough effort to increase his heart rate 3 or less days per week. He is missing 2 dose(s) of medications per week.  He is not taking prescribed medications regularly due to remembering to take.     -He said the pain is a little better. He said it is still hard to bend.     Still can't bend 'far'   Pain is really only when he bends - bending to get up or bending to get down   Once he gets straight he is okay but if he is on his feet for too long (15+ min) or sitting for too long it starts to tighten up   If he has something to support himself (like leaning on counter) he can stand a little longer    He tried calling ortho to discuss if he should be seen sooner but no appts available until the end of Mayt  He did talk to the surgeon as she was seeing his wife and was advised that as long as pain wasn't worsening or getting back to the point it was prior to surgery there was probably little different to do at this point    In regards to work/return to work  He would like to go back to light duty starting next week if possible  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    Physical Exam   GENERAL: alert and no distress  EYES: Eyes grossly normal to inspection.  No discharge or erythema, or obvious scleral/conjunctival abnormalities.  RESP: No audible wheeze, cough, or visible cyanosis.    SKIN: Visible skin clear. No significant rash, abnormal pigmentation or lesions.  NEURO: Cranial nerves grossly intact.  Mentation and speech appropriate for age.  PSYCH: Appropriate affect, tone, and pace of words          Video-Visit Details    Type of service:  Video Visit   Originating Location (pt. Location): Home    Distant Location (provider location):  On-site  Platform used for Video Visit: Jorge  Signed Electronically by: Salima Zhang PA-C

## 2024-05-02 ENCOUNTER — MYC MEDICAL ADVICE (OUTPATIENT)
Dept: FAMILY MEDICINE | Facility: CLINIC | Age: 54
End: 2024-05-02

## 2024-05-02 ENCOUNTER — E-VISIT (OUTPATIENT)
Dept: FAMILY MEDICINE | Facility: CLINIC | Age: 54
End: 2024-05-02
Payer: COMMERCIAL

## 2024-05-02 DIAGNOSIS — S39.012D STRAIN OF LUMBAR REGION, SUBSEQUENT ENCOUNTER: Primary | ICD-10-CM

## 2024-05-02 PROCEDURE — 99421 OL DIG E/M SVC 5-10 MIN: CPT | Performed by: PHYSICIAN ASSISTANT

## 2024-05-16 ENCOUNTER — OFFICE VISIT (OUTPATIENT)
Dept: FAMILY MEDICINE | Facility: CLINIC | Age: 54
End: 2024-05-16
Payer: OTHER MISCELLANEOUS

## 2024-05-16 VITALS
DIASTOLIC BLOOD PRESSURE: 76 MMHG | WEIGHT: 291 LBS | BODY MASS INDEX: 35.44 KG/M2 | HEART RATE: 86 BPM | HEIGHT: 76 IN | SYSTOLIC BLOOD PRESSURE: 130 MMHG | OXYGEN SATURATION: 94 % | TEMPERATURE: 97.1 F

## 2024-05-16 DIAGNOSIS — S39.012D STRAIN OF LUMBAR REGION, SUBSEQUENT ENCOUNTER: Primary | ICD-10-CM

## 2024-05-16 PROCEDURE — 99214 OFFICE O/P EST MOD 30 MIN: CPT | Performed by: PHYSICIAN ASSISTANT

## 2024-05-16 ASSESSMENT — PATIENT HEALTH QUESTIONNAIRE - PHQ9
SUM OF ALL RESPONSES TO PHQ QUESTIONS 1-9: 6
SUM OF ALL RESPONSES TO PHQ QUESTIONS 1-9: 6
10. IF YOU CHECKED OFF ANY PROBLEMS, HOW DIFFICULT HAVE THESE PROBLEMS MADE IT FOR YOU TO DO YOUR WORK, TAKE CARE OF THINGS AT HOME, OR GET ALONG WITH OTHER PEOPLE: SOMEWHAT DIFFICULT

## 2024-05-16 NOTE — LETTER
[facility]  [address] [facility] [address]  Phone: [phone]      REPORT OF WORK ABILITY  Employee Name:   Morris Christopher        : 1970     SS#: [id number]     Work related injury: Yes  Claim number: [id number]   Employed elsewhere? No     Today's date: 2024  Date of injury: 2024  Date of first visit: 2024     PROVIDER EVALUATION:      1. Diagnosis: Strain of lumbar region; h/o lumbosacral spine surgery 2023     2. Treatment: Rest, work restrictions to avoid further injury, over the counter NSAIDS/tylenol, muscle relaxers prn, physical therapy  3. Medication: robaxin prn, tylenol and/or ibuprofen prn     4. No work from 2024 (initial visit) to 2024.  5. 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 to respond quickly without risking further injury to self and being unable to help patient  - Allow for frequent change in positions - no sitting or standing for longer than 30 min at a time without being able to change position  - No lifting 0-5lb from below waist level (that would require bending)  - No pushing/pulling above 20lbs     ACTIVITIES he IS ABLE to do that have been added:   - Able to do IV starts as he can adjust the bed so he can stand  - Able to help pass medications  - Able to run items on floor as needed  - Able to set up isolation rooms  - Able to stock shelves as long as no bending/twisting at waist         DURATION OF LIMITATIONS: An additional 4 weeks from end of last restrictions which was 2024. So current restrictions and abilities through 2024        If work restrictions is not available, the employee is totally disabled  Next appointment: 4 weeks           Salima Zhang PA-C

## 2024-05-16 NOTE — PROGRESS NOTES
Assessment & Plan     (S39.012D) Strain of lumbar region, subsequent encounter  (primary encounter diagnosis)  Comment: improving but slowly. Will expand work restrictions for the next month but still with specific restrictions in terms of lifting/bending and direct patient care. Will also refer to PT at this point given he is anxious to get back to work and recovery has been slower than he was hoping for.   Plan: Physical Therapy  Referral          34 minutes spent in patient visit doing chart review, documentation and discussion with patient    Subjective   Gagan is a 54 year old, presenting for the following health issues:  Work Comp        5/16/2024     1:35 PM   Additional Questions   Roomed by KATELYNN Vega     History of Present Illness       Back Pain:  He presents for follow up of back pain. Patient's back pain is a recurring problem.  Location of back pain:  Right lower back, left lower back, right buttock and left buttock  Description of back pain: cramping, sharp and stabbing  Back pain spreads: right buttocks and left buttocks    Since patient first noticed back pain, pain is: always present, but gets better and worse  Does back pain interfere with his job:  Yes       He eats 0-1 servings of fruits and vegetables daily.He consumes 0 sweetened beverage(s) daily.He exercises with enough effort to increase his heart rate 9 or less minutes per day.  He exercises with enough effort to increase his heart rate 3 or less days per week. He is missing 2 dose(s) of medications per week.  He is not taking prescribed medications regularly due to remembering to take.     Here with [facility]     Not sleeping   Seems to be tossing/turning and only getting 4-5 hours before he has to get up and then he tends to be up for the day    Still having 'spasms'   Describes it as feeling like a belt across his lower back that will gradually get tighter and squeeze the low back. Goes a little into his butt but does NOT go down his  "leg    Robaxin helps but doesn't seem to prevent the spasms from happening  Tried a TENS unit but felt that made it worse  Uses heat  Tried aspercream - minimal relief     Has been tried to walk to get back in better condition but will only make it about 30 min at most and then the back will start to cramp up  Same thing happens if he sits too long    If he is lifting something standing straight he is okay but as soon as he has to lean forward he will feel the pain and feels 'weak'  Also has trouble with twisting    Currently limited to mostly sitting things at work based on restrictions  Feels he could expand some of his duties including -  IV starts - he is able to adjust the bed height so he would not need to bend  Can help pass medicaitons  He can run items if needed  Can set up isolations rooms  Can stock shelves as long as no bending     Still does not feel he could do direct patient care as he would be concerned that if the patient coded or needed rapid assistance he would not be able to respond in a timely fashion but would try and then would injure himself further and possibly not be helpful to the patient           Review of Systems  Remainder of ROS obtained and found to be negative other than that which was documented above        Objective    /76   Pulse 86   Temp 97.1  F (36.2  C) (Tympanic)   Ht 1.93 m (6' 4\")   Wt 132 kg (291 lb)   SpO2 94%   BMI 35.42 kg/m    Body mass index is 35.42 kg/m .  Physical Exam   GENERAL: alert and no distress  PSYCH: pleasant, in no acute distress on sitting. Slow to sit to stand with careful gait noted            Signed Electronically by: Salima Zhang PA-C    "

## 2024-05-23 DIAGNOSIS — M51.369 BULGING LUMBAR DISC: ICD-10-CM

## 2024-05-23 DIAGNOSIS — M62.830 BACK MUSCLE SPASM: ICD-10-CM

## 2024-05-24 ENCOUNTER — THERAPY VISIT (OUTPATIENT)
Dept: PHYSICAL THERAPY | Facility: CLINIC | Age: 54
End: 2024-05-24
Payer: OTHER MISCELLANEOUS

## 2024-05-24 DIAGNOSIS — S39.012D STRAIN OF LUMBAR REGION, SUBSEQUENT ENCOUNTER: ICD-10-CM

## 2024-05-24 PROCEDURE — 97035 APP MDLTY 1+ULTRASOUND EA 15: CPT | Mod: GP | Performed by: PHYSICAL THERAPIST

## 2024-05-24 PROCEDURE — 97162 PT EVAL MOD COMPLEX 30 MIN: CPT | Mod: GP | Performed by: PHYSICAL THERAPIST

## 2024-05-24 PROCEDURE — 97110 THERAPEUTIC EXERCISES: CPT | Mod: GP | Performed by: PHYSICAL THERAPIST

## 2024-05-24 RX ORDER — METHOCARBAMOL 500 MG/1
500 TABLET, FILM COATED ORAL 4 TIMES DAILY PRN
Qty: 60 TABLET | Refills: 3 | Status: SHIPPED | OUTPATIENT
Start: 2024-05-24

## 2024-05-24 NOTE — PROGRESS NOTES
PHYSICAL THERAPY EVALUATION  Type of Visit: Evaluation    See electronic medical record for Abuse and Falls Screening details.    Subjective       Presenting condition or subjective complaint: lower back spasms and stiffness from work injury that occured 5 months after spine surgery  Date of onset: 04/06/24    Relevant medical history: Diabetes; Overweight   Dates & types of surgery: decompression/laminectomy 11/29/23, Shoulder repair without hardware June 2017, Arthroscopic bilateral Knee Sx in 1985/1986, tonsillectomy 50 years or so ago.    Prior diagnostic imaging/testing results:       Prior therapy history for the same diagnosis, illness or injury: No      Prior Level of Function  Transfers:   Ambulation:   ADL:   IADL:     Living Environment  Social support: With a significant other or spouse   Type of home: House   Stairs to enter the home: Yes 3 Is there a railing: Yes   Ramp: No   Stairs inside the home: Yes 8 Is there a railing: Yes   Help at home: Other  Equipment owned:       Employment: Yes Registered Nurse  Hobbies/Interests: fishing, hiking, pets, nature    Patient goals for therapy: work, fish, return to my baseline for movement    Pain assessment: Pain present, was doing well after surgery until he strained back doing a transfer.  He gets occasional spasms, can happen bending or sitting.  He just has to change position every half hour.  Laying down seems to be ok.     Objective   LUMBAR SPINE EVALUATION  PAIN: Pain is Exacerbated By: sitting, walking, bending  INTEGUMENTARY (edema, incisions):   POSTURE:   GAIT:   Weightbearing Status:   Assistive Device(s):   Gait Deviations:   BALANCE/PROPRIOCEPTION:   WEIGHTBEARING ALIGNMENT: Lumbar/Pelvic WB Alignment:Lordosis decreased  NON-WEIGHTBEARING ALIGNMENT:    ROM:  flexion: hands to knees, painful, extension: prone on elbow, increased pain, motion improved with reps  PELVIC/SI SCREEN:   STRENGTH:     MYOTOMES: WNL  DTR S:   CORD SIGNS:   DERMATOMES:    NEURAL TENSION:  neg SLR  FLEXIBILITY:  decreased hamstring flexibility  LUMBAR/HIP Special Tests:    PELVIS/SI SPECIAL TESTS:   FUNCTIONAL TESTS:   PALPATION:   SPINAL SEGMENTAL CONCLUSIONS:       Assessment & Plan   CLINICAL IMPRESSIONS  Medical Diagnosis: Lumbar strain    Treatment Diagnosis: Lumbar strain   Impression/Assessment: Patient is a 54 year old male with low back complaints.  The following significant findings have been identified: Pain, Decreased ROM/flexibility, and Decreased activity tolerance. These impairments interfere with their ability to perform recreational activities and household chores as compared to previous level of function.     Clinical Decision Making (Complexity):  Clinical Presentation: Evolving/Changing  Clinical Presentation Rationale: based on medical and personal factors listed in PT evaluation  Clinical Decision Making (Complexity): Moderate complexity    PLAN OF CARE  Treatment Interventions:  Modalities: Ultrasound  Interventions: Therapeutic Exercise    Long Term Goals     PT Goal 1  Goal Identifier: bending  Goal Description: Gagan will be able to bend back for hands to reach below knees without pain  Rationale: to maximize safety and independence with performance of ADLs and functional tasks;to maximize safety and independence with self cares  Target Date: 07/05/24      Frequency of Treatment: 1 x per week  Duration of Treatment: 6 weeks    Recommended Referrals to Other Professionals:   Education Assessment:        Risks and benefits of evaluation/treatment have been explained.   Patient/Family/caregiver agrees with Plan of Care.     Evaluation Time:     PT Eval, Moderate Complexity Minutes (46453): 15       Signing Clinician: Lorena Richard, PT

## 2024-06-07 ENCOUNTER — THERAPY VISIT (OUTPATIENT)
Dept: PHYSICAL THERAPY | Facility: CLINIC | Age: 54
End: 2024-06-07
Payer: OTHER MISCELLANEOUS

## 2024-06-07 DIAGNOSIS — S39.012D STRAIN OF LUMBAR REGION, SUBSEQUENT ENCOUNTER: Primary | ICD-10-CM

## 2024-06-07 PROCEDURE — 97110 THERAPEUTIC EXERCISES: CPT | Mod: GP | Performed by: PHYSICAL THERAPIST

## 2024-06-14 ENCOUNTER — OFFICE VISIT (OUTPATIENT)
Dept: FAMILY MEDICINE | Facility: CLINIC | Age: 54
End: 2024-06-14
Payer: OTHER MISCELLANEOUS

## 2024-06-14 VITALS
RESPIRATION RATE: 16 BRPM | DIASTOLIC BLOOD PRESSURE: 90 MMHG | HEIGHT: 76 IN | BODY MASS INDEX: 35.31 KG/M2 | HEART RATE: 77 BPM | WEIGHT: 290 LBS | SYSTOLIC BLOOD PRESSURE: 135 MMHG | TEMPERATURE: 98.1 F | OXYGEN SATURATION: 98 %

## 2024-06-14 DIAGNOSIS — Z98.890 H/O LUMBOSACRAL SPINE SURGERY: ICD-10-CM

## 2024-06-14 DIAGNOSIS — S39.012D STRAIN OF LUMBAR REGION, SUBSEQUENT ENCOUNTER: Primary | ICD-10-CM

## 2024-06-14 PROCEDURE — 99213 OFFICE O/P EST LOW 20 MIN: CPT | Performed by: PHYSICIAN ASSISTANT

## 2024-06-14 NOTE — LETTER
[facility]  [address] [address]  Phone: [phone]      REPORT OF WORK ABILITY  Employee Name:   Morris Christopher        : 1970     #: [id number]     Work related injury: Yes  Claim number: [id number]   Employed elsewhere? No     Today's date: 2024  Date of injury: 2024  Date of first visit: 2024     PROVIDER EVALUATION:      1. Diagnosis: Strain of lumbar region; h/o lumbosacral spine surgery 2023     2. Treatment: Rest, work restrictions to avoid further injury, over the counter NSAIDS/tylenol, muscle relaxers prn, physical therapy  3. Medication:  tylenol and/or ibuprofen prn     4. No work from 2024 (initial visit) to 2024.  5. Return to work date: 2024 with restrictions. Visits every month to update restrictions. 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  - Allow for frequent change in positions - no sitting or standing for longer than 30 min at a time without being able to change position  - No lifting above 20 lb from below waist level (that would require bending)  - No static push/pull greater than 50 lbs    ACTIVITIES he IS ABLE to do:   - Able to do IV starts as he can adjust the bed so he can stand  - Able to help pass medications  - Able to run items on floor as needed  - Able to set up isolation rooms  - Able to stock shelves as long as no bending/twisting at waist  - Able to push/pull patients in wheelchairs  - Able to push patients in beds as long as beds have the automatic drive   - can care for non critical patients and those that are NOT a known fall risk  - Can do 1:1 sitting as long as it is a non behavioral/ non aggressive patient          DURATION OF LIMITATIONS: An additional 2 weeks. So current restrictions and abilities through 24        If work restrictions is not available, the employee is totally disabled  Next appointment: 2 weeks

## 2024-06-14 NOTE — PROGRESS NOTES
Assessment & Plan       ICD-10-CM    1. Strain of lumbar region, subsequent encounter  S39.012D Physical Therapy  Referral      2. H/O lumbosacral spine surgery  Z98.890         Improving.   Doing PT exercises on his own at home  Discussed extension of restrictions and re evaluation in 2 weeks versus 4 weeks so that if we can continue to expand work functions we can do so as patient is eager to get back to work/normal duties        Subjective   Gagan is a 54 year old, presenting for the following health issues:  Back Pain        6/14/2024     1:44 PM   Additional Questions   Roomed by Shelly     History of Present Illness       Back Pain:  He presents for follow up of back pain. Patient's back pain is a chronic problem.  Location of back pain:  Right lower back and left lower back  Description of back pain: cramping and dull ache  Back pain spreads: nowhere    Since patient first noticed back pain, pain is: gradually improving  Does back pain interfere with his job:  Yes       He eats 2-3 servings of fruits and vegetables daily.He consumes 0 sweetened beverage(s) daily.He exercises with enough effort to increase his heart rate 9 or less minutes per day.  He exercises with enough effort to increase his heart rate 3 or less days per week. He is missing 3 dose(s) of medications per week.       Still getting spasms but feels they are more protective -  like if he is acting 'quickly' his back reminds him that he can't move that fast  Typically if he rests or stops what he is doing for a few minutes it does get better    At work he has been able to follow the restrictions and is actually doing less than what his restrictions even say he could do  He feels he can do more  At home he has been trying to do a little more without pushing too hard    He did mow the lawn the other day - took him 2 days as he spent one day doing the front and the next the back  Felt okay after doing that    Canceled the last 2 PT  "exercises because they have been giving him the same exercises he was already doing at home    Looking at extending the restrictions -   Discussed with QRS provider with him today (Jelena Bueno)    Would be able to push patients in wheelchairs  Could push patients in beds as long as beds have the automatic drive (not all manual)  Could care for non critical patients - bedside care for patients that are not a known fall risk   Can do the 1:1 (sitting) as long as a nonbehavioral/not aggressive patient  Can lift up to 20lbs  No static push/pull greater than 50lb        Review of Systems  Remainder of ROS obtained and found to be negative other than that which was documented above        Objective    BP (!) 135/90   Pulse 77   Temp 98.1  F (36.7  C) (Tympanic)   Resp 16   Ht 1.93 m (6' 3.98\")   Wt 131.5 kg (290 lb)   SpO2 98%   BMI 35.31 kg/m    Body mass index is 35.31 kg/m .  Physical Exam   GENERAL: alert and no distress  PSYCH: mentation appears normal, affect normal/bright            Signed Electronically by: Salima Zhang PA-C    "

## 2024-06-26 ENCOUNTER — MYC MEDICAL ADVICE (OUTPATIENT)
Dept: FAMILY MEDICINE | Facility: CLINIC | Age: 54
End: 2024-06-26

## 2024-06-26 DIAGNOSIS — E11.9 TYPE 2 DIABETES MELLITUS WITHOUT COMPLICATION, WITHOUT LONG-TERM CURRENT USE OF INSULIN (H): ICD-10-CM

## 2024-06-26 DIAGNOSIS — Z91.030 H/O BEE STING ALLERGY: Primary | ICD-10-CM

## 2024-06-27 RX ORDER — EPINEPHRINE 0.3 MG/.3ML
0.3 INJECTION SUBCUTANEOUS PRN
Qty: 2 EACH | Refills: 3 | Status: SHIPPED | OUTPATIENT
Start: 2024-06-27

## 2024-06-28 ENCOUNTER — VIRTUAL VISIT (OUTPATIENT)
Dept: FAMILY MEDICINE | Facility: CLINIC | Age: 54
End: 2024-06-28
Payer: OTHER MISCELLANEOUS

## 2024-06-28 ENCOUNTER — LAB (OUTPATIENT)
Dept: LAB | Facility: HOSPITAL | Age: 54
End: 2024-06-28
Payer: COMMERCIAL

## 2024-06-28 DIAGNOSIS — E11.9 TYPE 2 DIABETES MELLITUS WITHOUT COMPLICATION, WITHOUT LONG-TERM CURRENT USE OF INSULIN (H): ICD-10-CM

## 2024-06-28 DIAGNOSIS — S39.012D STRAIN OF LUMBAR REGION, SUBSEQUENT ENCOUNTER: Primary | ICD-10-CM

## 2024-06-28 DIAGNOSIS — Z98.890 H/O LUMBOSACRAL SPINE SURGERY: ICD-10-CM

## 2024-06-28 LAB
ALBUMIN SERPL BCG-MCNC: 4.4 G/DL (ref 3.5–5.2)
ALP SERPL-CCNC: 82 U/L (ref 40–150)
ALT SERPL W P-5'-P-CCNC: 22 U/L (ref 0–70)
ANION GAP SERPL CALCULATED.3IONS-SCNC: 11 MMOL/L (ref 7–15)
AST SERPL W P-5'-P-CCNC: 15 U/L (ref 0–45)
BASOPHILS # BLD AUTO: 0.1 10E3/UL (ref 0–0.2)
BASOPHILS NFR BLD AUTO: 1 %
BILIRUB SERPL-MCNC: 0.8 MG/DL
BUN SERPL-MCNC: 15.2 MG/DL (ref 6–20)
CALCIUM SERPL-MCNC: 9.2 MG/DL (ref 8.6–10)
CHLORIDE SERPL-SCNC: 98 MMOL/L (ref 98–107)
CREAT SERPL-MCNC: 1.19 MG/DL (ref 0.67–1.17)
DEPRECATED HCO3 PLAS-SCNC: 30 MMOL/L (ref 22–29)
EGFRCR SERPLBLD CKD-EPI 2021: 72 ML/MIN/1.73M2
EOSINOPHIL # BLD AUTO: 0.2 10E3/UL (ref 0–0.7)
EOSINOPHIL NFR BLD AUTO: 3 %
ERYTHROCYTE [DISTWIDTH] IN BLOOD BY AUTOMATED COUNT: 12.9 % (ref 10–15)
FERRITIN SERPL-MCNC: 789 NG/ML (ref 31–409)
GLUCOSE SERPL-MCNC: 124 MG/DL (ref 70–99)
HBA1C MFR BLD: 7.1 %
HCT VFR BLD AUTO: 50 % (ref 40–53)
HGB BLD-MCNC: 17.3 G/DL (ref 13.3–17.7)
IMM GRANULOCYTES # BLD: 0.1 10E3/UL
IMM GRANULOCYTES NFR BLD: 1 %
LYMPHOCYTES # BLD AUTO: 2.9 10E3/UL (ref 0.8–5.3)
LYMPHOCYTES NFR BLD AUTO: 31 %
MCH RBC QN AUTO: 31.5 PG (ref 26.5–33)
MCHC RBC AUTO-ENTMCNC: 34.6 G/DL (ref 31.5–36.5)
MCV RBC AUTO: 91 FL (ref 78–100)
MONOCYTES # BLD AUTO: 0.9 10E3/UL (ref 0–1.3)
MONOCYTES NFR BLD AUTO: 10 %
NEUTROPHILS # BLD AUTO: 5.3 10E3/UL (ref 1.6–8.3)
NEUTROPHILS NFR BLD AUTO: 56 %
NRBC # BLD AUTO: 0 10E3/UL
NRBC BLD AUTO-RTO: 0 /100
PLATELET # BLD AUTO: 278 10E3/UL (ref 150–450)
POTASSIUM SERPL-SCNC: 3.9 MMOL/L (ref 3.4–5.3)
PROT SERPL-MCNC: 7.6 G/DL (ref 6.4–8.3)
RBC # BLD AUTO: 5.5 10E6/UL (ref 4.4–5.9)
SODIUM SERPL-SCNC: 139 MMOL/L (ref 135–145)
WBC # BLD AUTO: 9.5 10E3/UL (ref 4–11)

## 2024-06-28 PROCEDURE — 82728 ASSAY OF FERRITIN: CPT

## 2024-06-28 PROCEDURE — 99213 OFFICE O/P EST LOW 20 MIN: CPT | Mod: 95 | Performed by: PHYSICIAN ASSISTANT

## 2024-06-28 PROCEDURE — 85025 COMPLETE CBC W/AUTO DIFF WBC: CPT

## 2024-06-28 PROCEDURE — 80053 COMPREHEN METABOLIC PANEL: CPT

## 2024-06-28 PROCEDURE — 83036 HEMOGLOBIN GLYCOSYLATED A1C: CPT

## 2024-06-28 PROCEDURE — 36415 COLL VENOUS BLD VENIPUNCTURE: CPT

## 2024-06-28 NOTE — PROGRESS NOTES
"Gagan is a 54 year old who is being evaluated via a billable video visit.    How would you like to obtain your AVS? MyChart  If the video visit is dropped, the invitation should be resent by: Text to cell phone: [phone]  Will anyone else be joining your video visit? No    Assessment & Plan       ICD-10-CM    1. Strain of lumbar region, subsequent encounter  S39.012D       2. H/O lumbosacral spine surgery  Z98.890         Updated work letter completed and sent to patient via my chart  No change in work restrictions  Has appointment with PT through work program as suggested by QRS provider last visit on Monday    Subjective   Gagan is a 54 year old, presenting for the following health issues:  RECHECK      6/28/2024    11:19 AM   Additional Questions   Roomed by KATELYNN Vega     History of Present Illness       Back Pain:  He presents for follow up of back pain. Patient's back pain is a chronic problem.  Location of back pain:  Right lower back and left lower back  Description of back pain: cramping and dull ache  Back pain spreads: nowhere    Since patient first noticed back pain, pain is: gradually improving  Does back pain interfere with his job:  Yes       He eats 2-3 servings of fruits and vegetables daily.He consumes 0 sweetened beverage(s) daily.He exercises with enough effort to increase his heart rate 9 or less minutes per day.  He exercises with enough effort to increase his heart rate 3 or less days per week. He is missing 3 dose(s) of medications per week.     -He is following up with work comp and his back pain. It has been staying the same. He may need his work comp forms updated.    Had an appointment for the new back program but had to cancel - rescheduled for Monday   Excited about the program to work on the strengthening     Everything is about the same  Did push some patient's around  Tried to help out moving a patient in bed (rolling them) and back immediately \"seized up\"       Review of " Systems  Remainder of ROS obtained and found to be negative other than that which was documented above        Objective           Vitals:  No vitals were obtained today due to virtual visit.    Physical Exam   GENERAL: alert and no distress  EYES: Eyes grossly normal to inspection.  No discharge or erythema, or obvious scleral/conjunctival abnormalities.  RESP: No audible wheeze, cough, or visible cyanosis.    SKIN: Visible skin clear. No significant rash, abnormal pigmentation or lesions.  NEURO: Cranial nerves grossly intact.  Mentation and speech appropriate for age.  PSYCH: Appropriate affect, tone, and pace of words          Video-Visit Details    Type of service:  Video Visit   Originating Location (pt. Location): Home    Distant Location (provider location):  On-site  Platform used for Video Visit: Jorge  Signed Electronically by: Salima Zhang PA-C

## 2024-06-28 NOTE — LETTER
[facility]  [address] [facility] [address] [address]  Phone: [phone]      REPORT OF WORK ABILITY    Employee Name:   Morris Christopher        : 1970     #: [id number]     Work related injury: Yes  Claim number: [id number]   Employed elsewhere? No     Today's date: 2024  Date of injury: 2024  Date of first visit: 2024     PROVIDER EVALUATION:      1. Diagnosis: Strain of lumbar region; h/o lumbosacral spine surgery 2023     2. Treatment: Rest, work restrictions to avoid further injury, over the counter NSAIDS/tylenol, muscle relaxers prn, physical therapy  3. Medication:  tylenol and/or ibuprofen prn     4. No work from 2024 (initial visit) to 2024.  5. Return to work date: 2024 with restrictions. Visits every 2-4 weeks to update restrictions.     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  - Allow for frequent change in positions - no sitting or standing for longer than 30 min at a time without being able to change position  - No lifting above 20 lb from below waist level (that would require bending)  - No static push/pull greater than 50 lbs    ACTIVITIES he IS ABLE to do:   - Able to do IV starts as he can adjust the bed so he can stand  - Able to help pass medications  - Able to run items on floor as needed  - Able to set up isolation rooms  - Able to stock shelves as long as no bending/twisting at waist  - Able to push/pull patients in wheelchairs  - Able to push patients in beds as long as beds have the automatic drive   - can care for non critical patients and those that are NOT a known fall risk  - Can do 1:1 sitting as long as it is a non behavioral/ non aggressive patient          DURATION OF LIMITATIONS: An additional 2 weeks. So current restrictions and abilities through 24        If work restrictions is not available, the employee is totally disabled  Next appointment: 2  weeks

## 2024-07-08 DIAGNOSIS — E11.9 TYPE 2 DIABETES MELLITUS WITHOUT COMPLICATION, WITHOUT LONG-TERM CURRENT USE OF INSULIN (H): ICD-10-CM

## 2024-07-08 DIAGNOSIS — E11.42 DIABETIC POLYNEUROPATHY ASSOCIATED WITH TYPE 2 DIABETES MELLITUS (H): ICD-10-CM

## 2024-07-09 ENCOUNTER — VIRTUAL VISIT (OUTPATIENT)
Dept: FAMILY MEDICINE | Facility: CLINIC | Age: 54
End: 2024-07-09
Payer: COMMERCIAL

## 2024-07-09 DIAGNOSIS — S39.012D STRAIN OF LUMBAR REGION, SUBSEQUENT ENCOUNTER: Primary | ICD-10-CM

## 2024-07-09 PROCEDURE — 99214 OFFICE O/P EST MOD 30 MIN: CPT | Mod: 95 | Performed by: PHYSICIAN ASSISTANT

## 2024-07-09 RX ORDER — GLIPIZIDE 10 MG/1
10 TABLET ORAL
Qty: 180 TABLET | Refills: 0 | Status: SHIPPED | OUTPATIENT
Start: 2024-07-09

## 2024-07-09 NOTE — LETTER
[facility]  [address] [address]  Phone: [phone]      REPORT OF WORK ABILITY    Employee Name:   Morris Christopher        : 1970     #: [id number]     Work related injury: Yes  Claim number: [id number]   Employed elsewhere? No     Today's date: 2024  Date of injury: 2024  Date of first visit: 2024     PROVIDER EVALUATION:      1. Diagnosis: Strain of lumbar region; h/o lumbosacral spine surgery 2023     2. Treatment: Work restrictions to avoid further injury or delayed healing, over the counter medications as needed such as NSAIDS/tylenol, physical therapy - currently 2x/week  3. Medication:  tylenol and/or ibuprofen prn     4. No work from 2024 (initial visit) to 2024.  5. Return to work date: 2024 with restrictions. Visits every 2-4 weeks to update restrictions.     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  - Allow for frequent change in positions - no sitting or standing for longer than 30 min at a time without being able to change position  - No lifting above 20 lb from below waist level (that would require bending)  - No static push/pull greater than 50 lbs    ACTIVITIES he IS ABLE to do:   - Able to do IV starts as he can adjust the bed so he can stand  - Able to help pass medications  - Able to run items on floor as needed  - Able to set up isolation rooms  - Able to stock shelves as long as no bending/twisting at waist  - Able to push/pull patients in wheelchairs  - Able to push patients in beds as long as beds have the automatic drive   - can care for non critical patients and those that are NOT a known fall risk  - Can do 1:1 sitting as long as it is a non behavioral/ non aggressive patient          DURATION OF LIMITATIONS: Continuing for an additional 4 weeks from previous noted end date of 24. So current restrictions and abilities through 24         If work restrictions is not available, the employee is totally disabled  Next appointment: 8/9/24

## 2024-07-09 NOTE — PROGRESS NOTES
"Gagan is a 54 year old who is being evaluated via a billable video visit.    How would you like to obtain your AVS? MyChart  If the video visit is dropped, the invitation should be resent by: Text to cell phone: [phone]  Will anyone else be joining your video visit? Yes: Jelena from Work Comp. How would they like to receive their invitation? Text to cell phone: [phone]    Assessment & Plan       ICD-10-CM    1. Strain of lumbar region, subsequent encounter  S39.012D         Updated work note - continue current restrictions for another 4 weeks as he continues to get established in therapy      32 minutes spent in chart review, patient visit, documentation and completion of workability letter  Copy of letter sent to patient via my chart  Copy was also faxed to QRS person (Jelena)    BMI  Estimated body mass index is 35.31 kg/m  as calculated from the following:    Height as of 6/14/24: 1.93 m (6' 3.98\").    Weight as of 6/14/24: 131.5 kg (290 lb).     Subjective   Gagan is a 54 year old, presenting for the following health issues:  Work Comp      7/9/2024     3:06 PM   Additional Questions   Roomed by KATELYNN Vega     HPI       Patient is following up with an ongoing work comp injury.     Likes the new program   They are pushing him which is good - a lot more stiffness because they are pushing him in terms of ROM  ONly \"pain\" is been when he tries to go beyond the ROM    In terms of work, they have focused on proper technique    They (PT) has a plan in place with a goal weight - he has a lot to work to do to get there  Having most issues with the twisting movement the most    In terms of ROM - because the equipment is helping him, he is able to bend forward more with the machine than on his own    Currently going 2x/week with at least a day break in between visits  12 week program - they may increase frequency    Struggling at work because there is not a lot he can do  He is going from floor to floor to see if " anyone needs help but often sitting waiting for someone to need him to start an IV    Was getting him paid through a separate program for the light duty -ends on the 12th   Not sure what is going to happen after that     Jelena -   Fax [phone]      Review of Systems  Remainder of ROS obtained and found to be negative other than that which was documented above        Objective           Vitals:  No vitals were obtained today due to virtual visit.    Physical Exam   GENERAL: alert and no distress  EYES: Eyes grossly normal to inspection.  No discharge or erythema, or obvious scleral/conjunctival abnormalities.  RESP: No audible wheeze, cough, or visible cyanosis.    SKIN: Visible skin clear. No significant rash, abnormal pigmentation or lesions.  NEURO: Cranial nerves grossly intact.  Mentation and speech appropriate for age.  PSYCH: Appropriate affect, tone, and pace of words          Video-Visit Details    Type of service:  Video Visit   Originating Location (pt. Location): [place]    Distant Location (provider location):  On-site  Platform used for Video Visit: Jorge  Signed Electronically by: Salima Zhang PA-C    Answers submitted by the patient for this visit:  Patient Health Questionnaire (Submitted on 7/9/2024)  If you checked off any problems, how difficult have these problems made it for you to do your work, take care of things at home, or get along with other people?: Somewhat difficult  PHQ9 TOTAL SCORE: 6

## 2024-07-09 NOTE — LETTER
[facility]  [address] [address]  Phone: [phone]      REPORT OF WORK ABILITY    Employee Name:   Morris Christopher        : 1970     #: [id number]     Work related injury: Yes  Claim number: [id number]   Employed elsewhere? No     Today's date: 2024  Date of injury: 2024  Date of first visit: 2024     PROVIDER EVALUATION:      1. Diagnosis: Strain of lumbar region; h/o lumbosacral spine surgery 2023     2. Treatment: Work restrictions to avoid further injury or delayed healing, over the counter medications as needed such as NSAIDS/tylenol, physical therapy - currently 2x/week  3. Medication:  tylenol and/or ibuprofen prn     4. No work from 2024 (initial visit) to 2024.  5. Return to work date: 2024 with restrictions. Visits every 2-4 weeks to update restrictions.     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  - Allow for frequent change in positions - no sitting or standing for longer than 30 min at a time without being able to change position  - No lifting above 20 lb from below waist level (that would require bending)  - No static push/pull greater than 50 lbs    ACTIVITIES he IS ABLE to do:   - Able to do IV starts as he can adjust the bed so he can stand  - Able to help pass medications  - Able to run items on floor as needed  - Able to set up isolation rooms  - Able to stock shelves as long as no bending/twisting at waist  - Able to push/pull patients in wheelchairs  - Able to push patients in beds as long as beds have the automatic drive   - can care for non critical patients and those that are NOT a known fall risk  - Can do 1:1 sitting as long as it is a non behavioral/ non aggressive patient          DURATION OF LIMITATIONS: Continuing for an additional 4 weeks from previous noted end date of 24. So current restrictions and abilities through 24         If work restrictions is not available, the employee is totally disabled  Next appointment: 8/9/24      Salima Zhang PA-C  [facility]

## 2024-07-31 ENCOUNTER — MYC MEDICAL ADVICE (OUTPATIENT)
Dept: FAMILY MEDICINE | Facility: CLINIC | Age: 54
End: 2024-07-31
Payer: COMMERCIAL

## 2024-08-04 DIAGNOSIS — E11.65 TYPE 2 DIABETES MELLITUS WITH HYPERGLYCEMIA, WITHOUT LONG-TERM CURRENT USE OF INSULIN (H): ICD-10-CM

## 2024-08-05 RX ORDER — DULAGLUTIDE 3 MG/.5ML
INJECTION, SOLUTION SUBCUTANEOUS
Qty: 2 ML | Refills: 2 | Status: SHIPPED | OUTPATIENT
Start: 2024-08-05

## 2024-08-05 NOTE — TELEPHONE ENCOUNTER
GFR Estimate   Date Value Ref Range Status   06/28/2024 72 >60 mL/min/1.73m2 Final     Comment:     eGFR calculated using 2021 CKD-EPI equation.   03/05/2021 >60 >60 mL/min/1.73m2 Final

## 2024-08-19 DIAGNOSIS — E29.1 HYPOGONADISM MALE: ICD-10-CM

## 2024-08-20 RX ORDER — TESTOSTERONE CYPIONATE 200 MG/ML
180 INJECTION, SOLUTION INTRAMUSCULAR
Qty: 2 ML | Refills: 5 | Status: SHIPPED | OUTPATIENT
Start: 2024-08-20

## 2024-08-26 DIAGNOSIS — F32.0 MILD MAJOR DEPRESSION (H): ICD-10-CM

## 2024-08-27 RX ORDER — CITALOPRAM HYDROBROMIDE 20 MG/1
20 TABLET ORAL DAILY
Qty: 90 TABLET | Refills: 0 | Status: SHIPPED | OUTPATIENT
Start: 2024-08-27

## 2024-09-06 NOTE — PROGRESS NOTES
06/07/24 0500   Appointment Info   Signing clinician's name / credentials Charity Shaver PT DPT   Total/Authorized Visits [email or web address]   Visits Used 2   Medical Diagnosis Lumbar strain   PT Tx Diagnosis Lumbar strain   Other pertinent information Hx of spinal fusion, but injury is WC   Progress Note/Certification   Onset of illness/injury or Date of Surgery 04/06/24   Therapy Frequency 1 x per week   Predicted Duration 6 weeks   PT Goal 1   Goal Identifier bending   Goal Description Gagan will be able to bend back for hands to reach below knees without pain   Rationale to maximize safety and independence with performance of ADLs and functional tasks;to maximize safety and independence with self cares   Target Date 07/05/24   Subjective Report   Subjective Report Gagan reports that exertion still tends to back tightening up, spasms. Cannot stand or sit for too long. Has been trying to walk a mile and increase pace but starts to tightne up with this and even into throacic spine. Still cant make it one mile without this happening. No radiating symptoms down legs. Light duty at work.   Objective Measures   Objective Measures Objective Measure 1   Objective Measure 1   Objective Measure Lumbar ROM   Details flexion to inferior pole patella, extension w/ moderate limitation   Therapeutic Procedure/Exercise   Therapeutic Procedures: strength, endurance, ROM, flexibility minutes (94070) 35   PTRx Ther Proc 1 Posterior Pelvic Tilt   PTRx Ther Proc 1 - Details VC TC. cueing to limit valsalva   PTRx Ther Proc 2 Single Knee to Chest   PTRx Ther Proc 2 - Details 2 x 15 seconds comfortable stretch   PTRx Ther Proc 3 Bridging #1   PTRx Ther Proc 3 - Details x 5 mini range   PTRx Ther Proc 4 Pretzel Stretch   PTRx Ther Proc 4 - Details HEP   PTRx Ther Proc 5 Standing Hamstring Stretch   PTRx Ther Proc 5 - Details foot on stool, x 20s holds   PTRx Ther Proc 6 All 4s Cat Cow   PTRx Ther Proc 6 - Details not today   Skilled Intervention HEP,  guided stretching and strengthening   Patient Response/Progress additional strength added today w/ good tolerance   PTRx Ther Proc 7 Supine Abdominal Exercise #3 (Marching)   PTRx Ther Proc 7 - Details TrA engagement add march. x 10. cueing breathing neutral spine.    PTRx Ther Proc 8 Clamshell Feet together   PTRx Ther Proc 8 - Details x 10 reps fatigues appropriately   PTRx Ther Proc 9 Hip Abduction Straight Leg Raise   PTRx Ther Proc 9 - Details x 10 fatigues   PTRx Ther Proc 10 Shoulder Theraband Extension   PTRx Ther Proc 10 - Details blue TB   Neuromuscular Re-education   PTRx Neuro Re-ed 1 Abdominal Brace Transverse Abdominis   PTRx Neuro Re-ed 1 - Details 5 second holds x 10.    PTRx Neuro Re-ed 2 Pallof Press   PTRx Neuro Re-ed 2 - Details blue TB   Plan   Home program printed ptrx   Plan for next session progress lumbar strength, LE and lumbarflexibility   Total Session Time   Timed Code Treatment Minutes 35   Total Treatment Time (sum of timed and untimed services) 35         DISCHARGE  Reason for Discharge: Patient has failed to schedule further appointments.    Equipment Issued: na    Discharge Plan: Patient to continue home program.    Referring Provider:  Salima Zhang

## 2024-09-13 ENCOUNTER — TELEPHONE (OUTPATIENT)
Dept: FAMILY MEDICINE | Facility: CLINIC | Age: 54
End: 2024-09-13

## 2024-09-23 ENCOUNTER — E-VISIT (OUTPATIENT)
Dept: FAMILY MEDICINE | Facility: CLINIC | Age: 54
End: 2024-09-23
Payer: COMMERCIAL

## 2024-09-23 DIAGNOSIS — S39.012A LOW BACK STRAIN, INITIAL ENCOUNTER: Primary | ICD-10-CM

## 2024-09-23 PROCEDURE — 99422 OL DIG E/M SVC 11-20 MIN: CPT | Performed by: PHYSICIAN ASSISTANT

## 2024-09-23 NOTE — LETTER
September 24, 2024      Morris Christopher  [address] [address]        To Whom It May Concern:    Morris Christopher had a work related injury/event during his evening shift on 9/20/24. He was unable to work on Sunday, 9/22/24. He is able to return to work on Tuesday, 9/24/24        Sincerely,        Salima Zhang PA-C

## 2024-09-23 NOTE — LETTER
September 24, 2024      Morris Christopher  [address] [address]        To Whom It May Concern:    Morris Christopher had a work related injury/event during his evening shift on 9/20/24. He was unable to work on Sunday, 9/22/24 and Tuesday 9/24/24. He is able to return to work on Wednesday 9/25/24        Sincerely,        Salima Zhang PA-C

## 2024-10-03 ENCOUNTER — TRANSFERRED RECORDS (OUTPATIENT)
Dept: HEALTH INFORMATION MANAGEMENT | Facility: CLINIC | Age: 54
End: 2024-10-03
Payer: COMMERCIAL

## 2024-10-05 DIAGNOSIS — E11.42 DIABETIC POLYNEUROPATHY ASSOCIATED WITH TYPE 2 DIABETES MELLITUS (H): ICD-10-CM

## 2024-10-05 DIAGNOSIS — E11.9 TYPE 2 DIABETES MELLITUS WITHOUT COMPLICATION, WITHOUT LONG-TERM CURRENT USE OF INSULIN (H): ICD-10-CM

## 2024-10-08 NOTE — TELEPHONE ENCOUNTER
Requested Prescriptions   Pending Prescriptions Disp Refills    glipiZIDE (GLUCOTROL) 10 MG tablet [Pharmacy Med Name: GLIPIZIDE 10MG TABS] 180 tablet 0     Sig: TAKE 1 TABLET (10 MG) BY MOUTH 2 TIMES DAILY (BEFORE MEALS)       Sulfonylurea Agents Failed - 10/5/2024 12:14 PM        Failed - Patient has a recent creatinine (normal) within the past 12 mos.     Recent Labs   Lab Test 06/28/24  0921   CR 1.19*                 Passed - Patient has documented A1c within the specified period of time.     If HgbA1C is 8 or greater, it needs to be on file within the past 3 months.  If less than 8, must be on file within the past 6 months.     Recent Labs   Lab Test 06/28/24  0921   A1C 7.1*             Passed - Medication is active on med list        Passed - Has GFR on file in past 12 months and most recent value is normal        Passed - Recent (6 mo) or future (90 days) visit within the authorizing provider's specialty     The patient must have completed an in-person or virtual visit within the past 6 months or has a future visit scheduled within the next 90 days with the authorizing provider s specialty.  Urgent care and e-visits do not quality as an office visit for this protocol.          Passed - Medication indicated for associated diagnosis     Medication is associated with one or more of the following diagnoses:  Maturity-onset diabetes of the young   Peripheral circulatory disorder due to type 2 diabetes mellitus   Type 2 diabetes mellitus           Passed - Patient is age 18 or older

## 2024-10-09 RX ORDER — GLIPIZIDE 10 MG/1
10 TABLET ORAL
Qty: 180 TABLET | Refills: 0 | Status: SHIPPED | OUTPATIENT
Start: 2024-10-09

## 2024-11-26 DIAGNOSIS — F32.0 MILD MAJOR DEPRESSION (H): ICD-10-CM

## 2024-11-26 DIAGNOSIS — E29.1 HYPOGONADISM MALE: ICD-10-CM

## 2024-11-26 RX ORDER — SYRINGE WITH NEEDLE, 1 ML 25GX5/8"
SYRINGE, EMPTY DISPOSABLE MISCELLANEOUS
Qty: 6 EACH | Refills: 3 | Status: CANCELLED | OUTPATIENT
Start: 2024-11-26

## 2024-11-26 RX ORDER — NEEDLES, DISPOSABLE 25GX5/8"
NEEDLE, DISPOSABLE MISCELLANEOUS
Qty: 6 EACH | Refills: 3 | Status: SHIPPED | OUTPATIENT
Start: 2024-11-26

## 2024-11-26 RX ORDER — CITALOPRAM HYDROBROMIDE 20 MG/1
20 TABLET ORAL DAILY
Qty: 90 TABLET | Refills: 3 | Status: SHIPPED | OUTPATIENT
Start: 2024-11-26

## 2024-11-26 NOTE — TELEPHONE ENCOUNTER
"Has appointment scheduled for 1/20/25.      Requested Prescriptions   Pending Prescriptions Disp Refills    B-D LUER-ASIYA SYRINGE 18G X 1-1/2\" 3 ML MISC [Pharmacy Med Name: BD LUER-ASIYA SYRIN 18G X 1-1/2\" MISC] 6 each 3     Sig: USE ONE SYRINGE EVERY 14 DAYS       There is no refill protocol information for this order       BD HYPODERMIC NEEDLE 25G X 1-1/2\" MISC [Pharmacy Med Name: BD HYPODERMIC NEE 25G X 1-1/2\" MISC]  3     Sig: USE ONE NEEDLE EVERY 14 DAYS       There is no refill protocol information for this order       citalopram (CELEXA) 20 MG tablet [Pharmacy Med Name: CITALOPRAM HYDROBROMIDE 20MG TABS] 90 tablet 0     Sig: TAKE 1 TABLET (20 MG) BY MOUTH DAILY.       SSRIs Protocol Failed - 11/26/2024  7:53 AM        Failed - PHQ-9 score less than 5 in past 6 months     Please review last PHQ-9 score.           Passed - Medication is active on med list        Passed - Recent (12 mo) or future (90 days) visit within the authorizing provider's specialty     The patient must have completed an in-person or virtual visit within the past 12 months or has a future visit scheduled within the next 90 days with the authorizing provider s specialty.  Urgent care and e-visits do not qualify as an office visit for this protocol.          Passed - Medication indicated for associated diagnosis     Medication is associated with one or more of the following diagnoses:              Anxiety             Bipolar  Depression  Obsessive-compulsive disorder             Panic disorder  Postmenopausal flushing             Premenstrual dysphoric disorder             Social phobia   Adjustment disorder with depressed mood   Mood disorder   Adjustment disorder with anxious mood          Passed - Patient is age 18 or older             "

## 2024-12-03 ENCOUNTER — TELEPHONE (OUTPATIENT)
Dept: FAMILY MEDICINE | Facility: CLINIC | Age: 54
End: 2024-12-03
Payer: COMMERCIAL

## 2024-12-03 NOTE — TELEPHONE ENCOUNTER
Amisha from [employer] called to check on the status of the forms for work comp.  They need to know if patient has reached MMI and if he has PPD.    Radha Houston, CMA

## 2024-12-06 NOTE — TELEPHONE ENCOUNTER
Patient called and states he will call back with the exact date.    See 12/03 Dvaid:  Gagan,   Just to follow up on the my chart from 10/23. I received a call again today regarding forms about reaching MMI ( maximum improvement) and being back to work. Can you remind me again the day you were back to work full time without restrictions     [facility]

## 2024-12-06 NOTE — CONFIDENTIAL NOTE
I have sent patient  messages re: his work comp issue as they are waiting on forms to document medical improvement. Please call patient as he has not answered x2 my chart messages sent and I would like to get this taken care of     Salima Zhang PA-C

## 2024-12-09 NOTE — TELEPHONE ENCOUNTER
[employer]ent[employer] message sent to patient in 12/3/2024 encounter  Closing duplicate telephone encounter    Kulwant Harvey, Clinic RN  [facility]

## 2024-12-30 DIAGNOSIS — E29.1 HYPOGONADISM MALE: ICD-10-CM

## 2024-12-30 DIAGNOSIS — M51.369 BULGING LUMBAR DISC: ICD-10-CM

## 2024-12-30 DIAGNOSIS — M62.830 BACK MUSCLE SPASM: ICD-10-CM

## 2024-12-30 RX ORDER — SYRINGE WITH NEEDLE, 1 ML 25GX5/8"
SYRINGE, EMPTY DISPOSABLE MISCELLANEOUS
Qty: 6 EACH | Refills: 3 | Status: CANCELLED | OUTPATIENT
Start: 2024-12-30

## 2024-12-30 RX ORDER — METHOCARBAMOL 500 MG/1
500 TABLET, FILM COATED ORAL 4 TIMES DAILY PRN
Qty: 60 TABLET | Refills: 3 | Status: SHIPPED | OUTPATIENT
Start: 2024-12-30

## 2025-01-09 ENCOUNTER — MYC MEDICAL ADVICE (OUTPATIENT)
Dept: FAMILY MEDICINE | Facility: CLINIC | Age: 55
End: 2025-01-09
Payer: COMMERCIAL

## 2025-01-09 DIAGNOSIS — E78.5 HYPERLIPIDEMIA LDL GOAL <70: ICD-10-CM

## 2025-01-09 DIAGNOSIS — E55.9 VITAMIN D DEFICIENCY: ICD-10-CM

## 2025-01-09 DIAGNOSIS — E11.9 TYPE 2 DIABETES MELLITUS WITHOUT COMPLICATION, WITHOUT LONG-TERM CURRENT USE OF INSULIN (H): Primary | ICD-10-CM

## 2025-01-09 DIAGNOSIS — Z12.5 SCREENING FOR PROSTATE CANCER: ICD-10-CM

## 2025-01-10 ENCOUNTER — HOSPITAL ENCOUNTER (EMERGENCY)
Facility: CLINIC | Age: 55
Discharge: LEFT BEFORE DISCHARGE | End: 2025-01-10
Admitting: EMERGENCY MEDICINE
Payer: COMMERCIAL

## 2025-01-10 VITALS
RESPIRATION RATE: 22 BRPM | DIASTOLIC BLOOD PRESSURE: 95 MMHG | BODY MASS INDEX: 36.53 KG/M2 | WEIGHT: 300 LBS | HEART RATE: 101 BPM | TEMPERATURE: 98.7 F | SYSTOLIC BLOOD PRESSURE: 139 MMHG | HEIGHT: 76 IN | OXYGEN SATURATION: 96 %

## 2025-01-10 PROCEDURE — 87637 SARSCOV2&INF A&B&RSV AMP PRB: CPT | Performed by: EMERGENCY MEDICINE

## 2025-01-10 PROCEDURE — 99281 EMR DPT VST MAYX REQ PHY/QHP: CPT

## 2025-01-10 ASSESSMENT — COLUMBIA-SUICIDE SEVERITY RATING SCALE - C-SSRS
6. HAVE YOU EVER DONE ANYTHING, STARTED TO DO ANYTHING, OR PREPARED TO DO ANYTHING TO END YOUR LIFE?: NO
2. HAVE YOU ACTUALLY HAD ANY THOUGHTS OF KILLING YOURSELF IN THE PAST MONTH?: NO
1. IN THE PAST MONTH, HAVE YOU WISHED YOU WERE DEAD OR WISHED YOU COULD GO TO SLEEP AND NOT WAKE UP?: NO

## 2025-01-11 LAB
FLUAV RNA SPEC QL NAA+PROBE: NEGATIVE
FLUBV RNA RESP QL NAA+PROBE: NEGATIVE
RSV RNA SPEC NAA+PROBE: NEGATIVE
SARS-COV-2 RNA RESP QL NAA+PROBE: NEGATIVE

## 2025-01-11 NOTE — ED TRIAGE NOTES
Patient presents to the ED wanting to be swabbed for COVID, Influenza, and RSV.  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  He states he has cough, fever, body aches, and fatigue.  Symptoms started this morning.  He has taken Tylenol and Zofran.  He does not want to be seen by the doctor.       Triage Assessment (Adult)       Row Name 01/10/25 7512          Triage Assessment    Airway WDL WDL        Respiratory WDL    Respiratory WDL X;cough     Cough Frequency frequent        Skin Circulation/Temperature WDL    Skin Circulation/Temperature WDL WDL        Cardiac WDL    Cardiac WDL WDL        Peripheral/Neurovascular WDL    Peripheral Neurovascular WDL WDL        Cognitive/Neuro/Behavioral WDL    Cognitive/Neuro/Behavioral WDL WDL

## 2025-01-11 NOTE — ED NOTES
Patient did not want a medical screen from the doctor.  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  Signed refusal of medical screen.

## 2025-01-18 ENCOUNTER — LAB (OUTPATIENT)
Dept: LAB | Facility: HOSPITAL | Age: 55
End: 2025-01-18
Payer: COMMERCIAL

## 2025-01-18 ENCOUNTER — MYC MEDICAL ADVICE (OUTPATIENT)
Dept: FAMILY MEDICINE | Facility: CLINIC | Age: 55
End: 2025-01-18

## 2025-01-18 DIAGNOSIS — E78.5 HYPERLIPIDEMIA LDL GOAL <70: ICD-10-CM

## 2025-01-18 DIAGNOSIS — E11.9 TYPE 2 DIABETES MELLITUS WITHOUT COMPLICATION, WITHOUT LONG-TERM CURRENT USE OF INSULIN (H): ICD-10-CM

## 2025-01-18 DIAGNOSIS — Z12.5 SCREENING FOR PROSTATE CANCER: ICD-10-CM

## 2025-01-18 DIAGNOSIS — E55.9 VITAMIN D DEFICIENCY: ICD-10-CM

## 2025-01-18 LAB
ALBUMIN SERPL BCG-MCNC: 4.4 G/DL (ref 3.5–5.2)
ALP SERPL-CCNC: 83 U/L (ref 40–150)
ALT SERPL W P-5'-P-CCNC: 29 U/L (ref 0–70)
ANION GAP SERPL CALCULATED.3IONS-SCNC: 11 MMOL/L (ref 7–15)
AST SERPL W P-5'-P-CCNC: 20 U/L (ref 0–45)
BASOPHILS # BLD AUTO: 0.1 10E3/UL (ref 0–0.2)
BASOPHILS NFR BLD AUTO: 1 %
BILIRUB SERPL-MCNC: 0.8 MG/DL
BUN SERPL-MCNC: 17.8 MG/DL (ref 6–20)
CALCIUM SERPL-MCNC: 9.1 MG/DL (ref 8.8–10.4)
CHLORIDE SERPL-SCNC: 101 MMOL/L (ref 98–107)
CHOLEST SERPL-MCNC: 179 MG/DL
CREAT SERPL-MCNC: 1.07 MG/DL (ref 0.67–1.17)
EGFRCR SERPLBLD CKD-EPI 2021: 81 ML/MIN/1.73M2
EOSINOPHIL # BLD AUTO: 0.2 10E3/UL (ref 0–0.7)
EOSINOPHIL NFR BLD AUTO: 2 %
ERYTHROCYTE [DISTWIDTH] IN BLOOD BY AUTOMATED COUNT: 11.6 % (ref 10–15)
EST. AVERAGE GLUCOSE BLD GHB EST-MCNC: 160 MG/DL
FASTING STATUS PATIENT QL REPORTED: NO
FASTING STATUS PATIENT QL REPORTED: NO
FERRITIN SERPL-MCNC: 637 NG/ML (ref 31–409)
GLUCOSE SERPL-MCNC: 99 MG/DL (ref 70–99)
HBA1C MFR BLD: 7.2 %
HCO3 SERPL-SCNC: 25 MMOL/L (ref 22–29)
HCT VFR BLD AUTO: 50.1 % (ref 40–53)
HDLC SERPL-MCNC: 48 MG/DL
HGB BLD-MCNC: 18.2 G/DL (ref 13.3–17.7)
IMM GRANULOCYTES # BLD: 0.1 10E3/UL
IMM GRANULOCYTES NFR BLD: 1 %
LDLC SERPL CALC-MCNC: 114 MG/DL
LYMPHOCYTES # BLD AUTO: 2.9 10E3/UL (ref 0.8–5.3)
LYMPHOCYTES NFR BLD AUTO: 32 %
MCH RBC QN AUTO: 32 PG (ref 26.5–33)
MCHC RBC AUTO-ENTMCNC: 36.3 G/DL (ref 31.5–36.5)
MCV RBC AUTO: 88 FL (ref 78–100)
MONOCYTES # BLD AUTO: 0.9 10E3/UL (ref 0–1.3)
MONOCYTES NFR BLD AUTO: 9 %
NEUTROPHILS # BLD AUTO: 5.2 10E3/UL (ref 1.6–8.3)
NEUTROPHILS NFR BLD AUTO: 56 %
NONHDLC SERPL-MCNC: 131 MG/DL
NRBC # BLD AUTO: 0 10E3/UL
NRBC BLD AUTO-RTO: 0 /100
PLATELET # BLD AUTO: 261 10E3/UL (ref 150–450)
POTASSIUM SERPL-SCNC: 3.6 MMOL/L (ref 3.4–5.3)
PROT SERPL-MCNC: 7.2 G/DL (ref 6.4–8.3)
PSA SERPL DL<=0.01 NG/ML-MCNC: 0.3 NG/ML (ref 0–3.5)
RBC # BLD AUTO: 5.69 10E6/UL (ref 4.4–5.9)
SODIUM SERPL-SCNC: 137 MMOL/L (ref 135–145)
TRIGL SERPL-MCNC: 85 MG/DL
VIT D+METAB SERPL-MCNC: 14 NG/ML (ref 20–50)
WBC # BLD AUTO: 9.3 10E3/UL (ref 4–11)

## 2025-01-18 PROCEDURE — G0103 PSA SCREENING: HCPCS

## 2025-01-18 PROCEDURE — 80061 LIPID PANEL: CPT

## 2025-01-18 PROCEDURE — 85041 AUTOMATED RBC COUNT: CPT

## 2025-01-18 PROCEDURE — 85004 AUTOMATED DIFF WBC COUNT: CPT

## 2025-01-18 PROCEDURE — 82728 ASSAY OF FERRITIN: CPT

## 2025-01-18 PROCEDURE — 36415 COLL VENOUS BLD VENIPUNCTURE: CPT

## 2025-01-18 PROCEDURE — 82306 VITAMIN D 25 HYDROXY: CPT

## 2025-01-18 PROCEDURE — 80053 COMPREHEN METABOLIC PANEL: CPT

## 2025-01-18 PROCEDURE — 83036 HEMOGLOBIN GLYCOSYLATED A1C: CPT

## 2025-01-20 ENCOUNTER — OFFICE VISIT (OUTPATIENT)
Dept: FAMILY MEDICINE | Facility: CLINIC | Age: 55
End: 2025-01-20
Attending: PHYSICIAN ASSISTANT
Payer: COMMERCIAL

## 2025-01-20 VITALS
OXYGEN SATURATION: 96 % | BODY MASS INDEX: 36.77 KG/M2 | HEIGHT: 76 IN | HEART RATE: 74 BPM | SYSTOLIC BLOOD PRESSURE: 130 MMHG | DIASTOLIC BLOOD PRESSURE: 78 MMHG | WEIGHT: 302 LBS | TEMPERATURE: 97.6 F

## 2025-01-20 DIAGNOSIS — H02.64 XANTHELASMA OF UPPER EYELIDS OF BOTH EYES: ICD-10-CM

## 2025-01-20 DIAGNOSIS — H02.61 XANTHELASMA OF UPPER EYELIDS OF BOTH EYES: ICD-10-CM

## 2025-01-20 DIAGNOSIS — Z00.00 ROUTINE GENERAL MEDICAL EXAMINATION AT A HEALTH CARE FACILITY: Primary | ICD-10-CM

## 2025-01-20 DIAGNOSIS — E11.42 DIABETIC POLYNEUROPATHY ASSOCIATED WITH TYPE 2 DIABETES MELLITUS (H): ICD-10-CM

## 2025-01-20 DIAGNOSIS — G47.19 EXCESSIVE DAYTIME SLEEPINESS: ICD-10-CM

## 2025-01-20 PROCEDURE — 99396 PREV VISIT EST AGE 40-64: CPT | Performed by: PHYSICIAN ASSISTANT

## 2025-01-20 SDOH — HEALTH STABILITY: PHYSICAL HEALTH: ON AVERAGE, HOW MANY DAYS PER WEEK DO YOU ENGAGE IN MODERATE TO STRENUOUS EXERCISE (LIKE A BRISK WALK)?: 0 DAYS

## 2025-01-20 SDOH — HEALTH STABILITY: PHYSICAL HEALTH: ON AVERAGE, HOW MANY MINUTES DO YOU ENGAGE IN EXERCISE AT THIS LEVEL?: 0 MIN

## 2025-01-20 ASSESSMENT — ANXIETY QUESTIONNAIRES
3. WORRYING TOO MUCH ABOUT DIFFERENT THINGS: NOT AT ALL
2. NOT BEING ABLE TO STOP OR CONTROL WORRYING: NOT AT ALL
IF YOU CHECKED OFF ANY PROBLEMS ON THIS QUESTIONNAIRE, HOW DIFFICULT HAVE THESE PROBLEMS MADE IT FOR YOU TO DO YOUR WORK, TAKE CARE OF THINGS AT HOME, OR GET ALONG WITH OTHER PEOPLE: SOMEWHAT DIFFICULT
GAD7 TOTAL SCORE: 3
7. FEELING AFRAID AS IF SOMETHING AWFUL MIGHT HAPPEN: NOT AT ALL
1. FEELING NERVOUS, ANXIOUS, OR ON EDGE: NOT AT ALL
5. BEING SO RESTLESS THAT IT IS HARD TO SIT STILL: NOT AT ALL
GAD7 TOTAL SCORE: 3
7. FEELING AFRAID AS IF SOMETHING AWFUL MIGHT HAPPEN: NOT AT ALL
6. BECOMING EASILY ANNOYED OR IRRITABLE: SEVERAL DAYS
4. TROUBLE RELAXING: MORE THAN HALF THE DAYS
GAD7 TOTAL SCORE: 3
8. IF YOU CHECKED OFF ANY PROBLEMS, HOW DIFFICULT HAVE THESE MADE IT FOR YOU TO DO YOUR WORK, TAKE CARE OF THINGS AT HOME, OR GET ALONG WITH OTHER PEOPLE?: SOMEWHAT DIFFICULT

## 2025-01-20 ASSESSMENT — PATIENT HEALTH QUESTIONNAIRE - PHQ9
SUM OF ALL RESPONSES TO PHQ QUESTIONS 1-9: 10
SUM OF ALL RESPONSES TO PHQ QUESTIONS 1-9: 10
10. IF YOU CHECKED OFF ANY PROBLEMS, HOW DIFFICULT HAVE THESE PROBLEMS MADE IT FOR YOU TO DO YOUR WORK, TAKE CARE OF THINGS AT HOME, OR GET ALONG WITH OTHER PEOPLE: SOMEWHAT DIFFICULT

## 2025-01-20 ASSESSMENT — SOCIAL DETERMINANTS OF HEALTH (SDOH): HOW OFTEN DO YOU GET TOGETHER WITH FRIENDS OR RELATIVES?: NEVER

## 2025-01-20 NOTE — PATIENT INSTRUCTIONS
Patient Education   Preventive Care Advice   This is general advice given by our system to help you stay healthy. However, your care team may have specific advice just for you. Please talk to your care team about your preventive care needs.  Nutrition  Eat 5 or more servings of fruits and vegetables each day.  Try wheat bread, brown rice and whole grain pasta (instead of white bread, rice, and pasta).  Get enough calcium and vitamin D. Check the label on foods and aim for 100% of the RDA (recommended daily allowance).  Lifestyle  Exercise at least 150 minutes each week  (30 minutes a day, 5 days a week).  Do muscle strengthening activities 2 days a week. These help control your weight and prevent disease.  No smoking.  Wear sunscreen to prevent skin cancer.  Have a dental exam and cleaning every 6 months.  Yearly exams  See your health care team every year to talk about:  Any changes in your health.  Any medicines your care team has prescribed.  Preventive care, family planning, and ways to prevent chronic diseases.  Shots (vaccines)   HPV shots (up to age 26), if you've never had them before.  Hepatitis B shots (up to age 59), if you've never had them before.  COVID-19 shot: Get this shot when it's due.  Flu shot: Get a flu shot every year.  Tetanus shot: Get a tetanus shot every 10 years.  Pneumococcal, hepatitis A, and RSV shots: Ask your care team if you need these based on your risk.  Shingles shot (for age 50 and up)  General health tests  Diabetes screening:  Starting at age 35, Get screened for diabetes at least every 3 years.  If you are younger than age 35, ask your care team if you should be screened for diabetes.  Cholesterol test: At age 39, start having a cholesterol test every 5 years, or more often if advised.  Bone density scan (DEXA): At age 50, ask your care team if you should have this scan for osteoporosis (brittle bones).  Hepatitis C: Get tested at least once in your life.  STIs (sexually  transmitted infections)  Before age 24: Ask your care team if you should be screened for STIs.  After age 24: Get screened for STIs if you're at risk. You are at risk for STIs (including HIV) if:  You are sexually active with more than one person.  You don't use condoms every time.  You or a partner was diagnosed with a sexually transmitted infection.  If you are at risk for HIV, ask about PrEP medicine to prevent HIV.  Get tested for HIV at least once in your life, whether you are at risk for HIV or not.  Cancer screening tests  Cervical cancer screening: If you have a cervix, begin getting regular cervical cancer screening tests starting at age 21.  Breast cancer scan (mammogram): If you've ever had breasts, begin having regular mammograms starting at age 40. This is a scan to check for breast cancer.  Colon cancer screening: It is important to start screening for colon cancer at age 45.  Have a colonoscopy test every 10 years (or more often if you're at risk) Or, ask your provider about stool tests like a FIT test every year or Cologuard test every 3 years.  To learn more about your testing options, visit:   .  For help making a decision, visit:   https://bit.ly/ur07826.  Prostate cancer screening test: If you have a prostate, ask your care team if a prostate cancer screening test (PSA) at age 55 is right for you.  Lung cancer screening: If you are a current or former smoker ages 50 to 80, ask your care team if ongoing lung cancer screenings are right for you.  For informational purposes only. Not to replace the advice of your health care provider. Copyright   2023 [facility]. All rights reserved. Clinically reviewed by the [facility]. [employer] 714375 - REV 01/24.  Learning About Stress  What is stress?     Stress is your body's response to a hard situation. Your body can have a physical, emotional, or mental response. Stress is a fact of life for most people, and it  affects everyone differently. What causes stress for you may not be stressful for someone else.  A lot of things can cause stress. You may feel stress when you go on a job interview, take a test, or run a race. This kind of short-term stress is normal and even useful. It can help you if you need to work hard or react quickly. For example, stress can help you finish an important job on time.  Long-term stress is caused by ongoing stressful situations or events. Examples of long-term stress include long-term health problems, ongoing problems at work, or conflicts in your family. Long-term stress can harm your health.  How does stress affect your health?  When you are stressed, your body responds as though you are in danger. It makes hormones that speed up your heart, make you breathe faster, and give you a burst of energy. This is called the fight-or-flight stress response. If the stress is over quickly, your body goes back to normal and no harm is done.  But if stress happens too often or lasts too long, it can have bad effects. Long-term stress can make you more likely to get sick, and it can make symptoms of some diseases worse. If you tense up when you are stressed, you may develop neck, shoulder, or low back pain. Stress is linked to high blood pressure and heart disease.  Stress also harms your emotional health. It can make you hunt, tense, or depressed. Your relationships may suffer, and you may not do well at work or school.  What can you do to manage stress?  You can try these things to help manage stress:   Do something active. Exercise or activity can help reduce stress. Walking is a great way to get started. Even everyday activities such as housecleaning or yard work can help.  Try yoga or annie chi. These techniques combine exercise and meditation. You may need some training at first to learn them.  Do something you enjoy. For example, listen to music or go to a movie. Practice your hobby or do volunteer  "work.  Meditate. This can help you relax, because you are not worrying about what happened before or what may happen in the future.  Do guided imagery. Imagine yourself in any setting that helps you feel calm. You can use online videos, books, or a teacher to guide you.  Do breathing exercises. For example:  From a standing position, bend forward from the waist with your knees slightly bent. Let your arms dangle close to the floor.  Breathe in slowly and deeply as you return to a standing position. Roll up slowly and lift your head last.  Hold your breath for just a few seconds in the standing position.  Breathe out slowly and bend forward from the waist.  Let your feelings out. Talk, laugh, cry, and express anger when you need to. Talking with supportive friends or family, a counselor, or a porfirio leader about your feelings is a healthy way to relieve stress. Avoid discussing your feelings with people who make you feel worse.  Write. It may help to write about things that are bothering you. This helps you find out how much stress you feel and what is causing it. When you know this, you can find better ways to cope.  What can you do to prevent stress?  You might try some of these things to help prevent stress:  Manage your time. This helps you find time to do the things you want and need to do.  Get enough sleep. Your body recovers from the stresses of the day while you are sleeping.  Get support. Your family, friends, and community can make a difference in how you experience stress.  Limit your news feed. Avoid or limit time on social media or news that may make you feel stressed.  Do something active. Exercise or activity can help reduce stress. Walking is a great way to get started.  Where can you learn more?  Go to https://www.[employer].net/patiented  Enter N032 in the search box to learn more about \"Learning About Stress.\"  Current as of: October 24, 2023  Content Version: 14.3    2024 [employer]. "   Care instructions adapted under license by your healthcare professional. If you have questions about a medical condition or this instruction, always ask your healthcare professional. [employer] disclaims any warranty or liability for your use of this information.    Learning About Depression Screening  What is depression screening?  Depression screening is a way to see if you have depression symptoms. It may be done by a doctor or counselor. It's often part of a routine checkup. That's because your mental health is just as important as your physical health.  Depression is a mental health condition that affects how you feel, think, and act. You may:  Have less energy.  Lose interest in your daily activities.  Feel sad and grouchy for a long time.  Depression is very common. It affects people of all ages.  Many things can lead to depression. Some people become depressed after they have a stroke or find out they have a major illness like cancer or heart disease. The death of a loved one or a breakup may lead to depression. It can run in families. Most experts believe that a combination of inherited genes and stressful life events can cause it.  What happens during screening?  You may be asked to fill out a form about your depression symptoms. You and the doctor will discuss your answers. The doctor may ask you more questions to learn more about how you think, act, and feel.  What happens after screening?  If you have symptoms of depression, your doctor will talk to you about your options.  Doctors usually treat depression with medicines or counseling. Often, combining the two works best. Many people don't get help because they think that they'll get over the depression on their own. But people with depression may not get better unless they get treatment.  The cause of depression is not well understood. There may be many factors involved. But if you have depression, it's not your fault.  A serious symptom  "of depression is thinking about death or suicide. If you or someone you care about talks about this or about feeling hopeless, get help right away.  It's important to know that depression can be treated. Medicine, counseling, and self-care may help.  Where can you learn more?  Go to https://www.[employer].net/patiented  Enter T185 in the search box to learn more about \"Learning About Depression Screening.\"  Current as of: July 31, 2024  Content Version: 14.3    2024 [employer].   Care instructions adapted under license by your healthcare professional. If you have questions about a medical condition or this instruction, always ask your healthcare professional. [employer] disclaims any warranty or liability for your use of this information.       "

## 2025-01-20 NOTE — PROGRESS NOTES
"Preventive Care Visit  [facility] RUDI Zhang PA-C, Family Medicine  Jan 20, 2025      Assessment & Plan       ICD-10-CM    1. Routine general medical examination at a health care facility  Z00.00       2. Diabetic polyneuropathy associated with type 2 diabetes mellitus (H)  E11.42       3. Excessive daytime sleepiness  G47.19 Adult Sleep Eval & Management Referral      4. Xanthelasma of upper eyelids of both eyes  H02.61     H02.64                 Patient has been advised of split billing requirements and indicates understanding: Yes        BMI  Estimated body mass index is 36.76 kg/m  as calculated from the following:    Height as of this encounter: 1.93 m (6' 4\").    Weight as of this encounter: 137 kg (302 lb).       Counseling  Appropriate preventive services were addressed with this patient via screening, questionnaire, or discussion as appropriate for fall prevention, nutrition, physical activity, Tobacco-use cessation, social engagement, weight loss and cognition.  Checklist reviewing preventive services available has been given to the patient.  The patient's PHQ-9 score is consistent with moderate depression. He was provided with information regarding depression.           Vivi Farah is a 54 year old, presenting for the following:  Physical        1/20/2025     4:08 PM   Additional Questions   Roomed by KATELYNN Vega          HPI    -He has some spots on his eye lids that popped up a few months pete     Health Care Directive  Patient does not have a Health Care Directive:       1/20/2025   General Health   How would you rate your overall physical health? (!) FAIR   Feel stress (tense, anxious, or unable to sleep) Very much   (!) STRESS CONCERN      1/20/2025   Nutrition   Three or more servings of calcium each day? Yes   Diet: Diabetic   How many servings of fruit and vegetables per day? (!) 0-1   How many sweetened beverages each day? 0-1         1/20/2025   Exercise   Days per " week of moderate/strenous exercise 0 days   Average minutes spent exercising at this level 0 min   (!) EXERCISE CONCERN      1/20/2025   Social Factors   Frequency of gathering with friends or relatives Never   Worry food won't last until get money to buy more No    No   Food not last or not have enough money for food? No    No   Do you have housing? (Housing is defined as stable permanent housing and does not include staying ouside in a car, in a tent, in an abandoned building, in an overnight shelter, or couch-surfing.) Yes    Yes   Are you worried about losing your housing? No    No   Lack of transportation? No    No   Unable to get utilities (heat,electricity)? No    No       Multiple values from one day are sorted in reverse-chronological order   (!) SOCIAL CONNECTIONS CONCERN      1/20/2025   Fall Risk   Fallen 2 or more times in the past year? No   Trouble with walking or balance? No          1/20/2025   Dental   Dentist two times every year? (!) NO          Today's PHQ-9 Score:       1/20/2025     9:19 AM   PHQ-9 SCORE   PHQ-9 Total Score MyChart 10 (Moderate depression)   PHQ-9 Total Score 10        Patient-reported         1/20/2025   Substance Use   Alcohol more than 3/day or more than 7/wk No   Do you use any other substances recreationally? No     Social History     Tobacco Use    Smoking status: Former    Smokeless tobacco: Current     Types: Chew   Vaping Use    Vaping status: Never Used   Substance Use Topics    Alcohol use: Not Currently    Drug use: Not Currently           1/20/2025   STI Screening   New sexual partner(s) since last STI/HIV test? No   Last PSA:   Prostate Specific Antigen Screen   Date Value Ref Range Status   01/18/2025 0.30 0.00 - 3.50 ng/mL Final   03/05/2021 0.4 0.0 - 3.5 ng/mL Final     ASCVD Risk   The 10-year ASCVD risk score (Hillary LIMA, et al., 2019) is: 10.8%    Values used to calculate the score:      Age: 54 years      Sex: Male      Is Non-   "American: No      Diabetic: Yes      Tobacco smoker: No      Systolic Blood Pressure: 130 mmHg      Is BP treated: No      HDL Cholesterol: 48 mg/dL      Total Cholesterol: 179 mg/dL      Reviewed and updated as needed this visit by Provider                    BP Readings from Last 3 Encounters:   01/20/25 130/78   01/10/25 (!) 139/95   06/14/24 (!) 135/90    Wt Readings from Last 3 Encounters:   01/20/25 137 kg (302 lb)   01/10/25 136.1 kg (300 lb)   06/14/24 131.5 kg (290 lb)                      Review of Systems  Constitutional, HEENT, cardiovascular, pulmonary, GI, , musculoskeletal, neuro, skin, endocrine and psych systems are negative, except as otherwise noted.     Objective    Exam  /78   Pulse 74   Temp 97.6  F (36.4  C) (Tympanic)   Wt 137 kg (302 lb)   SpO2 96%   BMI 37.25 kg/m     Estimated body mass index is 37.25 kg/m  as calculated from the following:    Height as of 1/10/25: 1.918 m (6' 3.5\").    Weight as of this encounter: 137 kg (302 lb).    Physical Exam  GENERAL: alert and no distress  EYES: Eyes grossly normal to inspection, PERRL and conjunctivae and sclerae normal  HENT: ear canals and TM's normal, nose and mouth without ulcers or lesions  NECK: no adenopathy, no asymmetry, masses, or scars  RESP: lungs clear to auscultation - no rales, rhonchi or wheezes  CV: regular rate and rhythm, normal S1 S2, no S3 or S4, no murmur, click or rub, no peripheral edema  ABDOMEN: soft, nontender, no hepatosplenomegaly, no masses and bowel sounds normal  MS: no gross musculoskeletal defects noted, no edema  SKIN: no suspicious lesions or rashes. Smooth raised flat papules, yellow in color underneath each eyebrow/upper eyelid  NEURO: Normal strength and tone, mentation intact and speech normal  PSYCH: mentation appears normal, affect normal/bright        Signed Electronically by: Salima Zhang PA-C    Answers submitted by the patient for this visit:  Patient Health Questionnaire (Submitted " on 1/20/2025)  If you checked off any problems, how difficult have these problems made it for you to do your work, take care of things at home, or get along with other people?: Somewhat difficult  PHQ9 TOTAL SCORE: 10  Patient Health Questionnaire (G7) (Submitted on 1/20/2025)  LUCERO 7 TOTAL SCORE: 3

## 2025-01-30 DIAGNOSIS — E29.1 HYPOGONADISM MALE: ICD-10-CM

## 2025-02-01 ENCOUNTER — MYC MEDICAL ADVICE (OUTPATIENT)
Dept: FAMILY MEDICINE | Facility: CLINIC | Age: 55
End: 2025-02-01
Payer: COMMERCIAL

## 2025-02-26 DIAGNOSIS — E11.9 TYPE 2 DIABETES MELLITUS WITHOUT COMPLICATION, WITHOUT LONG-TERM CURRENT USE OF INSULIN (H): ICD-10-CM

## 2025-02-27 RX ORDER — ALCOHOL ANTISEPTIC PADS
PADS, MEDICATED (EA) TOPICAL
Qty: 100 EACH | Refills: 3 | Status: SHIPPED | OUTPATIENT
Start: 2025-02-27

## 2025-03-07 DIAGNOSIS — E11.65 TYPE 2 DIABETES MELLITUS WITH HYPERGLYCEMIA, WITHOUT LONG-TERM CURRENT USE OF INSULIN (H): ICD-10-CM

## 2025-03-10 RX ORDER — DULAGLUTIDE 3 MG/.5ML
INJECTION, SOLUTION SUBCUTANEOUS
Qty: 6 ML | Refills: 3 | Status: SHIPPED | OUTPATIENT
Start: 2025-03-10

## 2025-04-03 ENCOUNTER — APPOINTMENT (OUTPATIENT)
Dept: RADIOLOGY | Facility: CLINIC | Age: 55
End: 2025-04-03
Attending: EMERGENCY MEDICINE
Payer: COMMERCIAL

## 2025-04-03 ENCOUNTER — TRANSFERRED RECORDS (OUTPATIENT)
Dept: HEALTH INFORMATION MANAGEMENT | Facility: CLINIC | Age: 55
End: 2025-04-03
Payer: COMMERCIAL

## 2025-04-03 ENCOUNTER — HOSPITAL ENCOUNTER (EMERGENCY)
Facility: CLINIC | Age: 55
Discharge: HOME | End: 2025-04-03
Attending: EMERGENCY MEDICINE
Payer: COMMERCIAL

## 2025-04-03 ENCOUNTER — APPOINTMENT (OUTPATIENT)
Dept: CT IMAGING | Facility: CLINIC | Age: 55
End: 2025-04-03
Attending: EMERGENCY MEDICINE
Payer: COMMERCIAL

## 2025-04-03 VITALS
SYSTOLIC BLOOD PRESSURE: 123 MMHG | BODY MASS INDEX: 35.07 KG/M2 | WEIGHT: 288 LBS | HEART RATE: 78 BPM | TEMPERATURE: 97.5 F | RESPIRATION RATE: 22 BRPM | DIASTOLIC BLOOD PRESSURE: 81 MMHG | HEIGHT: 76 IN | OXYGEN SATURATION: 96 %

## 2025-04-03 DIAGNOSIS — M20.42 HAMMERTOE OF LEFT FOOT: ICD-10-CM

## 2025-04-03 DIAGNOSIS — S92.252A CLOSED DISPLACED FRACTURE OF NAVICULAR BONE OF LEFT FOOT, INITIAL ENCOUNTER: ICD-10-CM

## 2025-04-03 PROCEDURE — 73700 CT LOWER EXTREMITY W/O DYE: CPT | Mod: LT

## 2025-04-03 PROCEDURE — 28450 TX TARSAL B1 FX W/O MNPJ EA: CPT | Mod: LT

## 2025-04-03 PROCEDURE — 73630 X-RAY EXAM OF FOOT: CPT | Mod: LT

## 2025-04-03 PROCEDURE — 73610 X-RAY EXAM OF ANKLE: CPT | Mod: LT

## 2025-04-03 PROCEDURE — 73660 X-RAY EXAM OF TOE(S): CPT | Mod: RT

## 2025-04-03 PROCEDURE — 99284 EMERGENCY DEPT VISIT MOD MDM: CPT | Mod: 25

## 2025-04-03 RX ORDER — OXYCODONE HYDROCHLORIDE 5 MG/1
5 TABLET ORAL EVERY 6 HOURS PRN
Qty: 12 TABLET | Refills: 0 | Status: SHIPPED | OUTPATIENT
Start: 2025-04-03 | End: 2025-04-06

## 2025-04-03 ASSESSMENT — ACTIVITIES OF DAILY LIVING (ADL)
ADLS_ACUITY_SCORE: 41
ADLS_ACUITY_SCORE: 41

## 2025-04-03 ASSESSMENT — COLUMBIA-SUICIDE SEVERITY RATING SCALE - C-SSRS
1. IN THE PAST MONTH, HAVE YOU WISHED YOU WERE DEAD OR WISHED YOU COULD GO TO SLEEP AND NOT WAKE UP?: NO
6. HAVE YOU EVER DONE ANYTHING, STARTED TO DO ANYTHING, OR PREPARED TO DO ANYTHING TO END YOUR LIFE?: NO
2. HAVE YOU ACTUALLY HAD ANY THOUGHTS OF KILLING YOURSELF IN THE PAST MONTH?: NO

## 2025-04-03 NOTE — DISCHARGE INSTRUCTIONS
IMPRESSION:  1.  Minimally displaced fracture of the dorsal aspect of the navicula extending into the talonavicular joint.  2.  Hammertoe deformities of the second and third toes with persistent lateral deviation and flexion of the first interphalangeal joint.    Recommend calling orthopedics today for follow-up  It is important to be nonweightbearing, keep plaster dry  Tylenol, ibuprofen, ice and elevation for pain  Roxicodone as needed for severe pain    You have been prescribed a narcotic pain medication that has risk for addiction with prolonged use, so please use sparingly.  Additionally, narcotics are medications that are sedating (will make you sleepy), so do not drive or operate machinery while taking this medication.  Avoid alcohol or other sedating medicines such as benzodiazepines while taking narcotics due to risk for increased sedation and difficulty breathing.      Narcotics will cause constipation.  If you need to take this medication please consider taking an over-the-counter stool softener and laxative, such as Senna Plus, to prevent constipation from developing.  Nausea is a side effect of narcotic use.  Possible additional side effects include vomiting, itching, and dizziness/lightheadedness.

## 2025-04-03 NOTE — ED TRIAGE NOTES
Patient presents to the ED complaining of bilateral foot injuries after missing a step a couple days ago when going downstairs.  Bruising, swelling, and pain to left foot and ankle.  Right great toe bruised and painful.  Ibuprofen 800 mg shortly prior to arrival.     Triage Assessment (Adult)       Row Name 04/03/25 0148          Triage Assessment    Airway WDL WDL        Respiratory WDL    Respiratory WDL WDL        Skin Circulation/Temperature WDL    Skin Circulation/Temperature WDL WDL        Cardiac WDL    Cardiac WDL WDL        Peripheral/Neurovascular WDL    Peripheral Neurovascular WDL WDL        Cognitive/Neuro/Behavioral WDL    Cognitive/Neuro/Behavioral WDL WDL

## 2025-04-03 NOTE — ED PROVIDER NOTES
EMERGENCY DEPARTMENT ENCOUNTER      NAME: Morris Christopher  AGE: 55 year old male  YOB: 1970  MRN: [id number]  EVALUATION DATE & TIME: No admission date for patient encounter.    PCP: Salima Zhang    ED PROVIDER: Wendie Byrne DO      Chief Complaint   Patient presents with    Foot Injury     bilateral         FINAL IMPRESSION:  1. Closed displaced fracture of navicular bone of left foot, initial encounter          ED COURSE & MEDICAL DECISION MAKING:    Pertinent Labs & Imaging studies reviewed. (See chart for details)  2:08 AM I met the patient and performed my initial interview and exam.  4:43 AM I performed a splint placement procedure. Refer to procedure section for further details.    55 year old male presents to the Emergency Department for evaluation of lower extremity pain.  He reports he missed a step a couple days ago when going on the stairs.  Pain to left foot and ankle and right great toe.  Treating with Tylenol, rest at home.  Today on his feet more at work and noting more pain and swelling.  Some paresthesias to the toes.  He does have bruising and swelling to the left foot and ankle.  Intact pulses and sensation.  He is ambulatory here.  Achilles intact.  No proximal fibular tenderness.  Right great toe tenderness.  Otherwise no midfoot or ankle tenderness.  X-rays obtained and shows a questionable navicular fracture of the left foot.  Patient is tender on the area, given this we will obtain CT imaging to rule in or out the fracture.  CT imaging does show a minimally displaced fracture of the dorsal aspect of the navicula extending into the talonavicular joint.  Discussed with patient.  Placed in splint, discussed nonweightbearing status.  Discussed other symptomatic care for home and encourage close follow-up with orthopedics.      At the conclusion of the encounter I discussed the results of all of the tests and the disposition. The questions were answered. The  patient or family acknowledged understanding and was agreeable with the care plan.     Medical Decision Making      Discharge. I prescribed additional prescription strength medication(s) as charted. See documentation for any additional details.    MIPS (CTPE, Dental pain, Mancia, Sinusitis, Asthma/COPD, Head Trauma): Not Applicable    SEPSIS: None        MEDICATIONS GIVEN IN THE EMERGENCY:  Medications - No data to display    NEW PRESCRIPTIONS STARTED AT TODAY'S ER VISIT  Discharge Medication List as of 4/3/2025  4:51 AM        START taking these medications    Details   oxyCODONE (ROXICODONE) 5 MG tablet Take 1 tablet (5 mg) by mouth every 6 hours as needed for pain., Disp-12 tablet, R-0, Local Print                =================================================================    HPI    Patient information was obtained from: The patient    Use of : N/A       Morris Christopher is a 55 year old male with a pertinent history of continuous opioid dependence who presents to this ED via walk-in for evaluation of foot injury.    The patient missed a few steps a couple of days ago and fell. He is complaining of pain to the left ankle and right big toe. He has been managing his pain with ibuprofen and took a dose prior to arrival. His symptoms were aggravated after walking today. No lacerations to his feet. No prior surgeries to feet or ankle. No other associated symptoms at this time.    REVIEW OF SYSTEMS   Per HPI    PAST MEDICAL HISTORY:  Past Medical History:   Diagnosis Date    Arthritis 01/01/2004    Bulging lumbar disc     Depression 01/01/2019    Diabetes mellitus (H) 01/01/2019    Hypogonadism male     Inguinal hernia, right     Morbid obesity (H)     Prediabetes 11/02/2018    Type 2 diabetes mellitus with diabetic polyneuropathy (H)        PAST SURGICAL HISTORY:  Past Surgical History:   Procedure Laterality Date    ARTHROSCOPY KNEE      bilateral     ARTHROSCOPY SHOULDER  2016    LUMBAR DISCECTOMY  "Left 11/29/2023    Procedure: left lumbar 4-lumbar 5 hemilaminectomy, medial facetectomy, foraminotomies and microdiscectomy;  Surgeon: Evelin Grover MD;  Location: [facility] [place] OR    TONSILLECTOMY             CURRENT MEDICATIONS:    oxyCODONE (ROXICODONE) 5 MG tablet  citalopram (CELEXA) 20 MG tablet  Dulaglutide (TRULICITY) 3 MG/0.5ML SOAJ  EPINEPHrine (ANY BX GENERIC EQUIV) 0.3 MG/0.3ML injection 2-pack  glipiZIDE (GLUCOTROL) 10 MG tablet  methocarbamol (ROBAXIN) 500 MG tablet  Needle, Disp, (BD HYPODERMIC NEEDLE) 25G X 1-1/2\" MISC  Syringe/Needle, Disp, (BD LUER-LOCK SYRINGE) 18G X 1-1/2\" 3 ML MISC  testosterone cypionate (DEPOTESTOSTERONE) 200 MG/ML injection  UltiCare Alcohol Swabs 70 % PADS         ALLERGIES:  Allergies   Allergen Reactions    Bee Venom Anaphylaxis    Lorazepam Other (See Comments)     Decreased respirations       FAMILY HISTORY:  Family History   Problem Relation Age of Onset    Lupus Mother     Fibromyalgia Mother     Kidney Disease Mother     Clotting Disorder Mother     Depression Mother     Early Death Mother     Hypertension Father     Coronary Artery Disease Father     Diabetes Father     Arthritis Father     No Known Problems Sister     No Known Problems Brother     Diabetes Maternal Grandmother     Alcoholism Maternal Grandmother     Prostate Cancer Maternal Grandfather     Alcoholism Maternal Grandfather     Cancer Maternal Grandfather     Colon Cancer Paternal Grandmother     Arthritis Paternal Grandmother     Cancer Paternal Grandmother     Hypertension Paternal Grandmother     Substance Abuse Maternal Aunt        SOCIAL HISTORY:   Social History     Socioeconomic History    Marital status:    Tobacco Use    Smoking status: Former    Smokeless tobacco: Current     Types: Chew   Vaping Use    Vaping status: Never Used   Substance and Sexual Activity    Alcohol use: Not Currently    Drug use: Not Currently     Social Drivers of Health     Financial Resource Strain: " "Low Risk  (1/20/2025)    Financial Resource Strain     Within the past 12 months, have you or your family members you live with been unable to get utilities (heat, electricity) when it was really needed?: No   Food Insecurity: Low Risk  (1/20/2025)    Food Insecurity     Within the past 12 months, did you worry that your food would run out before you got money to buy more?: No     Within the past 12 months, did the food you bought just not last and you didn t have money to get more?: No   Transportation Needs: Low Risk  (1/20/2025)    Transportation Needs     Within the past 12 months, has lack of transportation kept you from medical appointments, getting your medicines, non-medical meetings or appointments, work, or from getting things that you need?: No   Physical Activity: Inactive (1/20/2025)    Exercise Vital Sign     Days of Exercise per Week: 0 days     Minutes of Exercise per Session: 0 min   Stress: Stress Concern Present (1/20/2025)    [employer] [place] [employer] - Occupational Stress Questionnaire     Feeling of Stress : Very much   Social Connections: Unknown (1/20/2025)    Social Connection and Isolation Panel [NHANES]     Frequency of Social Gatherings with Friends and Family: Never   Housing Stability: Low Risk  (1/20/2025)    Housing Stability     Do you have housing? : Yes     Are you worried about losing your housing?: No       VITALS:  /81   Pulse 78   Temp 97.5  F (36.4  C) (Temporal)   Resp 22   Ht 1.918 m (6' 3.5\")   Wt 130.6 kg (288 lb)   SpO2 96%   BMI 35.52 kg/m      PHYSICAL EXAM    Physical Exam  Vitals and nursing note reviewed.   Constitutional:       General: He is not in acute distress.     Appearance: Normal appearance.   HENT:      Head: Normocephalic and atraumatic.   Eyes:      Pupils: Pupils are equal, round, and reactive to light.   Cardiovascular:      Rate and Rhythm: Normal rate and regular rhythm.      Pulses:           Dorsalis pedis pulses are 2+ " on the right side and 2+ on the left side.      Comments: Left foot edema  Pulmonary:      Effort: Pulmonary effort is normal.   Abdominal:      General: Abdomen is flat.   Musculoskeletal:         General: Normal range of motion.      Left ankle: Swelling and ecchymosis (to foot) present. No lacerations. Tenderness present over the lateral malleolus. No base of 5th metatarsal or proximal fibula tenderness. Normal pulse.      Left Achilles Tendon: Normal.      Right foot: Normal capillary refill. Tenderness (great toe) present. No swelling or laceration. Normal pulse.      Left foot: Normal capillary refill. Swelling, tenderness and bony tenderness present. No deformity or laceration. Normal pulse.   Skin:     General: Skin is warm and dry.   Neurological:      General: No focal deficit present.      Mental Status: He is alert.      Gait: Gait normal.           LAB:  All pertinent labs reviewed and interpreted.  Labs Ordered and Resulted from Time of ED Arrival to Time of ED Departure - No data to display    RADIOLOGY:  Reviewed all pertinent imaging. Please see official radiology report.  CT Foot Left w/o Contrast   Final Result   IMPRESSION:   1.  Minimally displaced fracture of the dorsal aspect of the navicula extending into the talonavicular joint.   2.  Hammertoe deformities of the second and third toes with persistent lateral deviation and flexion of the first interphalangeal joint.               Foot XR, G/E 3 views, left   Final Result   IMPRESSION: Normal joint spaces and alignment. Mild cortical step-off is seen over the dorsal surface of the navicular bone, seen on the lateral view only, equivocal for artifact versus minimally displaced fracture. Recommend correlation with focal point    tenderness. No other acute osseous abnormalities identified. Ankle mortise is congruent. A moderate size plantar calcaneal spur is present.      XR Ankle Left G/E 3 Views   Final Result   IMPRESSION: Normal joint spaces  and alignment. Mild cortical step-off is seen over the dorsal surface of the navicular bone, seen on the lateral view only, equivocal for artifact versus minimally displaced fracture. Recommend correlation with focal point    tenderness. No other acute osseous abnormalities identified. Ankle mortise is congruent. A moderate size plantar calcaneal spur is present.      XR Toe Right G/E 2 Views   Final Result   IMPRESSION: Mild cortical irregularity along the medial base of the first distal phalanx is likely secondary to mild exostosis. No acute fracture or dislocation identified. Visualized joint spaces are intact.            PROCEDURES:     PROCEDURE: Splint Placement   INDICATIONS: left foot navicular bone fracture   PROCEDURE PROVIDER: Dr Chanell Byrne   NOTE:  A Posterior slab (short leg) splint made of Plaster was applied to the Left lower extremity by the above provider. As noted in the physical exam, distal CMS was intact prior to placement. The splint was checked and the fit was adjusted to ensure proper positioning after placement. Sensation and circulation, as well as motor function, are unchanged after splint placement and the patient is more comfortable with the splint in place.       I, Jace Cotto, am serving as a scribe to document services personally performed by Dr. Wendie Byrne based on my observation and the provider's statements to me. Wendie STEVENS, DO attest that Jace Cotto is acting in a scribe capacity, has observed my performance of the services and has documented them in accordance with my direction.    Wendie Byrne DO  Emergency Medicine  [facility] EMERGENCY ROOM  [address] [facility] [id number] [phone]  Dept: [phone]       Wendie Byrne DO  04/03/25 1946

## 2025-04-03 NOTE — ED NOTES
Writer introduced self. White board updated. VSS on RA. Alert and oriented x4. Just came back from getting Left foot xray. C/O of pain 6/10 to left toes, refused pain meds, ice pack applied. Plan of care discussed, all questions answer. Call light within reach.

## 2025-04-28 ENCOUNTER — TRANSFERRED RECORDS (OUTPATIENT)
Dept: HEALTH INFORMATION MANAGEMENT | Facility: CLINIC | Age: 55
End: 2025-04-28
Payer: COMMERCIAL

## 2025-04-28 DIAGNOSIS — E29.1 HYPOGONADISM MALE: ICD-10-CM

## 2025-04-28 RX ORDER — TESTOSTERONE CYPIONATE 200 MG/ML
180 INJECTION, SOLUTION INTRAMUSCULAR
Qty: 2 ML | Refills: 5 | OUTPATIENT
Start: 2025-04-28

## 2025-04-28 NOTE — CONFIDENTIAL NOTE
Patient is well overdue for a testosterone level. Need that to continue medication so we can be sure we are dosing appropriately    Level should be done midway between injections - so 1 week after last injection (and 1 week before next is due)    Orders placed. Please call and have patient schedule this    Salima

## 2025-04-29 RX ORDER — TESTOSTERONE CYPIONATE 200 MG/ML
180 INJECTION, SOLUTION INTRAMUSCULAR
Qty: 2 ML | Refills: 0 | Status: SHIPPED | OUTPATIENT
Start: 2025-04-29

## 2025-05-08 ENCOUNTER — LAB REQUISITION (OUTPATIENT)
Dept: LAB | Facility: CLINIC | Age: 55
End: 2025-05-08
Payer: COMMERCIAL

## 2025-05-08 ENCOUNTER — TRANSFERRED RECORDS (OUTPATIENT)
Dept: HEALTH INFORMATION MANAGEMENT | Facility: CLINIC | Age: 55
End: 2025-05-08
Payer: COMMERCIAL

## 2025-05-08 DIAGNOSIS — M79.89 OTHER SPECIFIED SOFT TISSUE DISORDERS: ICD-10-CM

## 2025-05-10 ENCOUNTER — APPOINTMENT (OUTPATIENT)
Dept: RADIOLOGY | Facility: CLINIC | Age: 55
End: 2025-05-10
Attending: EMERGENCY MEDICINE
Payer: COMMERCIAL

## 2025-05-10 ENCOUNTER — HOSPITAL ENCOUNTER (EMERGENCY)
Facility: CLINIC | Age: 55
Discharge: HOME | End: 2025-05-10
Attending: EMERGENCY MEDICINE | Admitting: EMERGENCY MEDICINE
Payer: OTHER MISCELLANEOUS

## 2025-05-10 VITALS
DIASTOLIC BLOOD PRESSURE: 92 MMHG | RESPIRATION RATE: 20 BRPM | OXYGEN SATURATION: 97 % | HEART RATE: 88 BPM | TEMPERATURE: 98 F | SYSTOLIC BLOOD PRESSURE: 137 MMHG

## 2025-05-10 DIAGNOSIS — S93.602A FOOT SPRAIN, LEFT, INITIAL ENCOUNTER: ICD-10-CM

## 2025-05-10 PROCEDURE — 73630 X-RAY EXAM OF FOOT: CPT | Mod: LT

## 2025-05-10 PROCEDURE — 99283 EMERGENCY DEPT VISIT LOW MDM: CPT

## 2025-05-10 ASSESSMENT — ACTIVITIES OF DAILY LIVING (ADL): ADLS_ACUITY_SCORE: 41

## 2025-05-10 NOTE — ED PROVIDER NOTES
EMERGENCY DEPARTMENT ENCOUNTER      NAME: Morris Christopher  AGE: 55 year old male  YOB: 1970  MRN: [id number]  EVALUATION DATE & TIME: 5/10/2025 12:41 AM    PCP: Salima Zhang    ED PROVIDER: Renny Black MD    Chief Complaint   Patient presents with    Foot Pain     FINAL IMPRESSION:  1. Foot sprain, left, initial encounter      ED COURSE & MEDICAL DECISION MAKING:    Pertinent Labs & Imaging studies reviewed. (See chart for details)  55 year old male presents to the Emergency Department for evaluation of foot pain.  Patient was involved in a code here in the emergency department when he injured his foot.  Complains of pain to the lateral aspect of the left foot.  On examination there is tenderness to palpation and associated soft tissue swelling.  Remainder of examination unremarkable.  X-ray without acute fracture.  Overall clinical history examination workup most consistent with a foot sprain.  Patient has a walking boot from previous fracture to that same foot.  Recommended when he gets home he replies walking boot and utilizes until his pain is improving.  Recommend follow-up x-ray if symptoms are not improving.  Patient comfortable this plan of care.     Medical Decision Making  Discharge. No recommendations on prescription strength medication(s). See documentation for any additional details.    MIPS (CTPE, Dental pain, Mancia, Sinusitis, Asthma/COPD, Head Trauma): Not Applicable    SEPSIS: None    At the conclusion of the encounter I discussed the results of all of the tests and the disposition. The questions were answered. The patient or family acknowledged understanding and was agreeable with the care plan.     MEDICATIONS GIVEN IN THE EMERGENCY:  Medications - No data to display    NEW PRESCRIPTIONS STARTED AT TODAY'S ER VISIT  Discharge Medication List as of 5/10/2025  1:17 AM        Discharge Medication List as of 5/10/2025  1:17 AM           =================================================================    HPI    Patient information was obtained from: Patient      Morris Christopher is a 55 year old male with a pertinent history of previous fracture to his left foot presents the emergency department with increasing left foot pain.  Patient was involved in a medical code in the emergency department and during that process at some point he is not entirely sure when he injured his left foot.  Persistent pain in the development of swelling.  No other injury in the incident.  Denies numbness tingling or weakness.    PHYSICAL EXAM    BP (!) 137/92   Pulse 88   Temp 98  F (36.7  C) (Temporal)   Resp 20   SpO2 97%   PHYSICAL EXAM    VITAL SIGNS: BP (!) 137/92   Pulse 88   Temp 98  F (36.7  C) (Temporal)   Resp 20   SpO2 97%   Constitutional:  Well developed, well nourished, NAD  EYES: Conjunctivae clear, no discharge  HENT: Atraumatic, normocephalic, bilateral external ears normal.  Oropharynx moist. Nose normal.   Neck: Normal ROM , Supple   Respiratory:  No respiratory distress, normal nonlabored respirations.   Cardiovascular:  Distal perfusion appears intact  Musculoskeletal: Swelling noted to the left foot with tenderness to palpation on the lateral aspect of the foot.  Distal perfusion intact.  Neurovascularly intact.  Integument:  Warm, Dry, No erythema, No rash.   Neurologic:  Alert and oriented. No focal deficits noted.  Ambulatory  Psychiatric:  Affect normal, Judgment normal, Mood normal.       LAB:  All pertinent labs reviewed and interpreted.  Results for orders placed or performed during the hospital encounter of 05/10/25   XR Foot Left G/E 3 Views    Impression    IMPRESSION: Mild degenerative changes most marked the first MTP joint. Minimal calcaneal spurring at the plantar surface. No fracture or dislocation identified.       RADIOLOGY:  Reviewed all pertinent imaging. Please see official radiology report.  XR Foot Left G/E 3  Views   Final Result   IMPRESSION: Mild degenerative changes most marked the first MTP joint. Minimal calcaneal spurring at the plantar surface. No fracture or dislocation identified.        Renny Black MD  [facility] EMERGENCY ROOM  [facility] [facility] [address]  [phone]       Renny Black MD  05/10/25 0222

## 2025-05-10 NOTE — ED TRIAGE NOTES
Injury to Lt foot around 1930 when someone stepped on it, was previously fx and would like to make sure it hasn't been refractured. CMS intact to BLE, Lt foot more swollen than Rt.     Triage Assessment (Adult)       Row Name 05/10/25 0036          Triage Assessment    Airway WDL WDL        Respiratory WDL    Respiratory WDL WDL        Skin Circulation/Temperature WDL    Skin Circulation/Temperature WDL WDL        Cardiac WDL    Cardiac WDL WDL        Peripheral/Neurovascular WDL    Peripheral Neurovascular WDL WDL        Cognitive/Neuro/Behavioral WDL    Cognitive/Neuro/Behavioral WDL WDL

## 2025-05-10 NOTE — LETTER
May 10, 2025      To Whom It May Concern:      Morris Christopher was seen in our Emergency Department today, 05/10/25.  I expect his condition to improve over the next 2 days.  He may return to work/school when improved.    Sincerely,        Natacha Hargrove RN  Electronically signed

## 2025-05-10 NOTE — Clinical Note
Morris Christopher was seen and treated in our emergency department on 5/10/2025.  He may return to work on 05/11/2025.       If you have any questions or concerns, please don't hesitate to call.      Renny Black MD

## 2025-05-10 NOTE — DISCHARGE INSTRUCTIONS
Recommend rest ice and elevation.  Also recommend reapplying your walking boot until your pain is improving.  Follow-up for repeat imaging in 5 to 7 days if your symptoms persist or not improving.  Return to emergency department if symptoms escalate.

## 2025-05-12 ENCOUNTER — MYC MEDICAL ADVICE (OUTPATIENT)
Dept: FAMILY MEDICINE | Facility: CLINIC | Age: 55
End: 2025-05-12

## 2025-05-12 DIAGNOSIS — E11.42 DIABETIC POLYNEUROPATHY ASSOCIATED WITH TYPE 2 DIABETES MELLITUS (H): Primary | ICD-10-CM

## 2025-05-12 LAB
B BURGDOR IGG+IGM SER QL: 0.04
BASOPHILS # BLD AUTO: 0 10E3/UL (ref 0–0.2)
BASOPHILS NFR BLD AUTO: 1 %
CRP SERPL-MCNC: <3 MG/L
EOSINOPHIL # BLD AUTO: 0.2 10E3/UL (ref 0–0.7)
EOSINOPHIL NFR BLD AUTO: 3 %
ERYTHROCYTE [SEDIMENTATION RATE] IN BLOOD BY WESTERGREN METHOD: 3 MM/HR (ref 0–20)
IMM GRANULOCYTES # BLD: 0 10E3/UL
IMM GRANULOCYTES NFR BLD: 1 %
LYMPHOCYTES # BLD AUTO: 2.1 10E3/UL (ref 0.8–5.3)
LYMPHOCYTES NFR BLD AUTO: 30 %
MONOCYTES # BLD AUTO: 0.7 10E3/UL (ref 0–1.3)
MONOCYTES NFR BLD AUTO: 10 %
NEUTROPHILS # BLD AUTO: 4 10E3/UL (ref 1.6–8.3)
NEUTROPHILS NFR BLD AUTO: 56 %
NRBC # BLD AUTO: 0 10E3/UL
NRBC BLD AUTO-RTO: 0 /100
RHEUMATOID FACT SERPL-ACNC: <10 IU/ML
T3FREE SERPL-MCNC: 2.8 PG/ML (ref 2–4.4)
T4 FREE SERPL-MCNC: 0.98 NG/DL (ref 0.9–1.7)
T4 SERPL-MCNC: 7.1 UG/DL (ref 4.5–11.7)
TSH SERPL DL<=0.005 MIU/L-ACNC: 1.47 UIU/ML (ref 0.3–4.2)
URATE SERPL-MCNC: 5.9 MG/DL (ref 3.4–7)
WBC # BLD AUTO: 7 10E3/UL (ref 4–11)

## 2025-05-12 PROCEDURE — 86038 ANTINUCLEAR ANTIBODIES: CPT | Mod: ORL | Performed by: ORTHOPAEDIC SURGERY

## 2025-05-12 PROCEDURE — 85048 AUTOMATED LEUKOCYTE COUNT: CPT | Mod: ORL | Performed by: ORTHOPAEDIC SURGERY

## 2025-05-12 PROCEDURE — 84436 ASSAY OF TOTAL THYROXINE: CPT | Mod: ORL | Performed by: ORTHOPAEDIC SURGERY

## 2025-05-12 PROCEDURE — 85652 RBC SED RATE AUTOMATED: CPT | Mod: ORL | Performed by: ORTHOPAEDIC SURGERY

## 2025-05-12 PROCEDURE — 36415 COLL VENOUS BLD VENIPUNCTURE: CPT | Mod: ORL | Performed by: ORTHOPAEDIC SURGERY

## 2025-05-12 PROCEDURE — 86618 LYME DISEASE ANTIBODY: CPT | Mod: ORL | Performed by: ORTHOPAEDIC SURGERY

## 2025-05-12 PROCEDURE — 86140 C-REACTIVE PROTEIN: CPT | Mod: ORL | Performed by: ORTHOPAEDIC SURGERY

## 2025-05-12 PROCEDURE — 84443 ASSAY THYROID STIM HORMONE: CPT | Mod: ORL | Performed by: ORTHOPAEDIC SURGERY

## 2025-05-12 PROCEDURE — 84550 ASSAY OF BLOOD/URIC ACID: CPT | Mod: ORL | Performed by: ORTHOPAEDIC SURGERY

## 2025-05-12 PROCEDURE — 84481 FREE ASSAY (FT-3): CPT | Mod: ORL | Performed by: ORTHOPAEDIC SURGERY

## 2025-05-12 PROCEDURE — 84439 ASSAY OF FREE THYROXINE: CPT | Mod: ORL | Performed by: ORTHOPAEDIC SURGERY

## 2025-05-12 PROCEDURE — 86431 RHEUMATOID FACTOR QUANT: CPT | Mod: ORL | Performed by: ORTHOPAEDIC SURGERY

## 2025-05-12 RX ORDER — GABAPENTIN 300 MG/1
CAPSULE ORAL
Qty: 360 CAPSULE | Refills: 1 | Status: SHIPPED | OUTPATIENT
Start: 2025-05-12

## 2025-05-13 LAB — ANA SER QL IF: NEGATIVE

## 2025-05-16 ENCOUNTER — HOSPITAL ENCOUNTER (OUTPATIENT)
Dept: ULTRASOUND IMAGING | Facility: HOSPITAL | Age: 55
Discharge: HOME | End: 2025-05-16
Attending: ORTHOPAEDIC SURGERY | Admitting: ORTHOPAEDIC SURGERY
Payer: COMMERCIAL

## 2025-05-16 DIAGNOSIS — M79.89 FOOT SWELLING: ICD-10-CM

## 2025-05-16 PROCEDURE — 93971 EXTREMITY STUDY: CPT | Mod: LT

## 2025-06-10 ENCOUNTER — TRANSFERRED RECORDS (OUTPATIENT)
Dept: HEALTH INFORMATION MANAGEMENT | Facility: CLINIC | Age: 55
End: 2025-06-10
Payer: COMMERCIAL

## 2025-06-14 ENCOUNTER — MYC MEDICAL ADVICE (OUTPATIENT)
Dept: FAMILY MEDICINE | Facility: CLINIC | Age: 55
End: 2025-06-14
Payer: COMMERCIAL

## 2025-06-14 DIAGNOSIS — E29.1 HYPOGONADISM MALE: Primary | ICD-10-CM

## 2025-06-14 DIAGNOSIS — E11.9 TYPE 2 DIABETES MELLITUS WITHOUT COMPLICATION, WITHOUT LONG-TERM CURRENT USE OF INSULIN (H): ICD-10-CM

## 2025-06-18 ENCOUNTER — LAB (OUTPATIENT)
Dept: LAB | Facility: HOSPITAL | Age: 55
End: 2025-06-18
Payer: COMMERCIAL

## 2025-06-18 DIAGNOSIS — E11.9 TYPE 2 DIABETES MELLITUS WITHOUT COMPLICATION, WITHOUT LONG-TERM CURRENT USE OF INSULIN (H): ICD-10-CM

## 2025-06-18 DIAGNOSIS — E29.1 HYPOGONADISM MALE: ICD-10-CM

## 2025-06-18 LAB
EST. AVERAGE GLUCOSE BLD GHB EST-MCNC: 163 MG/DL
HBA1C MFR BLD: 7.3 %

## 2025-06-18 PROCEDURE — 83036 HEMOGLOBIN GLYCOSYLATED A1C: CPT

## 2025-06-18 PROCEDURE — 36415 COLL VENOUS BLD VENIPUNCTURE: CPT

## 2025-06-19 ENCOUNTER — RESULTS FOLLOW-UP (OUTPATIENT)
Dept: FAMILY MEDICINE | Facility: CLINIC | Age: 55
End: 2025-06-19

## 2025-06-21 LAB — TESTOST SERPL-MCNC: 920 NG/DL (ref 240–950)

## 2025-07-02 DIAGNOSIS — E29.1 HYPOGONADISM MALE: ICD-10-CM

## 2025-07-02 RX ORDER — TESTOSTERONE CYPIONATE 200 MG/ML
180 INJECTION, SOLUTION INTRAMUSCULAR
Qty: 2 ML | Refills: 0 | Status: SHIPPED | OUTPATIENT
Start: 2025-07-02

## 2025-07-07 DIAGNOSIS — M51.369 BULGING LUMBAR DISC: ICD-10-CM

## 2025-07-07 DIAGNOSIS — M62.830 BACK MUSCLE SPASM: ICD-10-CM

## 2025-07-07 RX ORDER — METHOCARBAMOL 500 MG/1
500 TABLET, FILM COATED ORAL 4 TIMES DAILY PRN
Qty: 60 TABLET | Refills: 3 | Status: SHIPPED | OUTPATIENT
Start: 2025-07-07

## 2025-08-11 DIAGNOSIS — E29.1 HYPOGONADISM MALE: ICD-10-CM

## 2025-08-11 RX ORDER — TESTOSTERONE CYPIONATE 200 MG/ML
180 INJECTION, SOLUTION INTRAMUSCULAR
Qty: 2 ML | Refills: 0 | Status: SHIPPED | OUTPATIENT
Start: 2025-08-11

## (undated) DEVICE — NEEDLE SPINAL DISP 22GA X 3.5" QUINCKE 333320

## (undated) DEVICE — SUTURE VICRYL+ 2-0 18 CT1/CR VLT VCP839D

## (undated) DEVICE — SU MONOCRYL+ 4-0 18IN PS2 UND MCP496G

## (undated) DEVICE — SUCTION MANIFOLD NEPTUNE 2 SYS 1 PORT 702-025-000

## (undated) DEVICE — GLOVE UNDER INDICATOR PI SZ 7.0 LF 41670

## (undated) DEVICE — WRAP THRP UNV LMBR GEL SFT SUB-2

## (undated) DEVICE — SU VICRYL+ 0 8-18 CT1/CR UND VCP840D

## (undated) DEVICE — PREP DYNA-HEX 4% CHG SCRUB 4OZ BOTTLE MDS098710

## (undated) DEVICE — PITCHER STERILE 1000ML  SSK9004A

## (undated) DEVICE — Device

## (undated) DEVICE — ESU PENCIL SMOKE EVAC W/ROCKER SWITCH 0703-047-000

## (undated) DEVICE — MARKER SURG SKIN 2 TIP STRL SPP99DT2AA

## (undated) DEVICE — SOL ISOPROPYL RUBBING ALCOHOL USP 70% 4OZ HDX-20 I0020

## (undated) DEVICE — CUSTOM PACK LUMBAR FUSION SNE5BLFHEA

## (undated) DEVICE — ESU ELEC BLADE 2.75" COATED/INSULATED E1455

## (undated) DEVICE — DRSG PRIMAPORE 03 1/8X6" 66000318

## (undated) DEVICE — CUSHION INSERT LG PRONE VIEW JACKSON TABLE

## (undated) DEVICE — TOOL DISSECT MIDAS MR8 14CM MATCH HEAD 3MM MR8-14MH30

## (undated) DEVICE — SOL NACL 0.9% IRRIG 1000ML BOTTLE 2F7124

## (undated) DEVICE — RX SURGIFLO HEMOSTATIC MATRIX W/THROMBIN 8ML 2994

## (undated) DEVICE — GLOVE BIOGEL PI ULTRATOUCH G SZ 6.5 42165

## (undated) DEVICE — SOL WATER IRRIG 1000ML BOTTLE 2F7114

## (undated) DEVICE — DRAPE STERI TOWEL LG 1010

## (undated) DEVICE — DECANTER VIAL 2006S

## (undated) DEVICE — SPONGE KITNER DISSECTING 7102*

## (undated) DEVICE — POSITIONER ARM CRADLE LAMINECTOMY DISP

## (undated) DEVICE — GLOVE UNDER INDICATOR PI SZ 6.5 LF 41665

## (undated) DEVICE — BLADE KNIFE SURG 11 371111

## (undated) DEVICE — PLATE GROUNDING ADULT W/CORD 9165L

## (undated) DEVICE — GOWN IMPERVIOUS BREATHABLE SMART LG 89015

## (undated) DEVICE — TRAY PREP DRY SKIN SCRUB 067

## (undated) RX ORDER — GINSENG 100 MG
CAPSULE ORAL
Status: DISPENSED
Start: 2023-11-29

## (undated) RX ORDER — DEXAMETHASONE SODIUM PHOSPHATE 10 MG/ML
INJECTION, SOLUTION INTRAMUSCULAR; INTRAVENOUS
Status: DISPENSED
Start: 2023-11-29

## (undated) RX ORDER — PROPOFOL 10 MG/ML
INJECTION, EMULSION INTRAVENOUS
Status: DISPENSED
Start: 2023-11-29

## (undated) RX ORDER — FENTANYL CITRATE 50 UG/ML
INJECTION, SOLUTION INTRAMUSCULAR; INTRAVENOUS
Status: DISPENSED
Start: 2023-11-29

## (undated) RX ORDER — ONDANSETRON 2 MG/ML
INJECTION INTRAMUSCULAR; INTRAVENOUS
Status: DISPENSED
Start: 2023-11-29

## (undated) RX ORDER — LIDOCAINE HYDROCHLORIDE AND EPINEPHRINE 10; 10 MG/ML; UG/ML
INJECTION, SOLUTION INFILTRATION; PERINEURAL
Status: DISPENSED
Start: 2023-11-29

## (undated) RX ORDER — LIDOCAINE HYDROCHLORIDE 10 MG/ML
INJECTION, SOLUTION EPIDURAL; INFILTRATION; INTRACAUDAL; PERINEURAL
Status: DISPENSED
Start: 2023-11-29